# Patient Record
Sex: MALE | Race: WHITE | NOT HISPANIC OR LATINO | Employment: OTHER | URBAN - METROPOLITAN AREA
[De-identification: names, ages, dates, MRNs, and addresses within clinical notes are randomized per-mention and may not be internally consistent; named-entity substitution may affect disease eponyms.]

---

## 2017-05-05 ENCOUNTER — HISTORICAL (OUTPATIENT)
Dept: ADMINISTRATIVE | Facility: HOSPITAL | Age: 62
End: 2017-05-05

## 2017-05-05 LAB
ABS NEUT (OLG): 1.35 X10(3)/MCL (ref 2.1–9.2)
ALBUMIN SERPL-MCNC: 3.7 GM/DL (ref 3.4–5)
ALBUMIN/GLOB SERPL: 1.4 {RATIO}
ALP SERPL-CCNC: 53 UNIT/L (ref 50–136)
ALT SERPL-CCNC: 31 UNIT/L (ref 12–78)
ANISOCYTOSIS BLD QL SMEAR: 1
APPEARANCE, UA: CLEAR
AST SERPL-CCNC: 14 UNIT/L (ref 15–37)
BACTERIA SPEC CULT: NORMAL /HPF
BILIRUB SERPL-MCNC: 0.9 MG/DL (ref 0.2–1)
BILIRUB UR QL STRIP: NEGATIVE
BILIRUBIN DIRECT+TOT PNL SERPL-MCNC: 0.2 MG/DL (ref 0–0.2)
BILIRUBIN DIRECT+TOT PNL SERPL-MCNC: 0.7 MG/DL (ref 0–0.8)
BUN SERPL-MCNC: 22 MG/DL (ref 7–18)
CALCIUM SERPL-MCNC: 8.4 MG/DL (ref 8.5–10.1)
CHLORIDE SERPL-SCNC: 108 MMOL/L (ref 98–107)
CHOLEST SERPL-MCNC: 132 MG/DL (ref 0–200)
CHOLEST/HDLC SERPL: 3.2 {RATIO} (ref 0–5)
CO2 SERPL-SCNC: 30 MMOL/L (ref 21–32)
COLOR UR: YELLOW
CREAT SERPL-MCNC: 0.81 MG/DL (ref 0.7–1.3)
EOSINOPHIL NFR BLD MANUAL: 2 % (ref 0–8)
ERYTHROCYTE [DISTWIDTH] IN BLOOD BY AUTOMATED COUNT: 13.2 % (ref 11.5–17)
GLOBULIN SER-MCNC: 2.6 GM/DL (ref 2.4–3.5)
GLUCOSE (UA): NEGATIVE
GLUCOSE SERPL-MCNC: 93 MG/DL (ref 74–106)
HCT VFR BLD AUTO: 39.4 % (ref 42–52)
HDLC SERPL-MCNC: 41 MG/DL (ref 35–60)
HGB BLD-MCNC: 13.7 GM/DL (ref 14–18)
HGB UR QL STRIP: NEGATIVE
KETONES UR QL STRIP: NEGATIVE
LDLC SERPL CALC-MCNC: 74 MG/DL (ref 0–129)
LEUKOCYTE ESTERASE UR QL STRIP: NEGATIVE
LYMPHOCYTES NFR BLD MANUAL: 12 %
LYMPHOCYTES NFR BLD MANUAL: 36 % (ref 13–40)
MCH RBC QN AUTO: 30.4 PG (ref 27–31)
MCHC RBC AUTO-ENTMCNC: 34.8 GM/DL (ref 33–36)
MCV RBC AUTO: 87.4 FL (ref 80–94)
MICROCYTES BLD QL SMEAR: 1
MONOCYTES NFR BLD MANUAL: 14 % (ref 2–11)
NEUTROPHILS NFR BLD MANUAL: 36 % (ref 47–80)
NITRITE UR QL STRIP: NEGATIVE
PH UR STRIP: 5.5 [PH] (ref 5–9)
PLATELET # BLD AUTO: 74 X10(3)/MCL (ref 130–400)
PLATELET # BLD EST: ABNORMAL 10*3/UL
PMV BLD AUTO: 10.3 FL (ref 7.4–10.4)
POTASSIUM SERPL-SCNC: 4.8 MMOL/L (ref 3.5–5.1)
PROT SERPL-MCNC: 6.3 GM/DL (ref 6.4–8.2)
PROT UR QL STRIP: NEGATIVE
PSA SERPL-MCNC: 0.78 NG/ML (ref 0–4)
RBC # BLD AUTO: 4.51 X10(6)/MCL (ref 4.7–6.1)
RBC #/AREA URNS HPF: NORMAL /[HPF]
SODIUM SERPL-SCNC: 143 MMOL/L (ref 136–145)
SP GR UR STRIP: 1.02 (ref 1–1.03)
SQUAMOUS EPITHELIAL, UA: NORMAL
TRIGL SERPL-MCNC: 86 MG/DL (ref 30–150)
TSH SERPL-ACNC: 2.65 MIU/ML (ref 0.36–3.74)
URATE SERPL-MCNC: 6.7 MG/DL (ref 2.6–7.2)
UROBILINOGEN UR STRIP-ACNC: 0.2
VLDLC SERPL CALC-MCNC: 17 MG/DL
WBC # SPEC AUTO: 3.2 X10(3)/MCL (ref 4.5–11.5)
WBC #/AREA URNS HPF: NORMAL /HPF

## 2017-06-12 ENCOUNTER — HISTORICAL (OUTPATIENT)
Dept: RADIOLOGY | Facility: HOSPITAL | Age: 62
End: 2017-06-12

## 2017-06-14 ENCOUNTER — HISTORICAL (OUTPATIENT)
Dept: HEMATOLOGY/ONCOLOGY | Facility: CLINIC | Age: 62
End: 2017-06-14

## 2017-06-14 LAB
ABS NEUT (OLG): 2.33 X10(3)/MCL (ref 2.1–9.2)
BASOPHILS # BLD AUTO: 0 X10(3)/MCL (ref 0–0.2)
BASOPHILS NFR BLD AUTO: 0.4 %
EOSINOPHIL # BLD AUTO: 0.3 X10(3)/MCL (ref 0–0.9)
EOSINOPHIL NFR BLD AUTO: 5.1 %
ERYTHROCYTE [DISTWIDTH] IN BLOOD BY AUTOMATED COUNT: 13.1 % (ref 11.5–17)
HCT VFR BLD AUTO: 43.3 % (ref 42–52)
HGB BLD-MCNC: 15.7 GM/DL (ref 14–18)
LYMPHOCYTES # BLD AUTO: 2 X10(3)/MCL (ref 0.6–4.6)
LYMPHOCYTES NFR BLD AUTO: 37.4 %
MCH RBC QN AUTO: 31.3 PG (ref 27–31)
MCHC RBC AUTO-ENTMCNC: 36.3 GM/DL (ref 33–36)
MCV RBC AUTO: 86.3 FL (ref 80–94)
MONOCYTES # BLD AUTO: 0.8 X10(3)/MCL (ref 0.1–1.3)
MONOCYTES NFR BLD AUTO: 14.5 %
NEUTROPHILS # BLD AUTO: 2.3 X10(3)/MCL (ref 2.1–9.2)
NEUTROPHILS NFR BLD AUTO: 42.6 %
PLATELET # BLD AUTO: 93 X10(3)/MCL (ref 130–400)
PMV BLD AUTO: 9.7 FL (ref 9.4–12.4)
RBC # BLD AUTO: 5.02 X10(6)/MCL (ref 4.7–6.1)
WBC # SPEC AUTO: 5.5 X10(3)/MCL (ref 4.5–11.5)

## 2017-12-18 ENCOUNTER — HISTORICAL (OUTPATIENT)
Dept: ADMINISTRATIVE | Facility: HOSPITAL | Age: 62
End: 2017-12-18

## 2017-12-18 LAB
ABS NEUT (OLG): 2.09 X10(3)/MCL (ref 2.1–9.2)
ALBUMIN SERPL-MCNC: 3.7 GM/DL (ref 3.4–5)
ALBUMIN/GLOB SERPL: 1.2 RATIO (ref 1.1–2)
ALP SERPL-CCNC: 59 UNIT/L (ref 50–136)
ALT SERPL-CCNC: 33 UNIT/L (ref 12–78)
AST SERPL-CCNC: 15 UNIT/L (ref 15–37)
BASOPHILS # BLD AUTO: 0 X10(3)/MCL (ref 0–0.2)
BASOPHILS NFR BLD AUTO: 0.2 %
BILIRUB SERPL-MCNC: 0.8 MG/DL (ref 0.2–1)
BILIRUBIN DIRECT+TOT PNL SERPL-MCNC: 0.2 MG/DL (ref 0–0.5)
BILIRUBIN DIRECT+TOT PNL SERPL-MCNC: 0.6 MG/DL (ref 0–0.8)
BUN SERPL-MCNC: 24 MG/DL (ref 7–18)
CALCIUM SERPL-MCNC: 8.7 MG/DL (ref 8.5–10.1)
CHLORIDE SERPL-SCNC: 106 MMOL/L (ref 98–107)
CO2 SERPL-SCNC: 29 MMOL/L (ref 21–32)
CREAT SERPL-MCNC: 0.76 MG/DL (ref 0.7–1.3)
EOSINOPHIL # BLD AUTO: 0.4 X10(3)/MCL (ref 0–0.9)
EOSINOPHIL NFR BLD AUTO: 6.7 %
ERYTHROCYTE [DISTWIDTH] IN BLOOD BY AUTOMATED COUNT: 13.7 % (ref 11.5–17)
GLOBULIN SER-MCNC: 3 GM/DL (ref 2.4–3.5)
GLUCOSE SERPL-MCNC: 94 MG/DL (ref 74–106)
HCT VFR BLD AUTO: 41.3 % (ref 42–52)
HGB BLD-MCNC: 14.5 GM/DL (ref 14–18)
LYMPHOCYTES # BLD AUTO: 2.1 X10(3)/MCL (ref 0.6–4.6)
LYMPHOCYTES NFR BLD AUTO: 38.5 %
MCH RBC QN AUTO: 30.5 PG (ref 27–31)
MCHC RBC AUTO-ENTMCNC: 35.1 GM/DL (ref 33–36)
MCV RBC AUTO: 86.9 FL (ref 80–94)
MONOCYTES # BLD AUTO: 0.8 X10(3)/MCL (ref 0.1–1.3)
MONOCYTES NFR BLD AUTO: 15.6 %
NEUTROPHILS # BLD AUTO: 2.1 X10(3)/MCL (ref 2.1–9.2)
NEUTROPHILS NFR BLD AUTO: 39 %
PLATELET # BLD AUTO: 83 X10(3)/MCL (ref 130–400)
PMV BLD AUTO: 9.7 FL (ref 9.4–12.4)
POTASSIUM SERPL-SCNC: 4.2 MMOL/L (ref 3.5–5.1)
PROT SERPL-MCNC: 6.7 GM/DL (ref 6.4–8.2)
RBC # BLD AUTO: 4.75 X10(6)/MCL (ref 4.7–6.1)
SODIUM SERPL-SCNC: 141 MMOL/L (ref 136–145)
WBC # SPEC AUTO: 5.4 X10(3)/MCL (ref 4.5–11.5)

## 2018-05-09 ENCOUNTER — HISTORICAL (OUTPATIENT)
Dept: ADMINISTRATIVE | Facility: HOSPITAL | Age: 63
End: 2018-05-09

## 2018-05-09 ENCOUNTER — HISTORICAL (OUTPATIENT)
Dept: HEMATOLOGY/ONCOLOGY | Facility: CLINIC | Age: 63
End: 2018-05-09

## 2018-05-09 LAB
ABS NEUT (OLG): 2.09 X10(3)/MCL (ref 2.1–9.2)
ABS NEUT (OLG): 3.27 X10(3)/MCL (ref 2.1–9.2)
ALBUMIN SERPL-MCNC: 4.1 GM/DL (ref 3.4–5)
ALBUMIN/GLOB SERPL: 1.5 {RATIO}
ALP SERPL-CCNC: 57 UNIT/L (ref 50–136)
ALT SERPL-CCNC: 34 UNIT/L (ref 12–78)
ANISOCYTOSIS BLD QL SMEAR: 2
AST SERPL-CCNC: 13 UNIT/L (ref 15–37)
BASOPHILS # BLD AUTO: 0 X10(3)/MCL (ref 0–0.2)
BASOPHILS # BLD AUTO: 0 X10(3)/MCL (ref 0–0.2)
BASOPHILS NFR BLD AUTO: 0.4 %
BASOPHILS NFR BLD AUTO: 1 %
BILIRUB SERPL-MCNC: 1.1 MG/DL (ref 0.2–1)
BILIRUBIN DIRECT+TOT PNL SERPL-MCNC: 0.2 MG/DL (ref 0–0.2)
BILIRUBIN DIRECT+TOT PNL SERPL-MCNC: 0.9 MG/DL (ref 0–0.8)
BUN SERPL-MCNC: 23 MG/DL (ref 7–18)
CALCIUM SERPL-MCNC: 8.8 MG/DL (ref 8.5–10.1)
CHLORIDE SERPL-SCNC: 108 MMOL/L (ref 98–107)
CHOLEST SERPL-MCNC: 140 MG/DL (ref 0–200)
CHOLEST/HDLC SERPL: 3.7 {RATIO} (ref 0–5)
CO2 SERPL-SCNC: 28 MMOL/L (ref 21–32)
CREAT SERPL-MCNC: 0.92 MG/DL (ref 0.7–1.3)
EOSINOPHIL # BLD AUTO: 0.2 X10(3)/MCL (ref 0–0.9)
EOSINOPHIL # BLD AUTO: 0.2 X10(3)/MCL (ref 0–0.9)
EOSINOPHIL NFR BLD AUTO: 2.9 %
EOSINOPHIL NFR BLD AUTO: 4 %
ERYTHROCYTE [DISTWIDTH] IN BLOOD BY AUTOMATED COUNT: 13.3 % (ref 11.5–17)
ERYTHROCYTE [DISTWIDTH] IN BLOOD BY AUTOMATED COUNT: 14.2 % (ref 11.5–17)
GLOBULIN SER-MCNC: 2.8 GM/DL (ref 2.4–3.5)
GLUCOSE SERPL-MCNC: 105 MG/DL (ref 74–106)
GROUP & RH: NORMAL
HCT VFR BLD AUTO: 42.3 % (ref 42–52)
HCT VFR BLD AUTO: 44.3 % (ref 42–52)
HDLC SERPL-MCNC: 38 MG/DL (ref 35–60)
HGB BLD-MCNC: 15.3 GM/DL (ref 14–18)
HGB BLD-MCNC: 15.5 GM/DL (ref 14–18)
LDLC SERPL CALC-MCNC: 75 MG/DL (ref 0–129)
LYMPHOCYTES # BLD AUTO: 2.8 X10(3)/MCL (ref 0.6–4.6)
LYMPHOCYTES # BLD AUTO: 3.2 X10(3)/MCL (ref 0.6–4.6)
LYMPHOCYTES NFR BLD AUTO: 42 %
LYMPHOCYTES NFR BLD AUTO: 48 %
MCH RBC QN AUTO: 30 PG (ref 27–31)
MCH RBC QN AUTO: 30.7 PG (ref 27–31)
MCHC RBC AUTO-ENTMCNC: 35 GM/DL (ref 33–36)
MCHC RBC AUTO-ENTMCNC: 36.2 GM/DL (ref 33–36)
MCV RBC AUTO: 84.8 FL (ref 80–94)
MCV RBC AUTO: 85.9 FL (ref 80–94)
MICROCYTES BLD QL SMEAR: 2
MONOCYTES # BLD AUTO: 0.6 X10(3)/MCL (ref 0.1–1.3)
MONOCYTES # BLD AUTO: 0.9 X10(3)/MCL (ref 0.1–1.3)
MONOCYTES NFR BLD AUTO: 11 %
MONOCYTES NFR BLD AUTO: 11.9 %
NEUTROPHILS # BLD AUTO: 2.09 X10(3)/MCL (ref 1.4–7.9)
NEUTROPHILS # BLD AUTO: 3.3 X10(3)/MCL (ref 2.1–9.2)
NEUTROPHILS NFR BLD AUTO: 36 %
NEUTROPHILS NFR BLD AUTO: 42.8 %
PLATELET # BLD AUTO: 6 X10(3)/MCL (ref 130–400)
PLATELET # BLD AUTO: 7 X10(3)/MCL (ref 130–400)
PLATELET # BLD EST: NORMAL 10*3/UL
PMV BLD AUTO: 10.4 FL (ref 9.4–12.4)
PMV BLD AUTO: 10.7 FL (ref 9.4–12.4)
POTASSIUM SERPL-SCNC: 4.4 MMOL/L (ref 3.5–5.1)
PROT SERPL-MCNC: 6.9 GM/DL (ref 6.4–8.2)
RBC # BLD AUTO: 4.99 X10(6)/MCL (ref 4.7–6.1)
RBC # BLD AUTO: 5.16 X10(6)/MCL (ref 4.7–6.1)
SODIUM SERPL-SCNC: 143 MMOL/L (ref 136–145)
TRIGL SERPL-MCNC: 135 MG/DL (ref 30–150)
VLDLC SERPL CALC-MCNC: 27 MG/DL
WBC # SPEC AUTO: 5.8 X10(3)/MCL (ref 4.5–11.5)
WBC # SPEC AUTO: 7.6 X10(3)/MCL (ref 4.5–11.5)

## 2018-05-11 ENCOUNTER — HISTORICAL (OUTPATIENT)
Dept: HEMATOLOGY/ONCOLOGY | Facility: CLINIC | Age: 63
End: 2018-05-11

## 2018-05-11 LAB
ABS NEUT (OLG): 7.15 X10(3)/MCL (ref 2.1–9.2)
BASOPHILS # BLD AUTO: 0 X10(3)/MCL (ref 0–0.2)
BASOPHILS NFR BLD AUTO: 0.2 %
EOSINOPHIL # BLD AUTO: 0 X10(3)/MCL (ref 0–0.9)
EOSINOPHIL NFR BLD AUTO: 0.1 %
ERYTHROCYTE [DISTWIDTH] IN BLOOD BY AUTOMATED COUNT: 14 % (ref 11.5–17)
HCT VFR BLD AUTO: 42.9 % (ref 42–52)
HGB BLD-MCNC: 15.4 GM/DL (ref 14–18)
LYMPHOCYTES # BLD AUTO: 4.3 X10(3)/MCL (ref 0.6–4.6)
LYMPHOCYTES NFR BLD AUTO: 36.7 %
MCH RBC QN AUTO: 30.1 PG (ref 27–31)
MCHC RBC AUTO-ENTMCNC: 35.9 GM/DL (ref 33–36)
MCV RBC AUTO: 83.8 FL (ref 80–94)
MONOCYTES # BLD AUTO: 0.2 X10(3)/MCL (ref 0.1–1.3)
MONOCYTES NFR BLD AUTO: 2 %
NEUTROPHILS # BLD AUTO: 7.2 X10(3)/MCL (ref 2.1–9.2)
NEUTROPHILS NFR BLD AUTO: 61 %
PLATELET # BLD AUTO: 14 X10(3)/MCL (ref 130–400)
PMV BLD AUTO: 10.4 FL (ref 9.4–12.4)
RBC # BLD AUTO: 5.12 X10(6)/MCL (ref 4.7–6.1)
WBC # SPEC AUTO: 11.7 X10(3)/MCL (ref 4.5–11.5)

## 2018-05-14 ENCOUNTER — HISTORICAL (OUTPATIENT)
Dept: HEMATOLOGY/ONCOLOGY | Facility: CLINIC | Age: 63
End: 2018-05-14

## 2018-05-14 LAB
ABS NEUT (OLG): 4.46 X10(3)/MCL (ref 2.1–9.2)
BASOPHILS # BLD AUTO: 0 X10(3)/MCL (ref 0–0.2)
BASOPHILS NFR BLD AUTO: 0.1 %
EOSINOPHIL # BLD AUTO: 0.1 X10(3)/MCL (ref 0–0.9)
EOSINOPHIL NFR BLD AUTO: 0.9 %
ERYTHROCYTE [DISTWIDTH] IN BLOOD BY AUTOMATED COUNT: 14.4 % (ref 11.5–17)
HCT VFR BLD AUTO: 41.5 % (ref 42–52)
HGB BLD-MCNC: 14.6 GM/DL (ref 14–18)
LYMPHOCYTES # BLD AUTO: 2.5 X10(3)/MCL (ref 0.6–4.6)
LYMPHOCYTES NFR BLD AUTO: 32.6 %
MCH RBC QN AUTO: 30.2 PG (ref 27–31)
MCHC RBC AUTO-ENTMCNC: 35.2 GM/DL (ref 33–36)
MCV RBC AUTO: 85.7 FL (ref 80–94)
MONOCYTES # BLD AUTO: 0.6 X10(3)/MCL (ref 0.1–1.3)
MONOCYTES NFR BLD AUTO: 7.6 %
NEUTROPHILS # BLD AUTO: 4.5 X10(3)/MCL (ref 2.1–9.2)
NEUTROPHILS NFR BLD AUTO: 58.8 %
PLATELET # BLD AUTO: 7 X10(3)/MCL (ref 130–400)
PMV BLD AUTO: 9 FL (ref 9.4–12.4)
RBC # BLD AUTO: 4.84 X10(6)/MCL (ref 4.7–6.1)
WBC # SPEC AUTO: 7.6 X10(3)/MCL (ref 4.5–11.5)

## 2018-05-16 ENCOUNTER — HISTORICAL (OUTPATIENT)
Dept: INFUSION THERAPY | Facility: HOSPITAL | Age: 63
End: 2018-05-16

## 2018-05-16 LAB
ABS NEUT (OLG): 4.61 X10(3)/MCL (ref 2.1–9.2)
BASOPHILS # BLD AUTO: 0 X10(3)/MCL (ref 0–0.2)
BASOPHILS NFR BLD AUTO: 0.1 %
EOSINOPHIL # BLD AUTO: 0 X10(3)/MCL (ref 0–0.9)
EOSINOPHIL NFR BLD AUTO: 0.5 %
ERYTHROCYTE [DISTWIDTH] IN BLOOD BY AUTOMATED COUNT: 14.4 % (ref 11.5–17)
HCT VFR BLD AUTO: 40.7 % (ref 42–52)
HGB BLD-MCNC: 14.5 GM/DL (ref 14–18)
LYMPHOCYTES # BLD AUTO: 2.4 X10(3)/MCL (ref 0.6–4.6)
LYMPHOCYTES NFR BLD AUTO: 31.4 %
MCH RBC QN AUTO: 30.1 PG (ref 27–31)
MCHC RBC AUTO-ENTMCNC: 35.6 GM/DL (ref 33–36)
MCV RBC AUTO: 84.6 FL (ref 80–94)
MONOCYTES # BLD AUTO: 0.6 X10(3)/MCL (ref 0.1–1.3)
MONOCYTES NFR BLD AUTO: 7.8 %
NEUTROPHILS # BLD AUTO: 4.6 X10(3)/MCL (ref 2.1–9.2)
NEUTROPHILS NFR BLD AUTO: 60.2 %
PLATELET # BLD AUTO: 4 X10(3)/MCL (ref 130–400)
PMV BLD AUTO: 9.8 FL (ref 9.4–12.4)
RBC # BLD AUTO: 4.81 X10(6)/MCL (ref 4.7–6.1)
WBC # SPEC AUTO: 7.7 X10(3)/MCL (ref 4.5–11.5)

## 2018-05-18 ENCOUNTER — HISTORICAL (OUTPATIENT)
Dept: HEMATOLOGY/ONCOLOGY | Facility: CLINIC | Age: 63
End: 2018-05-18

## 2018-05-18 LAB
ABS NEUT (OLG): 6.33 X10(3)/MCL (ref 2.1–9.2)
BASOPHILS # BLD AUTO: 0 X10(3)/MCL (ref 0–0.2)
BASOPHILS NFR BLD AUTO: 0.2 %
EOSINOPHIL # BLD AUTO: 0.3 X10(3)/MCL (ref 0–0.9)
EOSINOPHIL NFR BLD AUTO: 2.5 %
ERYTHROCYTE [DISTWIDTH] IN BLOOD BY AUTOMATED COUNT: 14.9 % (ref 11.5–17)
HCT VFR BLD AUTO: 39.7 % (ref 42–52)
HGB BLD-MCNC: 14 GM/DL (ref 14–18)
LYMPHOCYTES # BLD AUTO: 4.5 X10(3)/MCL (ref 0.6–4.6)
LYMPHOCYTES NFR BLD AUTO: 35.7 %
MCH RBC QN AUTO: 30.5 PG (ref 27–31)
MCHC RBC AUTO-ENTMCNC: 35.3 GM/DL (ref 33–36)
MCV RBC AUTO: 86.5 FL (ref 80–94)
MONOCYTES # BLD AUTO: 1.5 X10(3)/MCL (ref 0.1–1.3)
MONOCYTES NFR BLD AUTO: 11.6 %
NEUTROPHILS # BLD AUTO: 6.3 X10(3)/MCL (ref 2.1–9.2)
NEUTROPHILS NFR BLD AUTO: 50 %
PLATELET # BLD AUTO: 22 X10(3)/MCL (ref 130–400)
PMV BLD AUTO: 10.9 FL (ref 9.4–12.4)
RBC # BLD AUTO: 4.59 X10(6)/MCL (ref 4.7–6.1)
WBC # SPEC AUTO: 12.7 X10(3)/MCL (ref 4.5–11.5)

## 2018-05-21 ENCOUNTER — HISTORICAL (OUTPATIENT)
Dept: ADMINISTRATIVE | Facility: HOSPITAL | Age: 63
End: 2018-05-21

## 2018-05-21 LAB
ABS NEUT (OLG): 9.77 X10(3)/MCL (ref 2.1–9.2)
ALBUMIN SERPL-MCNC: 4.3 GM/DL (ref 3.4–5)
ALBUMIN/GLOB SERPL: 1.5 {RATIO}
ALP SERPL-CCNC: 54 UNIT/L (ref 50–136)
ALT SERPL-CCNC: 62 UNIT/L (ref 12–78)
AST SERPL-CCNC: 19 UNIT/L (ref 15–37)
BASOPHILS # BLD AUTO: 0 X10(3)/MCL (ref 0–0.2)
BASOPHILS NFR BLD AUTO: 0.1 %
BILIRUB SERPL-MCNC: 2.5 MG/DL (ref 0.2–1)
BILIRUBIN DIRECT+TOT PNL SERPL-MCNC: 0.5 MG/DL (ref 0–0.2)
BILIRUBIN DIRECT+TOT PNL SERPL-MCNC: 2 MG/DL (ref 0–0.8)
BUN SERPL-MCNC: 35 MG/DL (ref 7–18)
CALCIUM SERPL-MCNC: 8.9 MG/DL (ref 8.5–10.1)
CHLORIDE SERPL-SCNC: 100 MMOL/L (ref 98–107)
CO2 SERPL-SCNC: 27 MMOL/L (ref 21–32)
CREAT SERPL-MCNC: 1.15 MG/DL (ref 0.7–1.3)
EOSINOPHIL # BLD AUTO: 0.1 X10(3)/MCL (ref 0–0.9)
EOSINOPHIL NFR BLD AUTO: 0.5 %
ERYTHROCYTE [DISTWIDTH] IN BLOOD BY AUTOMATED COUNT: 15 % (ref 11.5–17)
GLOBULIN SER-MCNC: 2.8 GM/DL (ref 2.4–3.5)
GLUCOSE SERPL-MCNC: 124 MG/DL (ref 74–106)
HCT VFR BLD AUTO: 42.6 % (ref 42–52)
HGB BLD-MCNC: 15.2 GM/DL (ref 14–18)
LYMPHOCYTES # BLD AUTO: 3.9 X10(3)/MCL (ref 0.6–4.6)
LYMPHOCYTES NFR BLD AUTO: 27.6 %
MCH RBC QN AUTO: 30.6 PG (ref 27–31)
MCHC RBC AUTO-ENTMCNC: 35.7 GM/DL (ref 33–36)
MCV RBC AUTO: 85.9 FL (ref 80–94)
MONOCYTES # BLD AUTO: 0.4 X10(3)/MCL (ref 0.1–1.3)
MONOCYTES NFR BLD AUTO: 2.8 %
NEUTROPHILS # BLD AUTO: 9.8 X10(3)/MCL (ref 2.1–9.2)
NEUTROPHILS NFR BLD AUTO: 69 %
PLATELET # BLD AUTO: 25 X10(3)/MCL (ref 130–400)
PMV BLD AUTO: 9.4 FL (ref 9.4–12.4)
POTASSIUM SERPL-SCNC: 4.5 MMOL/L (ref 3.5–5.1)
PROT SERPL-MCNC: 7.1 GM/DL (ref 6.4–8.2)
RBC # BLD AUTO: 4.96 X10(6)/MCL (ref 4.7–6.1)
SODIUM SERPL-SCNC: 137 MMOL/L (ref 136–145)
WBC # SPEC AUTO: 14.1 X10(3)/MCL (ref 4.5–11.5)

## 2018-05-24 ENCOUNTER — HISTORICAL (OUTPATIENT)
Dept: HEMATOLOGY/ONCOLOGY | Facility: CLINIC | Age: 63
End: 2018-05-24

## 2018-05-24 LAB
ABS NEUT (OLG): 8.37 X10(3)/MCL (ref 2.1–9.2)
BASOPHILS # BLD AUTO: 0 X10(3)/MCL (ref 0–0.2)
BASOPHILS NFR BLD AUTO: 0.2 %
EOSINOPHIL # BLD AUTO: 0.1 X10(3)/MCL (ref 0–0.9)
EOSINOPHIL NFR BLD AUTO: 0.9 %
ERYTHROCYTE [DISTWIDTH] IN BLOOD BY AUTOMATED COUNT: 15.1 % (ref 11.5–17)
HAV IGM SERPL QL IA: NEGATIVE
HBV CORE IGM SERPL QL IA: NEGATIVE
HBV SURFACE AG SERPL QL IA: NEGATIVE
HCT VFR BLD AUTO: 40.4 % (ref 42–52)
HCV AB SERPL QL IA: NEGATIVE
HEPATITIS PANEL INTERP: NORMAL
HGB BLD-MCNC: 14.1 GM/DL (ref 14–18)
HIV 1+2 AB+HIV1 P24 AG SERPL QL IA: NEGATIVE
LYMPHOCYTES # BLD AUTO: 2.4 X10(3)/MCL (ref 0.6–4.6)
LYMPHOCYTES NFR BLD AUTO: 20.7 %
MCH RBC QN AUTO: 30.7 PG (ref 27–31)
MCHC RBC AUTO-ENTMCNC: 34.9 GM/DL (ref 33–36)
MCV RBC AUTO: 87.8 FL (ref 80–94)
MONOCYTES # BLD AUTO: 0.9 X10(3)/MCL (ref 0.1–1.3)
MONOCYTES NFR BLD AUTO: 7.4 %
NEUTROPHILS # BLD AUTO: 8.4 X10(3)/MCL (ref 2.1–9.2)
NEUTROPHILS NFR BLD AUTO: 70.8 %
PLATELET # BLD AUTO: 7 X10(3)/MCL (ref 130–400)
PMV BLD AUTO: 8.9 FL (ref 9.4–12.4)
RBC # BLD AUTO: 4.6 X10(6)/MCL (ref 4.7–6.1)
WBC # SPEC AUTO: 11.8 X10(3)/MCL (ref 4.5–11.5)

## 2018-05-29 ENCOUNTER — HISTORICAL (OUTPATIENT)
Dept: HEMATOLOGY/ONCOLOGY | Facility: CLINIC | Age: 63
End: 2018-05-29

## 2018-05-29 LAB
ABS NEUT (OLG): 9.07 X10(3)/MCL (ref 2.1–9.2)
BASOPHILS # BLD AUTO: 0 X10(3)/MCL (ref 0–0.2)
BASOPHILS NFR BLD AUTO: 0.1 %
EOSINOPHIL # BLD AUTO: 0.1 X10(3)/MCL (ref 0–0.9)
EOSINOPHIL NFR BLD AUTO: 0.7 %
ERYTHROCYTE [DISTWIDTH] IN BLOOD BY AUTOMATED COUNT: 15.6 % (ref 11.5–17)
HCT VFR BLD AUTO: 40.9 % (ref 42–52)
HGB BLD-MCNC: 14.3 GM/DL (ref 14–18)
LYMPHOCYTES # BLD AUTO: 3.6 X10(3)/MCL (ref 0.6–4.6)
LYMPHOCYTES NFR BLD AUTO: 26.2 %
MCH RBC QN AUTO: 31 PG (ref 27–31)
MCHC RBC AUTO-ENTMCNC: 35 GM/DL (ref 33–36)
MCV RBC AUTO: 88.7 FL (ref 80–94)
MONOCYTES # BLD AUTO: 0.9 X10(3)/MCL (ref 0.1–1.3)
MONOCYTES NFR BLD AUTO: 6.8 %
NEUTROPHILS # BLD AUTO: 9.1 X10(3)/MCL (ref 2.1–9.2)
NEUTROPHILS NFR BLD AUTO: 66.2 %
PLATELET # BLD AUTO: 4 X10(3)/MCL (ref 130–400)
PMV BLD AUTO: 10.7 FL (ref 9.4–12.4)
RBC # BLD AUTO: 4.61 X10(6)/MCL (ref 4.7–6.1)
WBC # SPEC AUTO: 13.7 X10(3)/MCL (ref 4.5–11.5)

## 2018-05-31 ENCOUNTER — HISTORICAL (OUTPATIENT)
Dept: HEMATOLOGY/ONCOLOGY | Facility: CLINIC | Age: 63
End: 2018-05-31

## 2018-05-31 LAB
ABS NEUT (OLG): 6.28 X10(3)/MCL (ref 2.1–9.2)
ALBUMIN SERPL-MCNC: 3.7 GM/DL (ref 3.4–5)
ALBUMIN/GLOB SERPL: 1.4 {RATIO}
ALP SERPL-CCNC: 47 UNIT/L (ref 50–136)
ALT SERPL-CCNC: 68 UNIT/L (ref 12–78)
AST SERPL-CCNC: 21 UNIT/L (ref 15–37)
BASOPHILS # BLD AUTO: 0 X10(3)/MCL (ref 0–0.2)
BASOPHILS NFR BLD AUTO: 0.1 %
BILIRUB SERPL-MCNC: 1 MG/DL (ref 0.2–1)
BILIRUBIN DIRECT+TOT PNL SERPL-MCNC: 0.3 MG/DL (ref 0–0.2)
BILIRUBIN DIRECT+TOT PNL SERPL-MCNC: 0.7 MG/DL (ref 0–0.8)
BUN SERPL-MCNC: 22 MG/DL (ref 7–18)
CALCIUM SERPL-MCNC: 8.3 MG/DL (ref 8.5–10.1)
CHLORIDE SERPL-SCNC: 104 MMOL/L (ref 98–107)
CO2 SERPL-SCNC: 29 MMOL/L (ref 21–32)
CREAT SERPL-MCNC: 0.84 MG/DL (ref 0.7–1.3)
EOSINOPHIL # BLD AUTO: 0.1 X10(3)/MCL (ref 0–0.9)
EOSINOPHIL NFR BLD AUTO: 1.6 %
ERYTHROCYTE [DISTWIDTH] IN BLOOD BY AUTOMATED COUNT: 15.6 % (ref 11.5–17)
GLOBULIN SER-MCNC: 2.6 GM/DL (ref 2.4–3.5)
GLUCOSE SERPL-MCNC: 97 MG/DL (ref 74–106)
HCT VFR BLD AUTO: 41.4 % (ref 42–52)
HGB BLD-MCNC: 14.5 GM/DL (ref 14–18)
LDH SERPL-CCNC: 154 UNIT/L (ref 87–241)
LYMPHOCYTES # BLD AUTO: 1.4 X10(3)/MCL (ref 0.6–4.6)
LYMPHOCYTES NFR BLD AUTO: 16.6 %
MCH RBC QN AUTO: 31 PG (ref 27–31)
MCHC RBC AUTO-ENTMCNC: 35 GM/DL (ref 33–36)
MCV RBC AUTO: 88.5 FL (ref 80–94)
MONOCYTES # BLD AUTO: 0.8 X10(3)/MCL (ref 0.1–1.3)
MONOCYTES NFR BLD AUTO: 8.9 %
NEUTROPHILS # BLD AUTO: 6.3 X10(3)/MCL (ref 2.1–9.2)
NEUTROPHILS NFR BLD AUTO: 72.8 %
PLATELET # BLD AUTO: 10 X10(3)/MCL (ref 130–400)
PMV BLD AUTO: 11.4 FL (ref 9.4–12.4)
POTASSIUM SERPL-SCNC: 4.1 MMOL/L (ref 3.5–5.1)
PROT SERPL-MCNC: 6.3 GM/DL (ref 6.4–8.2)
RBC # BLD AUTO: 4.68 X10(6)/MCL (ref 4.7–6.1)
SODIUM SERPL-SCNC: 142 MMOL/L (ref 136–145)
WBC # SPEC AUTO: 8.6 X10(3)/MCL (ref 4.5–11.5)

## 2018-06-04 ENCOUNTER — HISTORICAL (OUTPATIENT)
Dept: ADMINISTRATIVE | Facility: HOSPITAL | Age: 63
End: 2018-06-04

## 2018-06-04 LAB
ABS NEUT (OLG): 5.37 X10(3)/MCL (ref 2.1–9.2)
ALBUMIN SERPL-MCNC: 3.9 GM/DL (ref 3.4–5)
ALBUMIN/GLOB SERPL: 1.6 {RATIO}
ALP SERPL-CCNC: 45 UNIT/L (ref 50–136)
ALT SERPL-CCNC: 49 UNIT/L (ref 12–78)
AST SERPL-CCNC: 13 UNIT/L (ref 15–37)
BASOPHILS # BLD AUTO: 0 X10(3)/MCL (ref 0–0.2)
BASOPHILS NFR BLD AUTO: 0.1 %
BILIRUB SERPL-MCNC: 1.5 MG/DL (ref 0.2–1)
BILIRUBIN DIRECT+TOT PNL SERPL-MCNC: 0.3 MG/DL (ref 0–0.2)
BILIRUBIN DIRECT+TOT PNL SERPL-MCNC: 1.2 MG/DL (ref 0–0.8)
BUN SERPL-MCNC: 22 MG/DL (ref 7–18)
CALCIUM SERPL-MCNC: 8.8 MG/DL (ref 8.5–10.1)
CHLORIDE SERPL-SCNC: 105 MMOL/L (ref 98–107)
CO2 SERPL-SCNC: 29 MMOL/L (ref 21–32)
CREAT SERPL-MCNC: 0.85 MG/DL (ref 0.7–1.3)
EOSINOPHIL # BLD AUTO: 0.1 X10(3)/MCL (ref 0–0.9)
EOSINOPHIL NFR BLD AUTO: 1.3 %
ERYTHROCYTE [DISTWIDTH] IN BLOOD BY AUTOMATED COUNT: 15.2 % (ref 11.5–17)
GLOBULIN SER-MCNC: 2.4 GM/DL (ref 2.4–3.5)
GLUCOSE SERPL-MCNC: 114 MG/DL (ref 74–106)
HCT VFR BLD AUTO: 40 % (ref 42–52)
HGB BLD-MCNC: 14 GM/DL (ref 14–18)
LYMPHOCYTES # BLD AUTO: 1 X10(3)/MCL (ref 0.6–4.6)
LYMPHOCYTES NFR BLD AUTO: 14 %
MCH RBC QN AUTO: 30.8 PG (ref 27–31)
MCHC RBC AUTO-ENTMCNC: 35 GM/DL (ref 33–36)
MCV RBC AUTO: 87.9 FL (ref 80–94)
MONOCYTES # BLD AUTO: 0.4 X10(3)/MCL (ref 0.1–1.3)
MONOCYTES NFR BLD AUTO: 6.5 %
NEUTROPHILS # BLD AUTO: 5.4 X10(3)/MCL (ref 2.1–9.2)
NEUTROPHILS NFR BLD AUTO: 78.1 %
PLATELET # BLD AUTO: 9 X10(3)/MCL (ref 130–400)
PMV BLD AUTO: 11.6 FL (ref 9.4–12.4)
POTASSIUM SERPL-SCNC: 4.3 MMOL/L (ref 3.5–5.1)
PROT SERPL-MCNC: 6.3 GM/DL (ref 6.4–8.2)
RBC # BLD AUTO: 4.55 X10(6)/MCL (ref 4.7–6.1)
SODIUM SERPL-SCNC: 141 MMOL/L (ref 136–145)
WBC # SPEC AUTO: 6.9 X10(3)/MCL (ref 4.5–11.5)

## 2018-06-05 ENCOUNTER — HISTORICAL (OUTPATIENT)
Dept: INFUSION THERAPY | Facility: HOSPITAL | Age: 63
End: 2018-06-05

## 2018-06-08 ENCOUNTER — HISTORICAL (OUTPATIENT)
Dept: HEMATOLOGY/ONCOLOGY | Facility: CLINIC | Age: 63
End: 2018-06-08

## 2018-06-08 LAB
ABS NEUT (OLG): 6.44 X10(3)/MCL (ref 2.1–9.2)
BASOPHILS # BLD AUTO: 0 X10(3)/MCL (ref 0–0.2)
BASOPHILS NFR BLD AUTO: 0.1 %
EOSINOPHIL # BLD AUTO: 0.1 X10(3)/MCL (ref 0–0.9)
EOSINOPHIL NFR BLD AUTO: 1.4 %
ERYTHROCYTE [DISTWIDTH] IN BLOOD BY AUTOMATED COUNT: 15.2 % (ref 11.5–17)
HCT VFR BLD AUTO: 42 % (ref 42–52)
HGB BLD-MCNC: 14.7 GM/DL (ref 14–18)
LYMPHOCYTES # BLD AUTO: 1 X10(3)/MCL (ref 0.6–4.6)
LYMPHOCYTES NFR BLD AUTO: 12.8 %
MCH RBC QN AUTO: 31.1 PG (ref 27–31)
MCHC RBC AUTO-ENTMCNC: 35 GM/DL (ref 33–36)
MCV RBC AUTO: 88.8 FL (ref 80–94)
MONOCYTES # BLD AUTO: 0.4 X10(3)/MCL (ref 0.1–1.3)
MONOCYTES NFR BLD AUTO: 5.6 %
NEUTROPHILS # BLD AUTO: 6.4 X10(3)/MCL (ref 2.1–9.2)
NEUTROPHILS NFR BLD AUTO: 80.1 %
PLATELET # BLD AUTO: 6 X10(3)/MCL (ref 130–400)
PMV BLD AUTO: ABNORMAL FL (ref 9.4–12.4)
RBC # BLD AUTO: 4.73 X10(6)/MCL (ref 4.7–6.1)
WBC # SPEC AUTO: 8 X10(3)/MCL (ref 4.5–11.5)

## 2018-06-12 ENCOUNTER — HISTORICAL (OUTPATIENT)
Dept: INFUSION THERAPY | Facility: HOSPITAL | Age: 63
End: 2018-06-12

## 2018-06-12 LAB
ABS NEUT (OLG): 6.29 X10(3)/MCL (ref 2.1–9.2)
ALBUMIN SERPL-MCNC: 3.9 GM/DL (ref 3.4–5)
ALBUMIN/GLOB SERPL: 1.6 RATIO (ref 1.1–2)
ALP SERPL-CCNC: 49 UNIT/L (ref 50–136)
ALT SERPL-CCNC: 72 UNIT/L (ref 12–78)
AST SERPL-CCNC: 21 UNIT/L (ref 15–37)
BASOPHILS # BLD AUTO: 0 X10(3)/MCL (ref 0–0.2)
BASOPHILS NFR BLD AUTO: 0.1 %
BILIRUB SERPL-MCNC: 1.6 MG/DL (ref 0.2–1)
BILIRUBIN DIRECT+TOT PNL SERPL-MCNC: 0.4 MG/DL (ref 0–0.5)
BILIRUBIN DIRECT+TOT PNL SERPL-MCNC: 1.2 MG/DL (ref 0–0.8)
BUN SERPL-MCNC: 30 MG/DL (ref 7–18)
CALCIUM SERPL-MCNC: 8.9 MG/DL (ref 8.5–10.1)
CHLORIDE SERPL-SCNC: 102 MMOL/L (ref 98–107)
CO2 SERPL-SCNC: 30 MMOL/L (ref 21–32)
CREAT SERPL-MCNC: 0.94 MG/DL (ref 0.7–1.3)
EOSINOPHIL # BLD AUTO: 0.1 X10(3)/MCL (ref 0–0.9)
EOSINOPHIL NFR BLD AUTO: 1.1 %
ERYTHROCYTE [DISTWIDTH] IN BLOOD BY AUTOMATED COUNT: 14.9 % (ref 11.5–17)
GLOBULIN SER-MCNC: 2.5 GM/DL (ref 2.4–3.5)
GLUCOSE SERPL-MCNC: 93 MG/DL (ref 74–106)
HCT VFR BLD AUTO: 41.8 % (ref 42–52)
HGB BLD-MCNC: 14.9 GM/DL (ref 14–18)
LYMPHOCYTES # BLD AUTO: 1.1 X10(3)/MCL (ref 0.6–4.6)
LYMPHOCYTES NFR BLD AUTO: 13.6 %
MCH RBC QN AUTO: 31.4 PG (ref 27–31)
MCHC RBC AUTO-ENTMCNC: 35.6 GM/DL (ref 33–36)
MCV RBC AUTO: 88 FL (ref 80–94)
MONOCYTES # BLD AUTO: 0.7 X10(3)/MCL (ref 0.1–1.3)
MONOCYTES NFR BLD AUTO: 8.3 %
NEUTROPHILS # BLD AUTO: 6.3 X10(3)/MCL (ref 2.1–9.2)
NEUTROPHILS NFR BLD AUTO: 76.9 %
PLATELET # BLD AUTO: 11 X10(3)/MCL (ref 130–400)
PMV BLD AUTO: 10.9 FL (ref 9.4–12.4)
POTASSIUM SERPL-SCNC: 4.6 MMOL/L (ref 3.5–5.1)
PROT SERPL-MCNC: 6.4 GM/DL (ref 6.4–8.2)
RBC # BLD AUTO: 4.75 X10(6)/MCL (ref 4.7–6.1)
SODIUM SERPL-SCNC: 138 MMOL/L (ref 136–145)
WBC # SPEC AUTO: 8.2 X10(3)/MCL (ref 4.5–11.5)

## 2018-06-15 ENCOUNTER — HISTORICAL (OUTPATIENT)
Dept: HEMATOLOGY/ONCOLOGY | Facility: CLINIC | Age: 63
End: 2018-06-15

## 2018-06-15 LAB
ABS NEUT (OLG): 6.82 X10(3)/MCL (ref 2.1–9.2)
ALBUMIN SERPL-MCNC: 3.9 GM/DL (ref 3.4–5)
ALBUMIN/GLOB SERPL: 1.6 {RATIO}
ALP SERPL-CCNC: 44 UNIT/L (ref 50–136)
ALT SERPL-CCNC: 46 UNIT/L (ref 12–78)
AST SERPL-CCNC: 12 UNIT/L (ref 15–37)
BASOPHILS # BLD AUTO: 0 X10(3)/MCL (ref 0–0.2)
BASOPHILS NFR BLD AUTO: 0.1 %
BILIRUB SERPL-MCNC: 1.2 MG/DL (ref 0.2–1)
BILIRUBIN DIRECT+TOT PNL SERPL-MCNC: 0.3 MG/DL (ref 0–0.2)
BILIRUBIN DIRECT+TOT PNL SERPL-MCNC: 0.9 MG/DL (ref 0–0.8)
BUN SERPL-MCNC: 29 MG/DL (ref 7–18)
CALCIUM SERPL-MCNC: 8.8 MG/DL (ref 8.5–10.1)
CHLORIDE SERPL-SCNC: 105 MMOL/L (ref 98–107)
CO2 SERPL-SCNC: 26 MMOL/L (ref 21–32)
CREAT SERPL-MCNC: 0.87 MG/DL (ref 0.7–1.3)
EOSINOPHIL # BLD AUTO: 0.1 X10(3)/MCL (ref 0–0.9)
EOSINOPHIL NFR BLD AUTO: 1 %
ERYTHROCYTE [DISTWIDTH] IN BLOOD BY AUTOMATED COUNT: 14.9 % (ref 11.5–17)
GLOBULIN SER-MCNC: 2.5 GM/DL (ref 2.4–3.5)
GLUCOSE SERPL-MCNC: 111 MG/DL (ref 74–106)
HCT VFR BLD AUTO: 42 % (ref 42–52)
HGB BLD-MCNC: 14.6 GM/DL (ref 14–18)
LYMPHOCYTES # BLD AUTO: 1.5 X10(3)/MCL (ref 0.6–4.6)
LYMPHOCYTES NFR BLD AUTO: 16 %
MCH RBC QN AUTO: 31 PG (ref 27–31)
MCHC RBC AUTO-ENTMCNC: 34.8 GM/DL (ref 33–36)
MCV RBC AUTO: 89.2 FL (ref 80–94)
MONOCYTES # BLD AUTO: 0.7 X10(3)/MCL (ref 0.1–1.3)
MONOCYTES NFR BLD AUTO: 7.9 %
NEUTROPHILS # BLD AUTO: 6.8 X10(3)/MCL (ref 2.1–9.2)
NEUTROPHILS NFR BLD AUTO: 75 %
PLATELET # BLD AUTO: 10 X10(3)/MCL (ref 130–400)
PMV BLD AUTO: 12 FL (ref 9.4–12.4)
POTASSIUM SERPL-SCNC: 4.5 MMOL/L (ref 3.5–5.1)
PROT SERPL-MCNC: 6.4 GM/DL (ref 6.4–8.2)
RBC # BLD AUTO: 4.71 X10(6)/MCL (ref 4.7–6.1)
SODIUM SERPL-SCNC: 140 MMOL/L (ref 136–145)
WBC # SPEC AUTO: 9.1 X10(3)/MCL (ref 4.5–11.5)

## 2018-06-19 ENCOUNTER — HISTORICAL (OUTPATIENT)
Dept: INFUSION THERAPY | Facility: HOSPITAL | Age: 63
End: 2018-06-19

## 2018-06-19 LAB
ABS NEUT (OLG): 6.32 X10(3)/MCL (ref 2.1–9.2)
ALBUMIN SERPL-MCNC: 3.9 GM/DL (ref 3.4–5)
ALBUMIN/GLOB SERPL: 1.7 {RATIO}
ALP SERPL-CCNC: 45 UNIT/L (ref 50–136)
ALT SERPL-CCNC: 64 UNIT/L (ref 12–78)
AST SERPL-CCNC: 31 UNIT/L (ref 15–37)
BASOPHILS # BLD AUTO: 0 X10(3)/MCL (ref 0–0.2)
BASOPHILS NFR BLD AUTO: 0.2 %
BILIRUB SERPL-MCNC: 2.1 MG/DL (ref 0.2–1)
BILIRUBIN DIRECT+TOT PNL SERPL-MCNC: 0.4 MG/DL (ref 0–0.2)
BILIRUBIN DIRECT+TOT PNL SERPL-MCNC: 1.7 MG/DL (ref 0–0.8)
BUN SERPL-MCNC: 28 MG/DL (ref 7–18)
CALCIUM SERPL-MCNC: 8.9 MG/DL (ref 8.5–10.1)
CHLORIDE SERPL-SCNC: 103 MMOL/L (ref 98–107)
CO2 SERPL-SCNC: 29 MMOL/L (ref 21–32)
CREAT SERPL-MCNC: 0.98 MG/DL (ref 0.7–1.3)
EOSINOPHIL # BLD AUTO: 0.1 X10(3)/MCL (ref 0–0.9)
EOSINOPHIL NFR BLD AUTO: 1.1 %
ERYTHROCYTE [DISTWIDTH] IN BLOOD BY AUTOMATED COUNT: 14.4 % (ref 11.5–17)
GLOBULIN SER-MCNC: 2.3 GM/DL (ref 2.4–3.5)
GLUCOSE SERPL-MCNC: 128 MG/DL (ref 74–106)
HCT VFR BLD AUTO: 40.6 % (ref 42–52)
HGB BLD-MCNC: 14.4 GM/DL (ref 14–18)
LYMPHOCYTES # BLD AUTO: 1.2 X10(3)/MCL (ref 0.6–4.6)
LYMPHOCYTES NFR BLD AUTO: 14.3 %
MCH RBC QN AUTO: 31.2 PG (ref 27–31)
MCHC RBC AUTO-ENTMCNC: 35.5 GM/DL (ref 33–36)
MCV RBC AUTO: 88.1 FL (ref 80–94)
MONOCYTES # BLD AUTO: 0.8 X10(3)/MCL (ref 0.1–1.3)
MONOCYTES NFR BLD AUTO: 9.9 %
NEUTROPHILS # BLD AUTO: 6.3 X10(3)/MCL (ref 2.1–9.2)
NEUTROPHILS NFR BLD AUTO: 74.5 %
PLATELET # BLD AUTO: 8 X10(3)/MCL (ref 130–400)
PLATELET # BLD EST: NORMAL 10*3/UL
PMV BLD AUTO: 9.8 FL (ref 9.4–12.4)
POTASSIUM SERPL-SCNC: 4.1 MMOL/L (ref 3.5–5.1)
PROT SERPL-MCNC: 6.2 GM/DL (ref 6.4–8.2)
RBC # BLD AUTO: 4.61 X10(6)/MCL (ref 4.7–6.1)
RBC MORPH BLD: NORMAL
SODIUM SERPL-SCNC: 140 MMOL/L (ref 136–145)
WBC # SPEC AUTO: 8.5 X10(3)/MCL (ref 4.5–11.5)

## 2018-06-20 ENCOUNTER — HISTORICAL (OUTPATIENT)
Dept: ADMINISTRATIVE | Facility: HOSPITAL | Age: 63
End: 2018-06-20

## 2018-06-20 LAB
ABS NEUT (OLG): 7.18 X10(3)/MCL (ref 2.1–9.2)
BASOPHILS # BLD AUTO: 0 X10(3)/MCL (ref 0–0.2)
BASOPHILS NFR BLD AUTO: 0.1 %
EOSINOPHIL # BLD AUTO: 0.1 X10(3)/MCL (ref 0–0.9)
EOSINOPHIL NFR BLD AUTO: 0.7 %
ERYTHROCYTE [DISTWIDTH] IN BLOOD BY AUTOMATED COUNT: 14.6 % (ref 11.5–17)
HCT VFR BLD AUTO: 41.7 % (ref 42–52)
HGB BLD-MCNC: 14.6 GM/DL (ref 14–18)
LYMPHOCYTES # BLD AUTO: 1.2 X10(3)/MCL (ref 0.6–4.6)
LYMPHOCYTES NFR BLD AUTO: 13.1 %
MCH RBC QN AUTO: 31.1 PG (ref 27–31)
MCHC RBC AUTO-ENTMCNC: 35 GM/DL (ref 33–36)
MCV RBC AUTO: 88.7 FL (ref 80–94)
MONOCYTES # BLD AUTO: 0.7 X10(3)/MCL (ref 0.1–1.3)
MONOCYTES NFR BLD AUTO: 7.7 %
NEUTROPHILS # BLD AUTO: 7.2 X10(3)/MCL (ref 2.1–9.2)
NEUTROPHILS NFR BLD AUTO: 78.4 %
PLATELET # BLD AUTO: 12 X10(3)/MCL (ref 130–400)
PMV BLD AUTO: 11.5 FL (ref 9.4–12.4)
RBC # BLD AUTO: 4.7 X10(6)/MCL (ref 4.7–6.1)
WBC # SPEC AUTO: 9.2 X10(3)/MCL (ref 4.5–11.5)

## 2018-06-26 ENCOUNTER — HISTORICAL (OUTPATIENT)
Dept: HEMATOLOGY/ONCOLOGY | Facility: CLINIC | Age: 63
End: 2018-06-26

## 2018-06-26 LAB
ABS NEUT (OLG): 5.72 X10(3)/MCL (ref 2.1–9.2)
ALBUMIN SERPL-MCNC: 3.7 GM/DL (ref 3.4–5)
ALBUMIN/GLOB SERPL: 1.4 {RATIO}
ALP SERPL-CCNC: 51 UNIT/L (ref 50–136)
ALT SERPL-CCNC: 67 UNIT/L (ref 12–78)
AST SERPL-CCNC: 40 UNIT/L (ref 15–37)
BASOPHILS # BLD AUTO: 0 X10(3)/MCL (ref 0–0.2)
BASOPHILS NFR BLD AUTO: 0.2 %
BILIRUB SERPL-MCNC: 2 MG/DL (ref 0.2–1)
BILIRUBIN DIRECT+TOT PNL SERPL-MCNC: 0.3 MG/DL (ref 0–0.2)
BILIRUBIN DIRECT+TOT PNL SERPL-MCNC: 1.7 MG/DL (ref 0–0.8)
BUN SERPL-MCNC: 26 MG/DL (ref 7–18)
CALCIUM SERPL-MCNC: 9 MG/DL (ref 8.5–10.1)
CHLORIDE SERPL-SCNC: 104 MMOL/L (ref 98–107)
CO2 SERPL-SCNC: 29 MMOL/L (ref 21–32)
CREAT SERPL-MCNC: 0.91 MG/DL (ref 0.7–1.3)
EOSINOPHIL # BLD AUTO: 0.1 X10(3)/MCL (ref 0–0.9)
EOSINOPHIL NFR BLD AUTO: 1.4 %
ERYTHROCYTE [DISTWIDTH] IN BLOOD BY AUTOMATED COUNT: 14.6 % (ref 11.5–17)
GLOBULIN SER-MCNC: 2.7 GM/DL (ref 2.4–3.5)
GLUCOSE SERPL-MCNC: 135 MG/DL (ref 74–106)
HCT VFR BLD AUTO: 42.1 % (ref 42–52)
HGB BLD-MCNC: 14.8 GM/DL (ref 14–18)
LYMPHOCYTES # BLD AUTO: 1.8 X10(3)/MCL (ref 0.6–4.6)
LYMPHOCYTES NFR BLD AUTO: 20.8 %
MCH RBC QN AUTO: 31 PG (ref 27–31)
MCHC RBC AUTO-ENTMCNC: 35.2 GM/DL (ref 33–36)
MCV RBC AUTO: 88.1 FL (ref 80–94)
MONOCYTES # BLD AUTO: 0.8 X10(3)/MCL (ref 0.1–1.3)
MONOCYTES NFR BLD AUTO: 9.6 %
NEUTROPHILS # BLD AUTO: 5.7 X10(3)/MCL (ref 2.1–9.2)
NEUTROPHILS NFR BLD AUTO: 68 %
PLATELET # BLD AUTO: 12 X10(3)/MCL (ref 130–400)
PMV BLD AUTO: 12.4 FL (ref 9.4–12.4)
POTASSIUM SERPL-SCNC: 4.1 MMOL/L (ref 3.5–5.1)
PROT SERPL-MCNC: 6.4 GM/DL (ref 6.4–8.2)
RBC # BLD AUTO: 4.78 X10(6)/MCL (ref 4.7–6.1)
SODIUM SERPL-SCNC: 142 MMOL/L (ref 136–145)
WBC # SPEC AUTO: 8.4 X10(3)/MCL (ref 4.5–11.5)

## 2018-07-03 ENCOUNTER — HISTORICAL (OUTPATIENT)
Dept: ADMINISTRATIVE | Facility: HOSPITAL | Age: 63
End: 2018-07-03

## 2018-07-03 LAB
ABS NEUT (OLG): 6.4 X10(3)/MCL (ref 2.1–9.2)
BASOPHILS # BLD AUTO: 0 X10(3)/MCL (ref 0–0.2)
BASOPHILS NFR BLD AUTO: 0.1 %
EOSINOPHIL # BLD AUTO: 0.1 X10(3)/MCL (ref 0–0.9)
EOSINOPHIL NFR BLD AUTO: 0.8 %
ERYTHROCYTE [DISTWIDTH] IN BLOOD BY AUTOMATED COUNT: 14.6 % (ref 11.5–17)
HCT VFR BLD AUTO: 42.7 % (ref 42–52)
HGB BLD-MCNC: 14.8 GM/DL (ref 14–18)
LYMPHOCYTES # BLD AUTO: 0.9 X10(3)/MCL (ref 0.6–4.6)
LYMPHOCYTES NFR BLD AUTO: 11.1 %
MCH RBC QN AUTO: 30.6 PG (ref 27–31)
MCHC RBC AUTO-ENTMCNC: 34.7 GM/DL (ref 33–36)
MCV RBC AUTO: 88.2 FL (ref 80–94)
MONOCYTES # BLD AUTO: 0.6 X10(3)/MCL (ref 0.1–1.3)
MONOCYTES NFR BLD AUTO: 8 %
NEUTROPHILS # BLD AUTO: 6.4 X10(3)/MCL (ref 2.1–9.2)
NEUTROPHILS NFR BLD AUTO: 80 %
PLATELET # BLD AUTO: 30 X10(3)/MCL (ref 130–400)
PMV BLD AUTO: 11.1 FL (ref 9.4–12.4)
RBC # BLD AUTO: 4.84 X10(6)/MCL (ref 4.7–6.1)
WBC # SPEC AUTO: 8 X10(3)/MCL (ref 4.5–11.5)

## 2018-07-09 ENCOUNTER — HISTORICAL (OUTPATIENT)
Dept: HEMATOLOGY/ONCOLOGY | Facility: CLINIC | Age: 63
End: 2018-07-09

## 2018-07-09 LAB
ABS NEUT (OLG): 5.85 X10(3)/MCL (ref 2.1–9.2)
BASOPHILS # BLD AUTO: 0 X10(3)/MCL (ref 0–0.2)
BASOPHILS NFR BLD AUTO: 0.3 %
EOSINOPHIL # BLD AUTO: 0.2 X10(3)/MCL (ref 0–0.9)
EOSINOPHIL NFR BLD AUTO: 2.3 %
ERYTHROCYTE [DISTWIDTH] IN BLOOD BY AUTOMATED COUNT: 14.5 % (ref 11.5–17)
HCT VFR BLD AUTO: 43.8 % (ref 42–52)
HGB BLD-MCNC: 15.4 GM/DL (ref 14–18)
LYMPHOCYTES # BLD AUTO: 2.1 X10(3)/MCL (ref 0.6–4.6)
LYMPHOCYTES NFR BLD AUTO: 23 %
MCH RBC QN AUTO: 30.6 PG (ref 27–31)
MCHC RBC AUTO-ENTMCNC: 35.2 GM/DL (ref 33–36)
MCV RBC AUTO: 87.1 FL (ref 80–94)
MONOCYTES # BLD AUTO: 1 X10(3)/MCL (ref 0.1–1.3)
MONOCYTES NFR BLD AUTO: 11.1 %
NEUTROPHILS # BLD AUTO: 5.8 X10(3)/MCL (ref 2.1–9.2)
NEUTROPHILS NFR BLD AUTO: 63.3 %
PLATELET # BLD AUTO: 52 X10(3)/MCL (ref 130–400)
PMV BLD AUTO: 10.8 FL (ref 9.4–12.4)
RBC # BLD AUTO: 5.03 X10(6)/MCL (ref 4.7–6.1)
WBC # SPEC AUTO: 9.2 X10(3)/MCL (ref 4.5–11.5)

## 2018-07-17 ENCOUNTER — HISTORICAL (OUTPATIENT)
Dept: ADMINISTRATIVE | Facility: HOSPITAL | Age: 63
End: 2018-07-17

## 2018-07-17 LAB
ABS NEUT (OLG): 6.67 X10(3)/MCL (ref 2.1–9.2)
ALBUMIN SERPL-MCNC: 3.8 GM/DL (ref 3.4–5)
ALBUMIN/GLOB SERPL: 1.2 {RATIO}
ALP SERPL-CCNC: 70 UNIT/L (ref 50–136)
ALT SERPL-CCNC: 73 UNIT/L (ref 12–78)
AST SERPL-CCNC: 31 UNIT/L (ref 15–37)
BASOPHILS # BLD AUTO: 0 X10(3)/MCL (ref 0–0.2)
BASOPHILS NFR BLD AUTO: 0.2 %
BILIRUB SERPL-MCNC: 1.3 MG/DL (ref 0.2–1)
BILIRUBIN DIRECT+TOT PNL SERPL-MCNC: 0.2 MG/DL (ref 0–0.2)
BILIRUBIN DIRECT+TOT PNL SERPL-MCNC: 1.1 MG/DL (ref 0–0.8)
BUN SERPL-MCNC: 21 MG/DL (ref 7–18)
CALCIUM SERPL-MCNC: 9.4 MG/DL (ref 8.5–10.1)
CHLORIDE SERPL-SCNC: 107 MMOL/L (ref 98–107)
CO2 SERPL-SCNC: 23 MMOL/L (ref 21–32)
CREAT SERPL-MCNC: 0.91 MG/DL (ref 0.7–1.3)
EOSINOPHIL # BLD AUTO: 0 X10(3)/MCL (ref 0–0.9)
EOSINOPHIL NFR BLD AUTO: 0.5 %
ERYTHROCYTE [DISTWIDTH] IN BLOOD BY AUTOMATED COUNT: 14.5 % (ref 11.5–17)
GLOBULIN SER-MCNC: 3.1 GM/DL (ref 2.4–3.5)
GLUCOSE SERPL-MCNC: 138 MG/DL (ref 74–106)
HCT VFR BLD AUTO: 44 % (ref 42–52)
HGB BLD-MCNC: 15.4 GM/DL (ref 14–18)
LYMPHOCYTES # BLD AUTO: 1.4 X10(3)/MCL (ref 0.6–4.6)
LYMPHOCYTES NFR BLD AUTO: 16.3 %
MCH RBC QN AUTO: 30.5 PG (ref 27–31)
MCHC RBC AUTO-ENTMCNC: 35 GM/DL (ref 33–36)
MCV RBC AUTO: 87.1 FL (ref 80–94)
MONOCYTES # BLD AUTO: 0.5 X10(3)/MCL (ref 0.1–1.3)
MONOCYTES NFR BLD AUTO: 5.9 %
NEUTROPHILS # BLD AUTO: 6.7 X10(3)/MCL (ref 2.1–9.2)
NEUTROPHILS NFR BLD AUTO: 77.1 %
PLATELET # BLD AUTO: 92 X10(3)/MCL (ref 130–400)
PMV BLD AUTO: 10.5 FL (ref 9.4–12.4)
POTASSIUM SERPL-SCNC: 4.5 MMOL/L (ref 3.5–5.1)
PROT SERPL-MCNC: 6.9 GM/DL (ref 6.4–8.2)
RBC # BLD AUTO: 5.05 X10(6)/MCL (ref 4.7–6.1)
SODIUM SERPL-SCNC: 141 MMOL/L (ref 136–145)
WBC # SPEC AUTO: 8.6 X10(3)/MCL (ref 4.5–11.5)

## 2018-07-24 ENCOUNTER — HISTORICAL (OUTPATIENT)
Dept: HEMATOLOGY/ONCOLOGY | Facility: CLINIC | Age: 63
End: 2018-07-24

## 2018-07-24 LAB
ABS NEUT (OLG): 3.83 X10(3)/MCL (ref 2.1–9.2)
ALBUMIN SERPL-MCNC: 3.5 GM/DL (ref 3.4–5)
ALBUMIN/GLOB SERPL: 1.4 RATIO (ref 1.1–2)
ALP SERPL-CCNC: 60 UNIT/L (ref 50–136)
ALT SERPL-CCNC: 60 UNIT/L (ref 12–78)
AST SERPL-CCNC: 26 UNIT/L (ref 15–37)
BASOPHILS # BLD AUTO: 0 X10(3)/MCL (ref 0–0.2)
BASOPHILS NFR BLD AUTO: 0.3 %
BILIRUB SERPL-MCNC: 1.2 MG/DL (ref 0.2–1)
BILIRUBIN DIRECT+TOT PNL SERPL-MCNC: 0.2 MG/DL (ref 0–0.5)
BILIRUBIN DIRECT+TOT PNL SERPL-MCNC: 1 MG/DL (ref 0–0.8)
BUN SERPL-MCNC: 19 MG/DL (ref 7–18)
CALCIUM SERPL-MCNC: 8.9 MG/DL (ref 8.5–10.1)
CHLORIDE SERPL-SCNC: 107 MMOL/L (ref 98–107)
CO2 SERPL-SCNC: 28 MMOL/L (ref 21–32)
CREAT SERPL-MCNC: 0.87 MG/DL (ref 0.7–1.3)
EOSINOPHIL # BLD AUTO: 0.1 X10(3)/MCL (ref 0–0.9)
EOSINOPHIL NFR BLD AUTO: 1.8 %
ERYTHROCYTE [DISTWIDTH] IN BLOOD BY AUTOMATED COUNT: 14.1 % (ref 11.5–17)
GLOBULIN SER-MCNC: 2.5 GM/DL (ref 2.4–3.5)
GLUCOSE SERPL-MCNC: 118 MG/DL (ref 74–106)
HCT VFR BLD AUTO: 41.4 % (ref 42–52)
HGB BLD-MCNC: 14.3 GM/DL (ref 14–18)
LYMPHOCYTES # BLD AUTO: 1.4 X10(3)/MCL (ref 0.6–4.6)
LYMPHOCYTES NFR BLD AUTO: 23.8 %
MCH RBC QN AUTO: 30.5 PG (ref 27–31)
MCHC RBC AUTO-ENTMCNC: 34.5 GM/DL (ref 33–36)
MCV RBC AUTO: 88.3 FL (ref 80–94)
MONOCYTES # BLD AUTO: 0.7 X10(3)/MCL (ref 0.1–1.3)
MONOCYTES NFR BLD AUTO: 10.9 %
NEUTROPHILS # BLD AUTO: 3.8 X10(3)/MCL (ref 2.1–9.2)
NEUTROPHILS NFR BLD AUTO: 63.2 %
PLATELET # BLD AUTO: 72 X10(3)/MCL (ref 130–400)
PMV BLD AUTO: 10.5 FL (ref 9.4–12.4)
POTASSIUM SERPL-SCNC: 4 MMOL/L (ref 3.5–5.1)
PROT SERPL-MCNC: 6 GM/DL (ref 6.4–8.2)
RBC # BLD AUTO: 4.69 X10(6)/MCL (ref 4.7–6.1)
SODIUM SERPL-SCNC: 143 MMOL/L (ref 136–145)
WBC # SPEC AUTO: 6.1 X10(3)/MCL (ref 4.5–11.5)

## 2018-07-31 ENCOUNTER — HISTORICAL (OUTPATIENT)
Dept: HEMATOLOGY/ONCOLOGY | Facility: CLINIC | Age: 63
End: 2018-07-31

## 2018-07-31 LAB
ABS NEUT (OLG): 4.42 X10(3)/MCL (ref 2.1–9.2)
BASOPHILS # BLD AUTO: 0 X10(3)/MCL (ref 0–0.2)
BASOPHILS NFR BLD AUTO: 0.6 %
EOSINOPHIL # BLD AUTO: 0.1 X10(3)/MCL (ref 0–0.9)
EOSINOPHIL NFR BLD AUTO: 1.7 %
ERYTHROCYTE [DISTWIDTH] IN BLOOD BY AUTOMATED COUNT: 14.1 % (ref 11.5–17)
HCT VFR BLD AUTO: 43.7 % (ref 42–52)
HGB BLD-MCNC: 15 GM/DL (ref 14–18)
LYMPHOCYTES # BLD AUTO: 1.7 X10(3)/MCL (ref 0.6–4.6)
LYMPHOCYTES NFR BLD AUTO: 24.4 %
MCH RBC QN AUTO: 30.1 PG (ref 27–31)
MCHC RBC AUTO-ENTMCNC: 34.3 GM/DL (ref 33–36)
MCV RBC AUTO: 87.6 FL (ref 80–94)
MONOCYTES # BLD AUTO: 0.8 X10(3)/MCL (ref 0.1–1.3)
MONOCYTES NFR BLD AUTO: 10.6 %
NEUTROPHILS # BLD AUTO: 4.4 X10(3)/MCL (ref 2.1–9.2)
NEUTROPHILS NFR BLD AUTO: 62.7 %
PLATELET # BLD AUTO: 101 X10(3)/MCL (ref 130–400)
PMV BLD AUTO: 10.3 FL (ref 9.4–12.4)
RBC # BLD AUTO: 4.99 X10(6)/MCL (ref 4.7–6.1)
WBC # SPEC AUTO: 7 X10(3)/MCL (ref 4.5–11.5)

## 2018-08-07 ENCOUNTER — HISTORICAL (OUTPATIENT)
Dept: HEMATOLOGY/ONCOLOGY | Facility: CLINIC | Age: 63
End: 2018-08-07

## 2018-08-07 LAB
ABS NEUT (OLG): 3.9 X10(3)/MCL (ref 2.1–9.2)
ALBUMIN SERPL-MCNC: 3.9 GM/DL (ref 3.4–5)
ALBUMIN/GLOB SERPL: 1.3 RATIO (ref 1.1–2)
ALP SERPL-CCNC: 56 UNIT/L (ref 50–136)
ALT SERPL-CCNC: 53 UNIT/L (ref 12–78)
AST SERPL-CCNC: 22 UNIT/L (ref 15–37)
BASOPHILS # BLD AUTO: 0 X10(3)/MCL (ref 0–0.2)
BASOPHILS NFR BLD AUTO: 0.4 %
BILIRUB SERPL-MCNC: 0.9 MG/DL (ref 0.2–1)
BILIRUBIN DIRECT+TOT PNL SERPL-MCNC: 0.2 MG/DL (ref 0–0.5)
BILIRUBIN DIRECT+TOT PNL SERPL-MCNC: 0.7 MG/DL (ref 0–0.8)
BUN SERPL-MCNC: 17 MG/DL (ref 7–18)
CALCIUM SERPL-MCNC: 9.4 MG/DL (ref 8.5–10.1)
CHLORIDE SERPL-SCNC: 107 MMOL/L (ref 98–107)
CO2 SERPL-SCNC: 30 MMOL/L (ref 21–32)
CREAT SERPL-MCNC: 0.83 MG/DL (ref 0.7–1.3)
EOSINOPHIL # BLD AUTO: 0.1 X10(3)/MCL (ref 0–0.9)
EOSINOPHIL NFR BLD AUTO: 1.5 %
ERYTHROCYTE [DISTWIDTH] IN BLOOD BY AUTOMATED COUNT: 13.8 % (ref 11.5–17)
GLOBULIN SER-MCNC: 2.9 GM/DL (ref 2.4–3.5)
GLUCOSE SERPL-MCNC: 70 MG/DL (ref 74–106)
HCT VFR BLD AUTO: 45.1 % (ref 42–52)
HGB BLD-MCNC: 15.5 GM/DL (ref 14–18)
LYMPHOCYTES # BLD AUTO: 2.1 X10(3)/MCL (ref 0.6–4.6)
LYMPHOCYTES NFR BLD AUTO: 29.1 %
MCH RBC QN AUTO: 29.9 PG (ref 27–31)
MCHC RBC AUTO-ENTMCNC: 34.4 GM/DL (ref 33–36)
MCV RBC AUTO: 86.9 FL (ref 80–94)
MONOCYTES # BLD AUTO: 1 X10(3)/MCL (ref 0.1–1.3)
MONOCYTES NFR BLD AUTO: 14.4 %
NEUTROPHILS # BLD AUTO: 3.9 X10(3)/MCL (ref 2.1–9.2)
NEUTROPHILS NFR BLD AUTO: 54.6 %
PLATELET # BLD AUTO: 112 X10(3)/MCL (ref 130–400)
PMV BLD AUTO: 10.3 FL (ref 9.4–12.4)
POTASSIUM SERPL-SCNC: 4.5 MMOL/L (ref 3.5–5.1)
PROT SERPL-MCNC: 6.8 GM/DL (ref 6.4–8.2)
RBC # BLD AUTO: 5.19 X10(6)/MCL (ref 4.7–6.1)
SODIUM SERPL-SCNC: 143 MMOL/L (ref 136–145)
WBC # SPEC AUTO: 7.2 X10(3)/MCL (ref 4.5–11.5)

## 2018-08-14 ENCOUNTER — HISTORICAL (OUTPATIENT)
Dept: ADMINISTRATIVE | Facility: HOSPITAL | Age: 63
End: 2018-08-14

## 2018-08-14 LAB
ABS NEUT (OLG): 3.57 X10(3)/MCL (ref 2.1–9.2)
BASOPHILS # BLD AUTO: 0 X10(3)/MCL (ref 0–0.2)
BASOPHILS NFR BLD AUTO: 0.6 %
EOSINOPHIL # BLD AUTO: 0.1 X10(3)/MCL (ref 0–0.9)
EOSINOPHIL NFR BLD AUTO: 1.2 %
ERYTHROCYTE [DISTWIDTH] IN BLOOD BY AUTOMATED COUNT: 13.6 % (ref 11.5–17)
HCT VFR BLD AUTO: 45.5 % (ref 42–52)
HGB BLD-MCNC: 15.8 GM/DL (ref 14–18)
LYMPHOCYTES # BLD AUTO: 2.1 X10(3)/MCL (ref 0.6–4.6)
LYMPHOCYTES NFR BLD AUTO: 31.9 %
MCH RBC QN AUTO: 30 PG (ref 27–31)
MCHC RBC AUTO-ENTMCNC: 34.7 GM/DL (ref 33–36)
MCV RBC AUTO: 86.3 FL (ref 80–94)
MONOCYTES # BLD AUTO: 0.8 X10(3)/MCL (ref 0.1–1.3)
MONOCYTES NFR BLD AUTO: 12.6 %
NEUTROPHILS # BLD AUTO: 3.6 X10(3)/MCL (ref 2.1–9.2)
NEUTROPHILS NFR BLD AUTO: 53.7 %
PLATELET # BLD AUTO: 102 X10(3)/MCL (ref 130–400)
PMV BLD AUTO: 10.8 FL (ref 9.4–12.4)
RBC # BLD AUTO: 5.27 X10(6)/MCL (ref 4.7–6.1)
WBC # SPEC AUTO: 6.6 X10(3)/MCL (ref 4.5–11.5)

## 2018-08-20 ENCOUNTER — HISTORICAL (OUTPATIENT)
Dept: HEMATOLOGY/ONCOLOGY | Facility: CLINIC | Age: 63
End: 2018-08-20

## 2018-08-20 LAB
ABS NEUT (OLG): 1.77 X10(3)/MCL (ref 2.1–9.2)
BASOPHILS # BLD AUTO: 0 X10(3)/MCL (ref 0–0.2)
BASOPHILS NFR BLD AUTO: 0.6 %
EOSINOPHIL # BLD AUTO: 0.2 X10(3)/MCL (ref 0–0.9)
EOSINOPHIL NFR BLD AUTO: 3.2 %
ERYTHROCYTE [DISTWIDTH] IN BLOOD BY AUTOMATED COUNT: 13.4 % (ref 11.5–17)
HCT VFR BLD AUTO: 42.5 % (ref 42–52)
HGB BLD-MCNC: 14.9 GM/DL (ref 14–18)
LYMPHOCYTES # BLD AUTO: 2 X10(3)/MCL (ref 0.6–4.6)
LYMPHOCYTES NFR BLD AUTO: 43.1 %
MCH RBC QN AUTO: 30 PG (ref 27–31)
MCHC RBC AUTO-ENTMCNC: 35.1 GM/DL (ref 33–36)
MCV RBC AUTO: 85.5 FL (ref 80–94)
MONOCYTES # BLD AUTO: 0.7 X10(3)/MCL (ref 0.1–1.3)
MONOCYTES NFR BLD AUTO: 15 %
NEUTROPHILS # BLD AUTO: 1.8 X10(3)/MCL (ref 2.1–9.2)
NEUTROPHILS NFR BLD AUTO: 38.1 %
PLATELET # BLD AUTO: 114 X10(3)/MCL (ref 130–400)
PMV BLD AUTO: 10.6 FL (ref 9.4–12.4)
RBC # BLD AUTO: 4.97 X10(6)/MCL (ref 4.7–6.1)
WBC # SPEC AUTO: 4.7 X10(3)/MCL (ref 4.5–11.5)

## 2018-08-27 ENCOUNTER — HISTORICAL (OUTPATIENT)
Dept: HEMATOLOGY/ONCOLOGY | Facility: CLINIC | Age: 63
End: 2018-08-27

## 2018-08-27 LAB
ABS NEUT (OLG): 1.94 X10(3)/MCL (ref 2.1–9.2)
ALBUMIN SERPL-MCNC: 3.6 GM/DL (ref 3.4–5)
ALBUMIN/GLOB SERPL: 1.3 RATIO (ref 1.1–2)
ALP SERPL-CCNC: 55 UNIT/L (ref 50–136)
ALT SERPL-CCNC: 30 UNIT/L (ref 12–78)
AST SERPL-CCNC: 16 UNIT/L (ref 15–37)
BASOPHILS # BLD AUTO: 0 X10(3)/MCL (ref 0–0.2)
BASOPHILS NFR BLD AUTO: 0.6 %
BILIRUB SERPL-MCNC: 1 MG/DL (ref 0.2–1)
BILIRUBIN DIRECT+TOT PNL SERPL-MCNC: 0.2 MG/DL (ref 0–0.5)
BILIRUBIN DIRECT+TOT PNL SERPL-MCNC: 0.8 MG/DL (ref 0–0.8)
BUN SERPL-MCNC: 22 MG/DL (ref 7–18)
CALCIUM SERPL-MCNC: 9.4 MG/DL (ref 8.5–10.1)
CHLORIDE SERPL-SCNC: 109 MMOL/L (ref 98–107)
CO2 SERPL-SCNC: 28 MMOL/L (ref 21–32)
CREAT SERPL-MCNC: 0.82 MG/DL (ref 0.7–1.3)
EOSINOPHIL # BLD AUTO: 0.2 X10(3)/MCL (ref 0–0.9)
EOSINOPHIL NFR BLD AUTO: 3.4 %
ERYTHROCYTE [DISTWIDTH] IN BLOOD BY AUTOMATED COUNT: 13.3 % (ref 11.5–17)
GLOBULIN SER-MCNC: 2.7 GM/DL (ref 2.4–3.5)
GLUCOSE SERPL-MCNC: 88 MG/DL (ref 74–106)
HCT VFR BLD AUTO: 42.7 % (ref 42–52)
HGB BLD-MCNC: 15 GM/DL (ref 14–18)
LYMPHOCYTES # BLD AUTO: 1.9 X10(3)/MCL (ref 0.6–4.6)
LYMPHOCYTES NFR BLD AUTO: 39.7 %
MCH RBC QN AUTO: 30 PG (ref 27–31)
MCHC RBC AUTO-ENTMCNC: 35.1 GM/DL (ref 33–36)
MCV RBC AUTO: 85.4 FL (ref 80–94)
MONOCYTES # BLD AUTO: 0.7 X10(3)/MCL (ref 0.1–1.3)
MONOCYTES NFR BLD AUTO: 14.7 %
NEUTROPHILS # BLD AUTO: 1.9 X10(3)/MCL (ref 2.1–9.2)
NEUTROPHILS NFR BLD AUTO: 41.6 %
PLATELET # BLD AUTO: 130 X10(3)/MCL (ref 130–400)
PMV BLD AUTO: 10.6 FL (ref 9.4–12.4)
POTASSIUM SERPL-SCNC: 4.4 MMOL/L (ref 3.5–5.1)
PROT SERPL-MCNC: 6.3 GM/DL (ref 6.4–8.2)
RBC # BLD AUTO: 5 X10(6)/MCL (ref 4.7–6.1)
SODIUM SERPL-SCNC: 142 MMOL/L (ref 136–145)
WBC # SPEC AUTO: 4.7 X10(3)/MCL (ref 4.5–11.5)

## 2018-09-04 ENCOUNTER — HISTORICAL (OUTPATIENT)
Dept: HEMATOLOGY/ONCOLOGY | Facility: CLINIC | Age: 63
End: 2018-09-04

## 2018-09-04 LAB
ABS NEUT (OLG): 2.14 X10(3)/MCL (ref 2.1–9.2)
BASOPHILS # BLD AUTO: 0 X10(3)/MCL (ref 0–0.2)
BASOPHILS NFR BLD AUTO: 0.7 %
EOSINOPHIL # BLD AUTO: 0.2 X10(3)/MCL (ref 0–0.9)
EOSINOPHIL NFR BLD AUTO: 3.3 %
ERYTHROCYTE [DISTWIDTH] IN BLOOD BY AUTOMATED COUNT: 13.4 % (ref 11.5–17)
HCT VFR BLD AUTO: 45.6 % (ref 42–52)
HGB BLD-MCNC: 16.1 GM/DL (ref 14–18)
LYMPHOCYTES # BLD AUTO: 2.6 X10(3)/MCL (ref 0.6–4.6)
LYMPHOCYTES NFR BLD AUTO: 44.9 %
MCH RBC QN AUTO: 29.9 PG (ref 27–31)
MCHC RBC AUTO-ENTMCNC: 35.3 GM/DL (ref 33–36)
MCV RBC AUTO: 84.6 FL (ref 80–94)
MONOCYTES # BLD AUTO: 0.8 X10(3)/MCL (ref 0.1–1.3)
MONOCYTES NFR BLD AUTO: 14 %
NEUTROPHILS # BLD AUTO: 2.1 X10(3)/MCL (ref 2.1–9.2)
NEUTROPHILS NFR BLD AUTO: 37.1 %
PLATELET # BLD AUTO: 148 X10(3)/MCL (ref 130–400)
PMV BLD AUTO: 10.6 FL (ref 9.4–12.4)
RBC # BLD AUTO: 5.39 X10(6)/MCL (ref 4.7–6.1)
WBC # SPEC AUTO: 5.8 X10(3)/MCL (ref 4.5–11.5)

## 2018-09-10 ENCOUNTER — HISTORICAL (OUTPATIENT)
Dept: ADMINISTRATIVE | Facility: HOSPITAL | Age: 63
End: 2018-09-10

## 2018-09-10 LAB
ABS NEUT (OLG): 2.29 X10(3)/MCL (ref 2.1–9.2)
ALBUMIN SERPL-MCNC: 3.8 GM/DL (ref 3.4–5)
ALBUMIN/GLOB SERPL: 1.5 {RATIO}
ALP SERPL-CCNC: 63 UNIT/L (ref 50–136)
ALT SERPL-CCNC: 32 UNIT/L (ref 12–78)
AST SERPL-CCNC: 20 UNIT/L (ref 15–37)
BASOPHILS # BLD AUTO: 0 X10(3)/MCL (ref 0–0.2)
BASOPHILS NFR BLD AUTO: 0.4 %
BILIRUB SERPL-MCNC: 0.8 MG/DL (ref 0.2–1)
BILIRUBIN DIRECT+TOT PNL SERPL-MCNC: 0.2 MG/DL (ref 0–0.2)
BILIRUBIN DIRECT+TOT PNL SERPL-MCNC: 0.6 MG/DL (ref 0–0.8)
BUN SERPL-MCNC: 21 MG/DL (ref 7–18)
CALCIUM SERPL-MCNC: 8.9 MG/DL (ref 8.5–10.1)
CHLORIDE SERPL-SCNC: 108 MMOL/L (ref 98–107)
CO2 SERPL-SCNC: 31 MMOL/L (ref 21–32)
CREAT SERPL-MCNC: 1.12 MG/DL (ref 0.7–1.3)
EOSINOPHIL # BLD AUTO: 0.2 X10(3)/MCL (ref 0–0.9)
EOSINOPHIL NFR BLD AUTO: 4.8 %
ERYTHROCYTE [DISTWIDTH] IN BLOOD BY AUTOMATED COUNT: 13.4 % (ref 11.5–17)
GLOBULIN SER-MCNC: 2.5 GM/DL (ref 2.4–3.5)
GLUCOSE SERPL-MCNC: 95 MG/DL (ref 74–106)
HCT VFR BLD AUTO: 42.5 % (ref 42–52)
HGB BLD-MCNC: 15 GM/DL (ref 14–18)
LYMPHOCYTES # BLD AUTO: 2 X10(3)/MCL (ref 0.6–4.6)
LYMPHOCYTES NFR BLD AUTO: 37.9 %
MCH RBC QN AUTO: 29.6 PG (ref 27–31)
MCHC RBC AUTO-ENTMCNC: 35.3 GM/DL (ref 33–36)
MCV RBC AUTO: 84 FL (ref 80–94)
MONOCYTES # BLD AUTO: 0.7 X10(3)/MCL (ref 0.1–1.3)
MONOCYTES NFR BLD AUTO: 13 %
NEUTROPHILS # BLD AUTO: 2.3 X10(3)/MCL (ref 2.1–9.2)
NEUTROPHILS NFR BLD AUTO: 43.9 %
PLATELET # BLD AUTO: 117 X10(3)/MCL (ref 130–400)
PMV BLD AUTO: 10.6 FL (ref 9.4–12.4)
POTASSIUM SERPL-SCNC: 4.9 MMOL/L (ref 3.5–5.1)
PROT SERPL-MCNC: 6.3 GM/DL (ref 6.4–8.2)
RBC # BLD AUTO: 5.06 X10(6)/MCL (ref 4.7–6.1)
SODIUM SERPL-SCNC: 144 MMOL/L (ref 136–145)
WBC # SPEC AUTO: 5.2 X10(3)/MCL (ref 4.5–11.5)

## 2018-09-24 ENCOUNTER — HISTORICAL (OUTPATIENT)
Dept: HEMATOLOGY/ONCOLOGY | Facility: CLINIC | Age: 63
End: 2018-09-24

## 2018-09-24 LAB
ABS NEUT (OLG): 2.57 X10(3)/MCL (ref 2.1–9.2)
BASOPHILS # BLD AUTO: 0 X10(3)/MCL (ref 0–0.2)
BASOPHILS NFR BLD AUTO: 0.5 %
EOSINOPHIL # BLD AUTO: 0.6 X10(3)/MCL (ref 0–0.9)
EOSINOPHIL NFR BLD AUTO: 9.4 %
ERYTHROCYTE [DISTWIDTH] IN BLOOD BY AUTOMATED COUNT: 13.5 % (ref 11.5–17)
HCT VFR BLD AUTO: 45.3 % (ref 42–52)
HGB BLD-MCNC: 16.2 GM/DL (ref 14–18)
LYMPHOCYTES # BLD AUTO: 2.4 X10(3)/MCL (ref 0.6–4.6)
LYMPHOCYTES NFR BLD AUTO: 37.2 %
MCH RBC QN AUTO: 29.6 PG (ref 27–31)
MCHC RBC AUTO-ENTMCNC: 35.8 GM/DL (ref 33–36)
MCV RBC AUTO: 82.7 FL (ref 80–94)
MONOCYTES # BLD AUTO: 0.8 X10(3)/MCL (ref 0.1–1.3)
MONOCYTES NFR BLD AUTO: 12.8 %
NEUTROPHILS # BLD AUTO: 2.6 X10(3)/MCL (ref 2.1–9.2)
NEUTROPHILS NFR BLD AUTO: 40.1 %
PLATELET # BLD AUTO: 99 X10(3)/MCL (ref 130–400)
PMV BLD AUTO: 10.4 FL (ref 9.4–12.4)
RBC # BLD AUTO: 5.48 X10(6)/MCL (ref 4.7–6.1)
WBC # SPEC AUTO: 6.4 X10(3)/MCL (ref 4.5–11.5)

## 2018-10-08 ENCOUNTER — HISTORICAL (OUTPATIENT)
Dept: HEMATOLOGY/ONCOLOGY | Facility: CLINIC | Age: 63
End: 2018-10-08

## 2018-10-08 LAB
ABS NEUT (OLG): 2.22 X10(3)/MCL (ref 2.1–9.2)
ALBUMIN SERPL-MCNC: 3.6 GM/DL (ref 3.4–5)
ALBUMIN/GLOB SERPL: 1.3 {RATIO}
ALP SERPL-CCNC: 67 UNIT/L (ref 50–136)
ALT SERPL-CCNC: 28 UNIT/L (ref 12–78)
AST SERPL-CCNC: 14 UNIT/L (ref 15–37)
BASOPHILS # BLD AUTO: 0 X10(3)/MCL (ref 0–0.2)
BASOPHILS NFR BLD AUTO: 0.3 %
BILIRUB SERPL-MCNC: 1 MG/DL (ref 0.2–1)
BILIRUBIN DIRECT+TOT PNL SERPL-MCNC: 0.2 MG/DL (ref 0–0.2)
BILIRUBIN DIRECT+TOT PNL SERPL-MCNC: 0.8 MG/DL (ref 0–0.8)
BUN SERPL-MCNC: 20 MG/DL (ref 7–18)
CALCIUM SERPL-MCNC: 8.3 MG/DL (ref 8.5–10.1)
CHLORIDE SERPL-SCNC: 110 MMOL/L (ref 98–107)
CO2 SERPL-SCNC: 27 MMOL/L (ref 21–32)
CREAT SERPL-MCNC: 0.82 MG/DL (ref 0.7–1.3)
EOSINOPHIL # BLD AUTO: 1.5 X10(3)/MCL (ref 0–0.9)
EOSINOPHIL NFR BLD AUTO: 21.6 %
ERYTHROCYTE [DISTWIDTH] IN BLOOD BY AUTOMATED COUNT: 13.8 % (ref 11.5–17)
GLOBULIN SER-MCNC: 2.8 GM/DL (ref 2.4–3.5)
GLUCOSE SERPL-MCNC: 111 MG/DL (ref 74–106)
HCT VFR BLD AUTO: 44.2 % (ref 42–52)
HGB BLD-MCNC: 15.6 GM/DL (ref 14–18)
LYMPHOCYTES # BLD AUTO: 2.4 X10(3)/MCL (ref 0.6–4.6)
LYMPHOCYTES NFR BLD AUTO: 35.5 %
MCH RBC QN AUTO: 29.4 PG (ref 27–31)
MCHC RBC AUTO-ENTMCNC: 35.3 GM/DL (ref 33–36)
MCV RBC AUTO: 83.4 FL (ref 80–94)
MONOCYTES # BLD AUTO: 0.7 X10(3)/MCL (ref 0.1–1.3)
MONOCYTES NFR BLD AUTO: 10.1 %
NEUTROPHILS # BLD AUTO: 2.2 X10(3)/MCL (ref 2.1–9.2)
NEUTROPHILS NFR BLD AUTO: 32.5 %
PLATELET # BLD AUTO: 121 X10(3)/MCL (ref 130–400)
PMV BLD AUTO: 10.3 FL (ref 9.4–12.4)
POTASSIUM SERPL-SCNC: 4.3 MMOL/L (ref 3.5–5.1)
PROT SERPL-MCNC: 6.4 GM/DL (ref 6.4–8.2)
RBC # BLD AUTO: 5.3 X10(6)/MCL (ref 4.7–6.1)
SODIUM SERPL-SCNC: 143 MMOL/L (ref 136–145)
WBC # SPEC AUTO: 6.8 X10(3)/MCL (ref 4.5–11.5)

## 2018-10-22 ENCOUNTER — HISTORICAL (OUTPATIENT)
Dept: HEMATOLOGY/ONCOLOGY | Facility: CLINIC | Age: 63
End: 2018-10-22

## 2018-10-22 LAB
ABS NEUT (OLG): 2.13 X10(3)/MCL (ref 2.1–9.2)
BASOPHILS # BLD AUTO: 0 X10(3)/MCL (ref 0–0.2)
BASOPHILS NFR BLD AUTO: 0.4 %
EOSINOPHIL # BLD AUTO: 1.5 X10(3)/MCL (ref 0–0.9)
EOSINOPHIL NFR BLD AUTO: 21.2 %
ERYTHROCYTE [DISTWIDTH] IN BLOOD BY AUTOMATED COUNT: 14.3 % (ref 11.5–17)
HCT VFR BLD AUTO: 44.4 % (ref 42–52)
HGB BLD-MCNC: 15.5 GM/DL (ref 14–18)
LYMPHOCYTES # BLD AUTO: 2.7 X10(3)/MCL (ref 0.6–4.6)
LYMPHOCYTES NFR BLD AUTO: 37.3 %
MCH RBC QN AUTO: 29 PG (ref 27–31)
MCHC RBC AUTO-ENTMCNC: 34.9 GM/DL (ref 33–36)
MCV RBC AUTO: 83.1 FL (ref 80–94)
MONOCYTES # BLD AUTO: 0.8 X10(3)/MCL (ref 0.1–1.3)
MONOCYTES NFR BLD AUTO: 11.5 %
NEUTROPHILS # BLD AUTO: 2.1 X10(3)/MCL (ref 2.1–9.2)
NEUTROPHILS NFR BLD AUTO: 29.6 %
PLATELET # BLD AUTO: 104 X10(3)/MCL (ref 130–400)
PMV BLD AUTO: 10.5 FL (ref 9.4–12.4)
RBC # BLD AUTO: 5.34 X10(6)/MCL (ref 4.7–6.1)
WBC # SPEC AUTO: 7.2 X10(3)/MCL (ref 4.5–11.5)

## 2018-11-01 ENCOUNTER — HISTORICAL (OUTPATIENT)
Dept: ADMINISTRATIVE | Facility: HOSPITAL | Age: 63
End: 2018-11-01

## 2018-11-01 LAB
ABS NEUT (OLG): 2.6 X10(3)/MCL (ref 2.1–9.2)
BASOPHILS # BLD AUTO: 0 X10(3)/MCL (ref 0–0.2)
BASOPHILS NFR BLD AUTO: 0.5 %
EOSINOPHIL # BLD AUTO: 0.8 X10(3)/MCL (ref 0–0.9)
EOSINOPHIL NFR BLD AUTO: 13 %
ERYTHROCYTE [DISTWIDTH] IN BLOOD BY AUTOMATED COUNT: 14.4 % (ref 11.5–17)
HCT VFR BLD AUTO: 44.1 % (ref 42–52)
HGB BLD-MCNC: 15.7 GM/DL (ref 14–18)
LYMPHOCYTES # BLD AUTO: 2.2 X10(3)/MCL (ref 0.6–4.6)
LYMPHOCYTES NFR BLD AUTO: 34.1 %
MCH RBC QN AUTO: 29.8 PG (ref 27–31)
MCHC RBC AUTO-ENTMCNC: 35.6 GM/DL (ref 33–36)
MCV RBC AUTO: 83.7 FL (ref 80–94)
MONOCYTES # BLD AUTO: 0.8 X10(3)/MCL (ref 0.1–1.3)
MONOCYTES NFR BLD AUTO: 11.9 %
NEUTROPHILS # BLD AUTO: 2.6 X10(3)/MCL (ref 2.1–9.2)
NEUTROPHILS NFR BLD AUTO: 40.5 %
PLATELET # BLD AUTO: 102 X10(3)/MCL (ref 130–400)
PMV BLD AUTO: 10.5 FL (ref 9.4–12.4)
RBC # BLD AUTO: 5.27 X10(6)/MCL (ref 4.7–6.1)
WBC # SPEC AUTO: 6.4 X10(3)/MCL (ref 4.5–11.5)

## 2018-12-03 ENCOUNTER — HISTORICAL (OUTPATIENT)
Dept: HEMATOLOGY/ONCOLOGY | Facility: CLINIC | Age: 63
End: 2018-12-03

## 2018-12-03 LAB
ABS NEUT (OLG): 2.7 X10(3)/MCL (ref 2.1–9.2)
ALBUMIN SERPL-MCNC: 3.7 GM/DL (ref 3.4–5)
ALBUMIN/GLOB SERPL: 1.4 {RATIO}
ALP SERPL-CCNC: 57 UNIT/L (ref 50–136)
ALT SERPL-CCNC: 25 UNIT/L (ref 12–78)
AST SERPL-CCNC: 14 UNIT/L (ref 15–37)
BASOPHILS # BLD AUTO: 0 X10(3)/MCL (ref 0–0.2)
BASOPHILS NFR BLD AUTO: 0.5 %
BILIRUB SERPL-MCNC: 1.5 MG/DL (ref 0.2–1)
BILIRUBIN DIRECT+TOT PNL SERPL-MCNC: 0.2 MG/DL (ref 0–0.2)
BILIRUBIN DIRECT+TOT PNL SERPL-MCNC: 1.3 MG/DL (ref 0–0.8)
BUN SERPL-MCNC: 20 MG/DL (ref 7–18)
CALCIUM SERPL-MCNC: 9 MG/DL (ref 8.5–10.1)
CHLORIDE SERPL-SCNC: 106 MMOL/L (ref 98–107)
CO2 SERPL-SCNC: 26 MMOL/L (ref 21–32)
CREAT SERPL-MCNC: 0.87 MG/DL (ref 0.7–1.3)
EOSINOPHIL # BLD AUTO: 0.3 X10(3)/MCL (ref 0–0.9)
EOSINOPHIL NFR BLD AUTO: 5.7 %
ERYTHROCYTE [DISTWIDTH] IN BLOOD BY AUTOMATED COUNT: 13 % (ref 11.5–17)
GLOBULIN SER-MCNC: 2.7 GM/DL (ref 2.4–3.5)
GLUCOSE SERPL-MCNC: 95 MG/DL (ref 74–106)
HCT VFR BLD AUTO: 43.6 % (ref 42–52)
HGB BLD-MCNC: 15.1 GM/DL (ref 14–18)
LYMPHOCYTES # BLD AUTO: 2.2 X10(3)/MCL (ref 0.6–4.6)
LYMPHOCYTES NFR BLD AUTO: 37.5 %
MCH RBC QN AUTO: 29.5 PG (ref 27–31)
MCHC RBC AUTO-ENTMCNC: 34.6 GM/DL (ref 33–36)
MCV RBC AUTO: 85.3 FL (ref 80–94)
MONOCYTES # BLD AUTO: 0.7 X10(3)/MCL (ref 0.1–1.3)
MONOCYTES NFR BLD AUTO: 11 %
NEUTROPHILS # BLD AUTO: 2.7 X10(3)/MCL (ref 2.1–9.2)
NEUTROPHILS NFR BLD AUTO: 45.1 %
PLATELET # BLD AUTO: 107 X10(3)/MCL (ref 130–400)
PMV BLD AUTO: 10.7 FL (ref 9.4–12.4)
POTASSIUM SERPL-SCNC: 4.4 MMOL/L (ref 3.5–5.1)
PROT SERPL-MCNC: 6.4 GM/DL (ref 6.4–8.2)
RBC # BLD AUTO: 5.11 X10(6)/MCL (ref 4.7–6.1)
SODIUM SERPL-SCNC: 140 MMOL/L (ref 136–145)
WBC # SPEC AUTO: 6 X10(3)/MCL (ref 4.5–11.5)

## 2019-01-07 ENCOUNTER — HISTORICAL (OUTPATIENT)
Dept: ADMINISTRATIVE | Facility: HOSPITAL | Age: 64
End: 2019-01-07

## 2019-01-07 LAB
ABS NEUT (OLG): 4.93 X10(3)/MCL (ref 2.1–9.2)
ALBUMIN SERPL-MCNC: 4.3 GM/DL (ref 3.4–5)
ALBUMIN/GLOB SERPL: 1.5 {RATIO}
ALP SERPL-CCNC: 75 UNIT/L (ref 50–136)
ALT SERPL-CCNC: 29 UNIT/L (ref 12–78)
AST SERPL-CCNC: 15 UNIT/L (ref 15–37)
BASOPHILS # BLD AUTO: 0 X10(3)/MCL (ref 0–0.2)
BASOPHILS NFR BLD AUTO: 0.4 %
BILIRUB SERPL-MCNC: 1.4 MG/DL (ref 0.2–1)
BILIRUBIN DIRECT+TOT PNL SERPL-MCNC: 0.2 MG/DL (ref 0–0.2)
BILIRUBIN DIRECT+TOT PNL SERPL-MCNC: 1.2 MG/DL (ref 0–0.8)
BUN SERPL-MCNC: 27 MG/DL (ref 7–18)
CALCIUM SERPL-MCNC: 9 MG/DL (ref 8.5–10.1)
CHLORIDE SERPL-SCNC: 103 MMOL/L (ref 98–107)
CO2 SERPL-SCNC: 29 MMOL/L (ref 21–32)
CREAT SERPL-MCNC: 0.86 MG/DL (ref 0.7–1.3)
EOSINOPHIL # BLD AUTO: 0.3 X10(3)/MCL (ref 0–0.9)
EOSINOPHIL NFR BLD AUTO: 2.6 %
ERYTHROCYTE [DISTWIDTH] IN BLOOD BY AUTOMATED COUNT: 12.6 % (ref 11.5–17)
GLOBULIN SER-MCNC: 2.8 GM/DL (ref 2.4–3.5)
GLUCOSE SERPL-MCNC: 100 MG/DL (ref 74–106)
HCT VFR BLD AUTO: 48.5 % (ref 42–52)
HGB BLD-MCNC: 16.7 GM/DL (ref 14–18)
LYMPHOCYTES # BLD AUTO: 3.5 X10(3)/MCL (ref 0.6–4.6)
LYMPHOCYTES NFR BLD AUTO: 34.7 %
MCH RBC QN AUTO: 29.3 PG (ref 27–31)
MCHC RBC AUTO-ENTMCNC: 34.4 GM/DL (ref 33–36)
MCV RBC AUTO: 85.1 FL (ref 80–94)
MONOCYTES # BLD AUTO: 1.3 X10(3)/MCL (ref 0.1–1.3)
MONOCYTES NFR BLD AUTO: 13 %
NEUTROPHILS # BLD AUTO: 4.9 X10(3)/MCL (ref 2.1–9.2)
NEUTROPHILS NFR BLD AUTO: 49.1 %
PLATELET # BLD AUTO: 216 X10(3)/MCL (ref 130–400)
PMV BLD AUTO: 10 FL (ref 9.4–12.4)
POTASSIUM SERPL-SCNC: 4.8 MMOL/L (ref 3.5–5.1)
PROT SERPL-MCNC: 7.1 GM/DL (ref 6.4–8.2)
RBC # BLD AUTO: 5.7 X10(6)/MCL (ref 4.7–6.1)
SODIUM SERPL-SCNC: 135 MMOL/L (ref 136–145)
WBC # SPEC AUTO: 10 X10(3)/MCL (ref 4.5–11.5)

## 2019-01-21 ENCOUNTER — HISTORICAL (OUTPATIENT)
Dept: HEMATOLOGY/ONCOLOGY | Facility: CLINIC | Age: 64
End: 2019-01-21

## 2019-01-21 LAB
ABS NEUT (OLG): 2.95 X10(3)/MCL (ref 2.1–9.2)
BASOPHILS # BLD AUTO: 0 X10(3)/MCL (ref 0–0.2)
BASOPHILS NFR BLD AUTO: 0.7 %
EOSINOPHIL # BLD AUTO: 0.3 X10(3)/MCL (ref 0–0.9)
EOSINOPHIL NFR BLD AUTO: 3.9 %
ERYTHROCYTE [DISTWIDTH] IN BLOOD BY AUTOMATED COUNT: 12.7 % (ref 11.5–17)
HCT VFR BLD AUTO: 47.1 % (ref 42–52)
HGB BLD-MCNC: 16.2 GM/DL (ref 14–18)
LYMPHOCYTES # BLD AUTO: 3.2 X10(3)/MCL (ref 0.6–4.6)
LYMPHOCYTES NFR BLD AUTO: 44.1 %
MCH RBC QN AUTO: 29.3 PG (ref 27–31)
MCHC RBC AUTO-ENTMCNC: 34.4 GM/DL (ref 33–36)
MCV RBC AUTO: 85.3 FL (ref 80–94)
MONOCYTES # BLD AUTO: 0.8 X10(3)/MCL (ref 0.1–1.3)
MONOCYTES NFR BLD AUTO: 11 %
NEUTROPHILS # BLD AUTO: 3 X10(3)/MCL (ref 2.1–9.2)
NEUTROPHILS NFR BLD AUTO: 40.2 %
PLATELET # BLD AUTO: 106 X10(3)/MCL (ref 130–400)
PMV BLD AUTO: 10 FL (ref 9.4–12.4)
RBC # BLD AUTO: 5.52 X10(6)/MCL (ref 4.7–6.1)
WBC # SPEC AUTO: 7.4 X10(3)/MCL (ref 4.5–11.5)

## 2019-01-28 ENCOUNTER — HISTORICAL (OUTPATIENT)
Dept: HEMATOLOGY/ONCOLOGY | Facility: CLINIC | Age: 64
End: 2019-01-28

## 2019-01-28 LAB
ABS NEUT (OLG): 1.72 X10(3)/MCL (ref 2.1–9.2)
BASOPHILS # BLD AUTO: 0 X10(3)/MCL (ref 0–0.2)
BASOPHILS NFR BLD AUTO: 0.4 %
EOSINOPHIL # BLD AUTO: 0.2 X10(3)/MCL (ref 0–0.9)
EOSINOPHIL NFR BLD AUTO: 4.8 %
ERYTHROCYTE [DISTWIDTH] IN BLOOD BY AUTOMATED COUNT: 12.7 % (ref 11.5–17)
HCT VFR BLD AUTO: 43.9 % (ref 42–52)
HGB BLD-MCNC: 15.2 GM/DL (ref 14–18)
LYMPHOCYTES # BLD AUTO: 2.1 X10(3)/MCL (ref 0.6–4.6)
LYMPHOCYTES NFR BLD AUTO: 46.5 %
MCH RBC QN AUTO: 29.7 PG (ref 27–31)
MCHC RBC AUTO-ENTMCNC: 34.6 GM/DL (ref 33–36)
MCV RBC AUTO: 85.9 FL (ref 80–94)
MONOCYTES # BLD AUTO: 0.5 X10(3)/MCL (ref 0.1–1.3)
MONOCYTES NFR BLD AUTO: 10.9 %
NEUTROPHILS # BLD AUTO: 1.7 X10(3)/MCL (ref 2.1–9.2)
NEUTROPHILS NFR BLD AUTO: 37.4 %
PLATELET # BLD AUTO: 73 X10(3)/MCL (ref 130–400)
PMV BLD AUTO: 10.4 FL (ref 9.4–12.4)
RBC # BLD AUTO: 5.11 X10(6)/MCL (ref 4.7–6.1)
WBC # SPEC AUTO: 4.6 X10(3)/MCL (ref 4.5–11.5)

## 2019-02-04 ENCOUNTER — HISTORICAL (OUTPATIENT)
Dept: HEMATOLOGY/ONCOLOGY | Facility: CLINIC | Age: 64
End: 2019-02-04

## 2019-02-04 LAB
ABS NEUT (OLG): 1.94 X10(3)/MCL (ref 2.1–9.2)
BASOPHILS # BLD AUTO: 0 X10(3)/MCL (ref 0–0.2)
BASOPHILS NFR BLD AUTO: 0.4 %
EOSINOPHIL # BLD AUTO: 0.3 X10(3)/MCL (ref 0–0.9)
EOSINOPHIL NFR BLD AUTO: 5.9 %
ERYTHROCYTE [DISTWIDTH] IN BLOOD BY AUTOMATED COUNT: 12.9 % (ref 11.5–17)
HCT VFR BLD AUTO: 43.8 % (ref 42–52)
HGB BLD-MCNC: 15.1 GM/DL (ref 14–18)
LYMPHOCYTES # BLD AUTO: 2 X10(3)/MCL (ref 0.6–4.6)
LYMPHOCYTES NFR BLD AUTO: 43.4 %
MCH RBC QN AUTO: 29.6 PG (ref 27–31)
MCHC RBC AUTO-ENTMCNC: 34.5 GM/DL (ref 33–36)
MCV RBC AUTO: 85.9 FL (ref 80–94)
MONOCYTES # BLD AUTO: 0.4 X10(3)/MCL (ref 0.1–1.3)
MONOCYTES NFR BLD AUTO: 8.2 %
NEUTROPHILS # BLD AUTO: 1.9 X10(3)/MCL (ref 2.1–9.2)
NEUTROPHILS NFR BLD AUTO: 42.1 %
PLATELET # BLD AUTO: 87 X10(3)/MCL (ref 130–400)
PMV BLD AUTO: 9.7 FL (ref 9.4–12.4)
RBC # BLD AUTO: 5.1 X10(6)/MCL (ref 4.7–6.1)
WBC # SPEC AUTO: 4.6 X10(3)/MCL (ref 4.5–11.5)

## 2019-02-18 ENCOUNTER — HISTORICAL (OUTPATIENT)
Dept: ADMINISTRATIVE | Facility: HOSPITAL | Age: 64
End: 2019-02-18

## 2019-02-18 LAB
ABS NEUT (OLG): 3.14 X10(3)/MCL (ref 2.1–9.2)
BASOPHILS # BLD AUTO: 0 X10(3)/MCL (ref 0–0.2)
BASOPHILS NFR BLD AUTO: 0.4 %
EOSINOPHIL # BLD AUTO: 0.3 X10(3)/MCL (ref 0–0.9)
EOSINOPHIL NFR BLD AUTO: 4.1 %
ERYTHROCYTE [DISTWIDTH] IN BLOOD BY AUTOMATED COUNT: 12.5 % (ref 11.5–17)
HCT VFR BLD AUTO: 46.6 % (ref 42–52)
HGB BLD-MCNC: 16.4 GM/DL (ref 14–18)
LYMPHOCYTES # BLD AUTO: 3.4 X10(3)/MCL (ref 0.6–4.6)
LYMPHOCYTES NFR BLD AUTO: 43.7 %
MCH RBC QN AUTO: 29.7 PG (ref 27–31)
MCHC RBC AUTO-ENTMCNC: 35.2 GM/DL (ref 33–36)
MCV RBC AUTO: 84.3 FL (ref 80–94)
MONOCYTES # BLD AUTO: 0.9 X10(3)/MCL (ref 0.1–1.3)
MONOCYTES NFR BLD AUTO: 11.3 %
NEUTROPHILS # BLD AUTO: 3.1 X10(3)/MCL (ref 2.1–9.2)
NEUTROPHILS NFR BLD AUTO: 40.4 %
PLATELET # BLD AUTO: 112 X10(3)/MCL (ref 130–400)
PMV BLD AUTO: 10.3 FL (ref 9.4–12.4)
RBC # BLD AUTO: 5.53 X10(6)/MCL (ref 4.7–6.1)
WBC # SPEC AUTO: 7.8 X10(3)/MCL (ref 4.5–11.5)

## 2019-03-12 ENCOUNTER — HISTORICAL (OUTPATIENT)
Dept: HEMATOLOGY/ONCOLOGY | Facility: CLINIC | Age: 64
End: 2019-03-12

## 2019-03-12 LAB
ABS NEUT (OLG): 2.28 X10(3)/MCL (ref 2.1–9.2)
BASOPHILS # BLD AUTO: 0 X10(3)/MCL (ref 0–0.2)
BASOPHILS NFR BLD AUTO: 0.7 %
EOSINOPHIL # BLD AUTO: 0.2 X10(3)/MCL (ref 0–0.9)
EOSINOPHIL NFR BLD AUTO: 4 %
ERYTHROCYTE [DISTWIDTH] IN BLOOD BY AUTOMATED COUNT: 12.7 % (ref 11.5–17)
HCT VFR BLD AUTO: 43.8 % (ref 42–52)
HGB BLD-MCNC: 15.2 GM/DL (ref 14–18)
LYMPHOCYTES # BLD AUTO: 2.7 X10(3)/MCL (ref 0.6–4.6)
LYMPHOCYTES NFR BLD AUTO: 46.9 %
MCH RBC QN AUTO: 29.6 PG (ref 27–31)
MCHC RBC AUTO-ENTMCNC: 34.7 GM/DL (ref 33–36)
MCV RBC AUTO: 85.4 FL (ref 80–94)
MONOCYTES # BLD AUTO: 0.5 X10(3)/MCL (ref 0.1–1.3)
MONOCYTES NFR BLD AUTO: 8.9 %
NEUTROPHILS # BLD AUTO: 2.3 X10(3)/MCL (ref 2.1–9.2)
NEUTROPHILS NFR BLD AUTO: 39.2 %
PLATELET # BLD AUTO: 130 X10(3)/MCL (ref 130–400)
PMV BLD AUTO: 10.1 FL (ref 9.4–12.4)
RBC # BLD AUTO: 5.13 X10(6)/MCL (ref 4.7–6.1)
WBC # SPEC AUTO: 5.8 X10(3)/MCL (ref 4.5–11.5)

## 2019-04-02 ENCOUNTER — HISTORICAL (OUTPATIENT)
Dept: ADMINISTRATIVE | Facility: HOSPITAL | Age: 64
End: 2019-04-02

## 2019-04-02 LAB
ABS NEUT (OLG): 2.85 X10(3)/MCL (ref 2.1–9.2)
ALBUMIN SERPL-MCNC: 3.9 GM/DL (ref 3.4–5)
ALBUMIN/GLOB SERPL: 1.6 {RATIO}
ALP SERPL-CCNC: 68 UNIT/L (ref 50–136)
ALT SERPL-CCNC: 35 UNIT/L (ref 12–78)
AST SERPL-CCNC: 17 UNIT/L (ref 15–37)
BASOPHILS # BLD AUTO: 0 X10(3)/MCL (ref 0–0.2)
BASOPHILS NFR BLD AUTO: 0.6 %
BILIRUB SERPL-MCNC: 1.2 MG/DL (ref 0.2–1)
BILIRUBIN DIRECT+TOT PNL SERPL-MCNC: 0.2 MG/DL (ref 0–0.2)
BILIRUBIN DIRECT+TOT PNL SERPL-MCNC: 1 MG/DL (ref 0–0.8)
BUN SERPL-MCNC: 14 MG/DL (ref 7–18)
CALCIUM SERPL-MCNC: 8.6 MG/DL (ref 8.5–10.1)
CHLORIDE SERPL-SCNC: 105 MMOL/L (ref 98–107)
CO2 SERPL-SCNC: 28 MMOL/L (ref 21–32)
CREAT SERPL-MCNC: 0.89 MG/DL (ref 0.7–1.3)
EOSINOPHIL # BLD AUTO: 0.3 X10(3)/MCL (ref 0–0.9)
EOSINOPHIL NFR BLD AUTO: 4.4 %
ERYTHROCYTE [DISTWIDTH] IN BLOOD BY AUTOMATED COUNT: 13.3 % (ref 11.5–17)
GLOBULIN SER-MCNC: 2.5 GM/DL (ref 2.4–3.5)
GLUCOSE SERPL-MCNC: 97 MG/DL (ref 74–106)
HCT VFR BLD AUTO: 45.4 % (ref 42–52)
HGB BLD-MCNC: 15.8 GM/DL (ref 14–18)
LYMPHOCYTES # BLD AUTO: 3.8 X10(3)/MCL (ref 0.6–4.6)
LYMPHOCYTES NFR BLD AUTO: 49.2 %
MCH RBC QN AUTO: 29.9 PG (ref 27–31)
MCHC RBC AUTO-ENTMCNC: 34.8 GM/DL (ref 33–36)
MCV RBC AUTO: 85.8 FL (ref 80–94)
MONOCYTES # BLD AUTO: 0.7 X10(3)/MCL (ref 0.1–1.3)
MONOCYTES NFR BLD AUTO: 9 %
NEUTROPHILS # BLD AUTO: 2.8 X10(3)/MCL (ref 2.1–9.2)
NEUTROPHILS NFR BLD AUTO: 36.7 %
PLATELET # BLD AUTO: 143 X10(3)/MCL (ref 130–400)
PMV BLD AUTO: 10.3 FL (ref 9.4–12.4)
POTASSIUM SERPL-SCNC: 4.4 MMOL/L (ref 3.5–5.1)
PROT SERPL-MCNC: 6.4 GM/DL (ref 6.4–8.2)
RBC # BLD AUTO: 5.29 X10(6)/MCL (ref 4.7–6.1)
SODIUM SERPL-SCNC: 138 MMOL/L (ref 136–145)
WBC # SPEC AUTO: 7.8 X10(3)/MCL (ref 4.5–11.5)

## 2019-04-30 ENCOUNTER — HISTORICAL (OUTPATIENT)
Dept: HEMATOLOGY/ONCOLOGY | Facility: CLINIC | Age: 64
End: 2019-04-30

## 2019-04-30 LAB
ABS NEUT (OLG): 3.04 X10(3)/MCL (ref 2.1–9.2)
ALBUMIN SERPL-MCNC: 4.1 GM/DL (ref 3.4–5)
ALBUMIN/GLOB SERPL: 1.5 RATIO (ref 1.1–2)
ALP SERPL-CCNC: 62 UNIT/L (ref 50–136)
ALT SERPL-CCNC: 36 UNIT/L (ref 12–78)
AST SERPL-CCNC: 19 UNIT/L (ref 15–37)
BASOPHILS # BLD AUTO: 0 X10(3)/MCL (ref 0–0.2)
BASOPHILS NFR BLD AUTO: 0.5 %
BILIRUB SERPL-MCNC: 1.7 MG/DL (ref 0.2–1)
BILIRUBIN DIRECT+TOT PNL SERPL-MCNC: 0.3 MG/DL (ref 0–0.5)
BILIRUBIN DIRECT+TOT PNL SERPL-MCNC: 1.4 MG/DL (ref 0–0.8)
BUN SERPL-MCNC: 27 MG/DL (ref 7–18)
CALCIUM SERPL-MCNC: 9 MG/DL (ref 8.5–10.1)
CHLORIDE SERPL-SCNC: 107 MMOL/L (ref 98–107)
CO2 SERPL-SCNC: 29 MMOL/L (ref 21–32)
CREAT SERPL-MCNC: 0.89 MG/DL (ref 0.7–1.3)
EOSINOPHIL # BLD AUTO: 0.3 X10(3)/MCL (ref 0–0.9)
EOSINOPHIL NFR BLD AUTO: 3.6 %
ERYTHROCYTE [DISTWIDTH] IN BLOOD BY AUTOMATED COUNT: 12.7 % (ref 11.5–17)
GLOBULIN SER-MCNC: 2.7 GM/DL (ref 2.4–3.5)
GLUCOSE SERPL-MCNC: 85 MG/DL (ref 74–106)
HCT VFR BLD AUTO: 46.4 % (ref 42–52)
HGB BLD-MCNC: 16.4 GM/DL (ref 14–18)
LYMPHOCYTES # BLD AUTO: 3.6 X10(3)/MCL (ref 0.6–4.6)
LYMPHOCYTES NFR BLD AUTO: 46.4 %
MCH RBC QN AUTO: 30 PG (ref 27–31)
MCHC RBC AUTO-ENTMCNC: 35.3 GM/DL (ref 33–36)
MCV RBC AUTO: 84.8 FL (ref 80–94)
MONOCYTES # BLD AUTO: 0.8 X10(3)/MCL (ref 0.1–1.3)
MONOCYTES NFR BLD AUTO: 10.7 %
NEUTROPHILS # BLD AUTO: 3 X10(3)/MCL (ref 2.1–9.2)
NEUTROPHILS NFR BLD AUTO: 38.7 %
PLATELET # BLD AUTO: 140 X10(3)/MCL (ref 130–400)
PMV BLD AUTO: 10.3 FL (ref 9.4–12.4)
POTASSIUM SERPL-SCNC: 4.3 MMOL/L (ref 3.5–5.1)
PROT SERPL-MCNC: 6.8 GM/DL (ref 6.4–8.2)
RBC # BLD AUTO: 5.47 X10(6)/MCL (ref 4.7–6.1)
SODIUM SERPL-SCNC: 139 MMOL/L (ref 136–145)
WBC # SPEC AUTO: 7.8 X10(3)/MCL (ref 4.5–11.5)

## 2019-05-16 ENCOUNTER — HISTORICAL (OUTPATIENT)
Dept: ADMINISTRATIVE | Facility: HOSPITAL | Age: 64
End: 2019-05-16

## 2019-05-16 LAB
ABS NEUT (OLG): 2.5 X10(3)/MCL (ref 2.1–9.2)
ALBUMIN SERPL-MCNC: 3.9 GM/DL (ref 3.4–5)
ALBUMIN/GLOB SERPL: 1.5 RATIO (ref 1.1–2)
ALP SERPL-CCNC: 58 UNIT/L (ref 50–136)
ALT SERPL-CCNC: 31 UNIT/L (ref 12–78)
AST SERPL-CCNC: 14 UNIT/L (ref 15–37)
BILIRUB SERPL-MCNC: 1.4 MG/DL (ref 0.2–1)
BILIRUBIN DIRECT+TOT PNL SERPL-MCNC: 0.3 MG/DL (ref 0–0.5)
BILIRUBIN DIRECT+TOT PNL SERPL-MCNC: 1.1 MG/DL (ref 0–0.8)
BUN SERPL-MCNC: 24 MG/DL (ref 7–18)
CALCIUM SERPL-MCNC: 8.9 MG/DL (ref 8.5–10.1)
CHLORIDE SERPL-SCNC: 110 MMOL/L (ref 98–107)
CHOLEST SERPL-MCNC: 138 MG/DL (ref 0–200)
CHOLEST/HDLC SERPL: 4.1 {RATIO} (ref 0–5)
CO2 SERPL-SCNC: 30 MMOL/L (ref 21–32)
CREAT SERPL-MCNC: 0.84 MG/DL (ref 0.7–1.3)
EOSINOPHIL NFR BLD MANUAL: 3 % (ref 0–8)
ERYTHROCYTE [DISTWIDTH] IN BLOOD BY AUTOMATED COUNT: 13 % (ref 11.5–17)
GLOBULIN SER-MCNC: 2.6 GM/DL (ref 2.4–3.5)
GLUCOSE SERPL-MCNC: 89 MG/DL (ref 74–106)
HCT VFR BLD AUTO: 44.5 % (ref 42–52)
HDLC SERPL-MCNC: 34 MG/DL (ref 35–60)
HGB BLD-MCNC: 15.5 GM/DL (ref 14–18)
LDLC SERPL CALC-MCNC: 83 MG/DL (ref 0–129)
LYMPHOCYTES NFR BLD MANUAL: 46 % (ref 13–40)
MCH RBC QN AUTO: 29.9 PG (ref 27–31)
MCHC RBC AUTO-ENTMCNC: 34.8 GM/DL (ref 33–36)
MCV RBC AUTO: 85.9 FL (ref 80–94)
MICROCYTES BLD QL SMEAR: 1
MONOCYTES NFR BLD MANUAL: 7 % (ref 2–11)
NEUTROPHILS NFR BLD MANUAL: 44 % (ref 47–80)
PLATELET # BLD AUTO: 127 X10(3)/MCL (ref 130–400)
PLATELET # BLD EST: ABNORMAL 10*3/UL
PMV BLD AUTO: 10.6 FL (ref 7.4–10.4)
POTASSIUM SERPL-SCNC: 4.2 MMOL/L (ref 3.5–5.1)
PROT SERPL-MCNC: 6.5 GM/DL (ref 6.4–8.2)
PSA SERPL-MCNC: 0.66 NG/ML (ref 0–4)
RBC # BLD AUTO: 5.18 X10(6)/MCL (ref 4.7–6.1)
SODIUM SERPL-SCNC: 143 MMOL/L (ref 136–145)
TRIGL SERPL-MCNC: 104 MG/DL (ref 30–150)
VLDLC SERPL CALC-MCNC: 21 MG/DL
WBC # SPEC AUTO: 7.4 X10(3)/MCL (ref 4.5–11.5)

## 2019-05-28 ENCOUNTER — HISTORICAL (OUTPATIENT)
Dept: ADMINISTRATIVE | Facility: HOSPITAL | Age: 64
End: 2019-05-28

## 2019-05-28 LAB
ABS NEUT (OLG): 3.29 X10(3)/MCL (ref 2.1–9.2)
ALBUMIN SERPL-MCNC: 4.3 GM/DL (ref 3.4–5)
ALBUMIN/GLOB SERPL: 1.5 RATIO (ref 1.1–2)
ALP SERPL-CCNC: 64 UNIT/L (ref 50–136)
ALT SERPL-CCNC: 29 UNIT/L (ref 12–78)
AST SERPL-CCNC: 14 UNIT/L (ref 15–37)
BASOPHILS # BLD AUTO: 0 X10(3)/MCL (ref 0–0.2)
BASOPHILS NFR BLD AUTO: 0.5 %
BILIRUB SERPL-MCNC: 1.3 MG/DL (ref 0.2–1)
BILIRUBIN DIRECT+TOT PNL SERPL-MCNC: 0.2 MG/DL (ref 0–0.5)
BILIRUBIN DIRECT+TOT PNL SERPL-MCNC: 1.1 MG/DL (ref 0–0.8)
BUN SERPL-MCNC: 24 MG/DL (ref 7–18)
CALCIUM SERPL-MCNC: 9.2 MG/DL (ref 8.5–10.1)
CHLORIDE SERPL-SCNC: 108 MMOL/L (ref 98–107)
CO2 SERPL-SCNC: 25 MMOL/L (ref 21–32)
CREAT SERPL-MCNC: 0.8 MG/DL (ref 0.7–1.3)
EOSINOPHIL # BLD AUTO: 0.5 X10(3)/MCL (ref 0–0.9)
EOSINOPHIL NFR BLD AUTO: 4.8 %
ERYTHROCYTE [DISTWIDTH] IN BLOOD BY AUTOMATED COUNT: 12.8 % (ref 11.5–17)
GLOBULIN SER-MCNC: 2.9 GM/DL (ref 2.4–3.5)
GLUCOSE SERPL-MCNC: 105 MG/DL (ref 74–106)
HCT VFR BLD AUTO: 46.3 % (ref 42–52)
HGB BLD-MCNC: 16.2 GM/DL (ref 14–18)
LYMPHOCYTES # BLD AUTO: 5.1 X10(3)/MCL (ref 0.6–4.6)
LYMPHOCYTES NFR BLD AUTO: 52.4 %
MCH RBC QN AUTO: 29.9 PG (ref 27–31)
MCHC RBC AUTO-ENTMCNC: 35 GM/DL (ref 33–36)
MCV RBC AUTO: 85.6 FL (ref 80–94)
MONOCYTES # BLD AUTO: 0.8 X10(3)/MCL (ref 0.1–1.3)
MONOCYTES NFR BLD AUTO: 8.2 %
NEUTROPHILS # BLD AUTO: 3.3 X10(3)/MCL (ref 2.1–9.2)
NEUTROPHILS NFR BLD AUTO: 33.9 %
PLATELET # BLD AUTO: 156 X10(3)/MCL (ref 130–400)
PMV BLD AUTO: 10.2 FL (ref 9.4–12.4)
POTASSIUM SERPL-SCNC: 4.4 MMOL/L (ref 3.5–5.1)
PROT SERPL-MCNC: 7.2 GM/DL (ref 6.4–8.2)
RBC # BLD AUTO: 5.41 X10(6)/MCL (ref 4.7–6.1)
SODIUM SERPL-SCNC: 139 MMOL/L (ref 136–145)
WBC # SPEC AUTO: 9.7 X10(3)/MCL (ref 4.5–11.5)

## 2019-07-09 ENCOUNTER — HISTORICAL (OUTPATIENT)
Dept: ADMINISTRATIVE | Facility: HOSPITAL | Age: 64
End: 2019-07-09

## 2019-07-09 LAB
ABS NEUT (OLG): 3.06 X10(3)/MCL (ref 2.1–9.2)
BASOPHILS # BLD AUTO: 0 X10(3)/MCL (ref 0–0.2)
BASOPHILS NFR BLD AUTO: 0.3 %
EOSINOPHIL # BLD AUTO: 0.4 X10(3)/MCL (ref 0–0.9)
EOSINOPHIL NFR BLD AUTO: 4.4 %
EOSINOPHIL NFR BLD MANUAL: 3 % (ref 0–8)
ERYTHROCYTE [DISTWIDTH] IN BLOOD BY AUTOMATED COUNT: 12.8 % (ref 11.5–17)
HCT VFR BLD AUTO: 46.5 % (ref 42–52)
HGB BLD-MCNC: 16.2 GM/DL (ref 14–18)
LYMPHOCYTES # BLD AUTO: 4.8 X10(3)/MCL (ref 0.6–4.6)
LYMPHOCYTES NFR BLD AUTO: 52.5 %
LYMPHOCYTES NFR BLD MANUAL: 5 /MCL
LYMPHOCYTES NFR BLD MANUAL: 56 % (ref 13–40)
MCH RBC QN AUTO: 30.1 PG (ref 27–31)
MCHC RBC AUTO-ENTMCNC: 34.8 GM/DL (ref 33–36)
MCV RBC AUTO: 86.4 FL (ref 80–94)
MONOCYTES # BLD AUTO: 0.8 X10(3)/MCL (ref 0.1–1.3)
MONOCYTES NFR BLD AUTO: 9.2 %
MONOCYTES NFR BLD MANUAL: 9 % (ref 2–11)
NEUTROPHILS # BLD AUTO: 3.1 X10(3)/MCL (ref 2.1–9.2)
NEUTROPHILS NFR BLD AUTO: 33.5 %
NEUTROPHILS NFR BLD MANUAL: 32 % (ref 47–80)
PLATELET # BLD AUTO: 151 X10(3)/MCL (ref 130–400)
PLATELET # BLD EST: NORMAL 10*3/UL
PMV BLD AUTO: 10.3 FL (ref 9.4–12.4)
RBC # BLD AUTO: 5.38 X10(6)/MCL (ref 4.7–6.1)
RBC MORPH BLD: NORMAL
WBC # SPEC AUTO: 9.1 X10(3)/MCL (ref 4.5–11.5)

## 2019-08-20 ENCOUNTER — HISTORICAL (OUTPATIENT)
Dept: ADMINISTRATIVE | Facility: HOSPITAL | Age: 64
End: 2019-08-20

## 2019-08-20 LAB
ABS NEUT (OLG): 3.6 X10(3)/MCL (ref 2.1–9.2)
ALBUMIN SERPL-MCNC: 4.5 GM/DL (ref 3.4–5)
ALBUMIN/GLOB SERPL: 1.8 RATIO (ref 1.1–2)
ALP SERPL-CCNC: 68 UNIT/L (ref 50–136)
ALT SERPL-CCNC: 33 UNIT/L (ref 12–78)
ANISOCYTOSIS BLD QL SMEAR: 0
AST SERPL-CCNC: 17 UNIT/L (ref 15–37)
BASOPHILS # BLD AUTO: 0 X10(3)/MCL (ref 0–0.2)
BASOPHILS NFR BLD AUTO: 0.3 %
BASOPHILS NFR BLD MANUAL: 1 % (ref 0–2)
BILIRUB SERPL-MCNC: 1.4 MG/DL (ref 0.2–1)
BILIRUBIN DIRECT+TOT PNL SERPL-MCNC: 0.3 MG/DL (ref 0–0.5)
BILIRUBIN DIRECT+TOT PNL SERPL-MCNC: 1.1 MG/DL (ref 0–0.8)
BUN SERPL-MCNC: 20 MG/DL (ref 7–18)
CALCIUM SERPL-MCNC: 9.4 MG/DL (ref 8.5–10.1)
CHLORIDE SERPL-SCNC: 107 MMOL/L (ref 98–107)
CO2 SERPL-SCNC: 28 MMOL/L (ref 21–32)
CREAT SERPL-MCNC: 0.89 MG/DL (ref 0.7–1.3)
EOSINOPHIL # BLD AUTO: 0.4 X10(3)/MCL (ref 0–0.9)
EOSINOPHIL NFR BLD AUTO: 3.5 %
EOSINOPHIL NFR BLD MANUAL: 3 % (ref 0–8)
ERYTHROCYTE [DISTWIDTH] IN BLOOD BY AUTOMATED COUNT: 12.6 % (ref 11.5–17)
GLOBULIN SER-MCNC: 2.5 GM/DL (ref 2.4–3.5)
GLUCOSE SERPL-MCNC: 95 MG/DL (ref 74–106)
HCT VFR BLD AUTO: 47.4 % (ref 42–52)
HGB BLD-MCNC: 16.4 GM/DL (ref 14–18)
HYPOCHROMIA BLD QL SMEAR: 0
LYMPHOCYTES # BLD AUTO: 6.1 X10(3)/MCL (ref 0.6–4.6)
LYMPHOCYTES NFR BLD AUTO: 53.2 %
LYMPHOCYTES NFR BLD MANUAL: 48 % (ref 13–40)
MACROCYTES BLD QL SMEAR: 0
MCH RBC QN AUTO: 30.1 PG (ref 27–31)
MCHC RBC AUTO-ENTMCNC: 34.6 GM/DL (ref 33–36)
MCV RBC AUTO: 87 FL (ref 80–94)
MICROCYTES BLD QL SMEAR: 0
MONOCYTES # BLD AUTO: 1.3 X10(3)/MCL (ref 0.1–1.3)
MONOCYTES NFR BLD AUTO: 11.4 %
MONOCYTES NFR BLD MANUAL: 6 % (ref 2–11)
NEUTROPHILS # BLD AUTO: 3.6 X10(3)/MCL (ref 2.1–9.2)
NEUTROPHILS NFR BLD AUTO: 31.5 %
NEUTROPHILS NFR BLD MANUAL: 43 % (ref 47–80)
PLATELET # BLD AUTO: 155 X10(3)/MCL (ref 130–400)
PLATELET # BLD EST: NORMAL 10*3/UL
PMV BLD AUTO: 10.4 FL (ref 9.4–12.4)
POIKILOCYTOSIS BLD QL SMEAR: 0
POLYCHROMASIA BLD QL SMEAR: 0
POTASSIUM SERPL-SCNC: 4.3 MMOL/L (ref 3.5–5.1)
PROT SERPL-MCNC: 7 GM/DL (ref 6.4–8.2)
RBC # BLD AUTO: 5.45 X10(6)/MCL (ref 4.7–6.1)
RBC MORPH BLD: NORMAL
SCHISTOCYTES BLD QL AUTO: 0
SODIUM SERPL-SCNC: 141 MMOL/L (ref 136–145)
SPHEROCYTES BLD QL SMEAR: 0
TARGETS BLD QL SMEAR: 0
WBC # SPEC AUTO: 11.4 X10(3)/MCL (ref 4.5–11.5)

## 2019-12-03 ENCOUNTER — HISTORICAL (OUTPATIENT)
Dept: ADMINISTRATIVE | Facility: HOSPITAL | Age: 64
End: 2019-12-03

## 2019-12-03 LAB
ABS NEUT (OLG): 4.26 X10(3)/MCL (ref 2.1–9.2)
ALBUMIN SERPL-MCNC: 4.2 GM/DL (ref 3.4–5)
ALBUMIN/GLOB SERPL: 1.5 RATIO (ref 1.1–2)
ALP SERPL-CCNC: 59 UNIT/L (ref 50–136)
ALT SERPL-CCNC: 40 UNIT/L (ref 12–78)
AST SERPL-CCNC: 17 UNIT/L (ref 15–37)
BASOPHILS # BLD AUTO: 0 X10(3)/MCL (ref 0–0.2)
BASOPHILS NFR BLD AUTO: 0.3 %
BILIRUB SERPL-MCNC: 1.6 MG/DL (ref 0.2–1)
BILIRUBIN DIRECT+TOT PNL SERPL-MCNC: 0.3 MG/DL (ref 0–0.5)
BILIRUBIN DIRECT+TOT PNL SERPL-MCNC: 1.3 MG/DL (ref 0–0.8)
BUN SERPL-MCNC: 19 MG/DL (ref 7–18)
CALCIUM SERPL-MCNC: 9.1 MG/DL (ref 8.5–10.1)
CHLORIDE SERPL-SCNC: 107 MMOL/L (ref 98–107)
CO2 SERPL-SCNC: 28 MMOL/L (ref 21–32)
CREAT SERPL-MCNC: 0.88 MG/DL (ref 0.7–1.3)
EOSINOPHIL # BLD AUTO: 0.3 X10(3)/MCL (ref 0–0.9)
EOSINOPHIL NFR BLD AUTO: 1.8 %
ERYTHROCYTE [DISTWIDTH] IN BLOOD BY AUTOMATED COUNT: 12.9 % (ref 11.5–17)
GLOBULIN SER-MCNC: 2.8 GM/DL (ref 2.4–3.5)
GLUCOSE SERPL-MCNC: 86 MG/DL (ref 74–106)
HCT VFR BLD AUTO: 47.2 % (ref 42–52)
HGB BLD-MCNC: 16.4 GM/DL (ref 14–18)
LYMPHOCYTES # BLD AUTO: 9.2 X10(3)/MCL (ref 0.6–4.6)
LYMPHOCYTES NFR BLD AUTO: 59.2 %
MCH RBC QN AUTO: 30.3 PG (ref 27–31)
MCHC RBC AUTO-ENTMCNC: 34.7 GM/DL (ref 33–36)
MCV RBC AUTO: 87.2 FL (ref 80–94)
MONOCYTES # BLD AUTO: 1.7 X10(3)/MCL (ref 0.1–1.3)
MONOCYTES NFR BLD AUTO: 11 %
NEUTROPHILS # BLD AUTO: 4.3 X10(3)/MCL (ref 2.1–9.2)
NEUTROPHILS NFR BLD AUTO: 27.6 %
PLATELET # BLD AUTO: 134 X10(3)/MCL (ref 130–400)
PMV BLD AUTO: 10.1 FL (ref 9.4–12.4)
POTASSIUM SERPL-SCNC: 4.5 MMOL/L (ref 3.5–5.1)
PROT SERPL-MCNC: 7 GM/DL (ref 6.4–8.2)
RBC # BLD AUTO: 5.41 X10(6)/MCL (ref 4.7–6.1)
SODIUM SERPL-SCNC: 142 MMOL/L (ref 136–145)
WBC # SPEC AUTO: 15.5 X10(3)/MCL (ref 4.5–11.5)

## 2020-03-03 ENCOUNTER — HISTORICAL (OUTPATIENT)
Dept: ADMINISTRATIVE | Facility: HOSPITAL | Age: 65
End: 2020-03-03

## 2020-03-03 LAB
ABS NEUT (OLG): 3.77 X10(3)/MCL (ref 2.1–9.2)
ALBUMIN SERPL-MCNC: 4.4 GM/DL (ref 3.4–5)
ALBUMIN/GLOB SERPL: 1.8 RATIO (ref 1.1–2)
ALP SERPL-CCNC: 59 UNIT/L (ref 50–136)
ALT SERPL-CCNC: 36 UNIT/L (ref 12–78)
AST SERPL-CCNC: 16 UNIT/L (ref 15–37)
BASOPHILS # BLD AUTO: 0 X10(3)/MCL (ref 0–0.2)
BASOPHILS NFR BLD AUTO: 0.2 %
BILIRUB SERPL-MCNC: 1.5 MG/DL (ref 0.2–1)
BILIRUBIN DIRECT+TOT PNL SERPL-MCNC: 0.3 MG/DL (ref 0–0.5)
BILIRUBIN DIRECT+TOT PNL SERPL-MCNC: 1.2 MG/DL (ref 0–0.8)
BUN SERPL-MCNC: 26 MG/DL (ref 7–18)
CALCIUM SERPL-MCNC: 9.1 MG/DL (ref 8.5–10.1)
CHLORIDE SERPL-SCNC: 105 MMOL/L (ref 98–107)
CO2 SERPL-SCNC: 30 MMOL/L (ref 21–32)
CREAT SERPL-MCNC: 0.94 MG/DL (ref 0.7–1.3)
EOSINOPHIL # BLD AUTO: 0.3 X10(3)/MCL (ref 0–0.9)
EOSINOPHIL NFR BLD AUTO: 1.8 %
EOSINOPHIL NFR BLD MANUAL: 2 % (ref 0–8)
ERYTHROCYTE [DISTWIDTH] IN BLOOD BY AUTOMATED COUNT: 12.8 % (ref 11.5–17)
GLOBULIN SER-MCNC: 2.5 GM/DL (ref 2.4–3.5)
GLUCOSE SERPL-MCNC: 97 MG/DL (ref 74–106)
HCT VFR BLD AUTO: 47.9 % (ref 42–52)
HGB BLD-MCNC: 16.7 GM/DL (ref 14–18)
LDH SERPL-CCNC: 117 UNIT/L (ref 87–241)
LYMPHOCYTES # BLD AUTO: 12.5 X10(3)/MCL (ref 0.6–4.6)
LYMPHOCYTES NFR BLD AUTO: 71.3 %
LYMPHOCYTES NFR BLD MANUAL: 73 % (ref 13–40)
MCH RBC QN AUTO: 30.5 PG (ref 27–31)
MCHC RBC AUTO-ENTMCNC: 34.9 GM/DL (ref 33–36)
MCV RBC AUTO: 87.4 FL (ref 80–94)
MONOCYTES # BLD AUTO: 0.9 X10(3)/MCL (ref 0.1–1.3)
MONOCYTES NFR BLD AUTO: 5.1 %
MONOCYTES NFR BLD MANUAL: 5 % (ref 2–11)
NEUTROPHILS # BLD AUTO: 3.8 X10(3)/MCL (ref 2.1–9.2)
NEUTROPHILS NFR BLD AUTO: 21.5 %
NEUTROPHILS NFR BLD MANUAL: 20 % (ref 47–80)
PLATELET # BLD AUTO: 135 X10(3)/MCL (ref 130–400)
PLATELET # BLD EST: NORMAL 10*3/UL
PMV BLD AUTO: 10.3 FL (ref 9.4–12.4)
POTASSIUM SERPL-SCNC: 4.5 MMOL/L (ref 3.5–5.1)
PROT SERPL-MCNC: 6.9 GM/DL (ref 6.4–8.2)
RBC # BLD AUTO: 5.48 X10(6)/MCL (ref 4.7–6.1)
RBC MORPH BLD: NORMAL
SODIUM SERPL-SCNC: 140 MMOL/L (ref 136–145)
WBC # SPEC AUTO: 17.5 X10(3)/MCL (ref 4.5–11.5)

## 2020-05-20 ENCOUNTER — HISTORICAL (OUTPATIENT)
Dept: HEMATOLOGY/ONCOLOGY | Facility: CLINIC | Age: 65
End: 2020-05-20

## 2020-05-20 LAB
ABS NEUT (OLG): 3.89 X10(3)/MCL (ref 2.1–9.2)
ALBUMIN SERPL-MCNC: 4.8 GM/DL (ref 3.4–4.8)
ALBUMIN/GLOB SERPL: 2.2 RATIO (ref 1.1–2)
ALP SERPL-CCNC: 70 UNIT/L (ref 40–150)
ALT SERPL-CCNC: 43 UNIT/L (ref 0–55)
AST SERPL-CCNC: 23 UNIT/L (ref 5–34)
BASOPHILS # BLD AUTO: 0 X10(3)/MCL (ref 0–0.2)
BASOPHILS NFR BLD AUTO: 0.3 %
BILIRUB SERPL-MCNC: 1.2 MG/DL
BILIRUBIN DIRECT+TOT PNL SERPL-MCNC: 0.4 MG/DL (ref 0–0.5)
BILIRUBIN DIRECT+TOT PNL SERPL-MCNC: 0.8 MG/DL (ref 0–0.8)
BUN SERPL-MCNC: 29.8 MG/DL (ref 8.4–25.7)
CALCIUM SERPL-MCNC: 9.7 MG/DL (ref 8.8–10)
CHLORIDE SERPL-SCNC: 106 MMOL/L (ref 98–107)
CO2 SERPL-SCNC: 28 MMOL/L (ref 23–31)
CREAT SERPL-MCNC: 1 MG/DL (ref 0.73–1.18)
EOSINOPHIL # BLD AUTO: 0.4 X10(3)/MCL (ref 0–0.9)
EOSINOPHIL NFR BLD AUTO: 2.3 %
ERYTHROCYTE [DISTWIDTH] IN BLOOD BY AUTOMATED COUNT: 12.7 % (ref 11.5–17)
GLOBULIN SER-MCNC: 2.2 GM/DL (ref 2.4–3.5)
GLUCOSE SERPL-MCNC: 104 MG/DL (ref 82–115)
HCT VFR BLD AUTO: 49.7 % (ref 42–52)
HGB BLD-MCNC: 17.1 GM/DL (ref 14–18)
LDH SERPL-CCNC: 150 UNIT/L (ref 140–271)
LYMPHOCYTES # BLD AUTO: 14 X10(3)/MCL (ref 0.6–4.6)
LYMPHOCYTES NFR BLD AUTO: 72 %
MCH RBC QN AUTO: 29.9 PG (ref 27–31)
MCHC RBC AUTO-ENTMCNC: 34.4 GM/DL (ref 33–36)
MCV RBC AUTO: 87 FL (ref 80–94)
MONOCYTES # BLD AUTO: 1 X10(3)/MCL (ref 0.1–1.3)
MONOCYTES NFR BLD AUTO: 5.4 %
NEUTROPHILS # BLD AUTO: 3.9 X10(3)/MCL (ref 2.1–9.2)
NEUTROPHILS NFR BLD AUTO: 19.9 %
PLATELET # BLD AUTO: 152 X10(3)/MCL (ref 130–400)
PMV BLD AUTO: 10.2 FL (ref 9.4–12.4)
POTASSIUM SERPL-SCNC: 4.7 MMOL/L (ref 3.5–5.1)
PROT SERPL-MCNC: 7 GM/DL (ref 5.8–7.6)
RBC # BLD AUTO: 5.71 X10(6)/MCL (ref 4.7–6.1)
SODIUM SERPL-SCNC: 141 MMOL/L (ref 136–145)
WBC # SPEC AUTO: 19.5 X10(3)/MCL (ref 4.5–11.5)

## 2020-08-21 ENCOUNTER — HISTORICAL (OUTPATIENT)
Dept: HEMATOLOGY/ONCOLOGY | Facility: CLINIC | Age: 65
End: 2020-08-21

## 2020-08-21 LAB
ABS NEUT (OLG): 3.31 X10(3)/MCL (ref 2.1–9.2)
ALBUMIN SERPL-MCNC: 4.1 GM/DL (ref 3.4–4.8)
ALBUMIN/GLOB SERPL: 2.3 RATIO (ref 1.1–2)
ALP SERPL-CCNC: 68 UNIT/L (ref 40–150)
ALT SERPL-CCNC: 27 UNIT/L (ref 0–55)
AST SERPL-CCNC: 19 UNIT/L (ref 5–34)
BASOPHILS # BLD AUTO: 0 X10(3)/MCL (ref 0–0.2)
BASOPHILS NFR BLD AUTO: 0.2 %
BILIRUB SERPL-MCNC: 1.1 MG/DL
BILIRUBIN DIRECT+TOT PNL SERPL-MCNC: 0.4 MG/DL (ref 0–0.5)
BILIRUBIN DIRECT+TOT PNL SERPL-MCNC: 0.7 MG/DL (ref 0–0.8)
BUN SERPL-MCNC: 23.5 MG/DL (ref 8.4–25.7)
CALCIUM SERPL-MCNC: 8 MG/DL (ref 8.8–10)
CHLORIDE SERPL-SCNC: 106 MMOL/L (ref 98–107)
CO2 SERPL-SCNC: 29 MMOL/L (ref 23–31)
CREAT SERPL-MCNC: 0.88 MG/DL (ref 0.73–1.18)
EOSINOPHIL # BLD AUTO: 0.3 X10(3)/MCL (ref 0–0.9)
EOSINOPHIL NFR BLD AUTO: 1.5 %
ERYTHROCYTE [DISTWIDTH] IN BLOOD BY AUTOMATED COUNT: 13.4 % (ref 11.5–17)
GLOBULIN SER-MCNC: 1.8 GM/DL (ref 2.4–3.5)
GLUCOSE SERPL-MCNC: 106 MG/DL (ref 82–115)
HCT VFR BLD AUTO: 43.9 % (ref 42–52)
HGB BLD-MCNC: 15.5 GM/DL (ref 14–18)
LDH SERPL-CCNC: 124 UNIT/L (ref 140–271)
LYMPHOCYTES # BLD AUTO: 14 X10(3)/MCL (ref 0.6–4.6)
LYMPHOCYTES NFR BLD AUTO: 73.4 %
MCH RBC QN AUTO: 31.1 PG (ref 27–31)
MCHC RBC AUTO-ENTMCNC: 35.3 GM/DL (ref 33–36)
MCV RBC AUTO: 88.2 FL (ref 80–94)
MONOCYTES # BLD AUTO: 1.4 X10(3)/MCL (ref 0.1–1.3)
MONOCYTES NFR BLD AUTO: 7.5 %
NEUTROPHILS # BLD AUTO: 3.3 X10(3)/MCL (ref 2.1–9.2)
NEUTROPHILS NFR BLD AUTO: 17.3 %
PLATELET # BLD AUTO: 136 X10(3)/MCL (ref 130–400)
PMV BLD AUTO: 10.5 FL (ref 9.4–12.4)
POTASSIUM SERPL-SCNC: 4.4 MMOL/L (ref 3.5–5.1)
PROT SERPL-MCNC: 5.9 GM/DL (ref 5.8–7.6)
RBC # BLD AUTO: 4.98 X10(6)/MCL (ref 4.7–6.1)
SODIUM SERPL-SCNC: 141 MMOL/L (ref 136–145)
WBC # SPEC AUTO: 19.1 X10(3)/MCL (ref 4.5–11.5)

## 2021-01-04 ENCOUNTER — HISTORICAL (OUTPATIENT)
Dept: HEMATOLOGY/ONCOLOGY | Facility: CLINIC | Age: 66
End: 2021-01-04

## 2021-01-04 LAB
ABS NEUT (OLG): 3.13 X10(3)/MCL (ref 2.1–9.2)
ALBUMIN SERPL-MCNC: 4.5 GM/DL (ref 3.4–4.8)
ALBUMIN/GLOB SERPL: 2.1 RATIO (ref 1.1–2)
ALP SERPL-CCNC: 58 UNIT/L (ref 40–150)
ALT SERPL-CCNC: 21 UNIT/L (ref 0–55)
AST SERPL-CCNC: 17 UNIT/L (ref 5–34)
BASOPHILS # BLD AUTO: 0 X10(3)/MCL (ref 0–0.2)
BASOPHILS NFR BLD AUTO: 0.2 %
BILIRUB SERPL-MCNC: 1.3 MG/DL
BILIRUBIN DIRECT+TOT PNL SERPL-MCNC: 0.5 MG/DL (ref 0–0.5)
BILIRUBIN DIRECT+TOT PNL SERPL-MCNC: 0.8 MG/DL (ref 0–0.8)
BUN SERPL-MCNC: 16.5 MG/DL (ref 8.4–25.7)
CALCIUM SERPL-MCNC: 8.8 MG/DL (ref 8.8–10)
CHLORIDE SERPL-SCNC: 105 MMOL/L (ref 98–107)
CO2 SERPL-SCNC: 30 MMOL/L (ref 23–31)
CREAT SERPL-MCNC: 0.96 MG/DL (ref 0.73–1.18)
EOSINOPHIL # BLD AUTO: 0.3 X10(3)/MCL (ref 0–0.9)
EOSINOPHIL NFR BLD AUTO: 1.2 %
EOSINOPHIL NFR BLD MANUAL: 1 % (ref 0–8)
ERYTHROCYTE [DISTWIDTH] IN BLOOD BY AUTOMATED COUNT: 13.1 % (ref 11.5–17)
GLOBULIN SER-MCNC: 2.1 GM/DL (ref 2.4–3.5)
GLUCOSE SERPL-MCNC: 102 MG/DL (ref 82–115)
HCT VFR BLD AUTO: 46.1 % (ref 42–52)
HGB BLD-MCNC: 16 GM/DL (ref 14–18)
LDH SERPL-CCNC: 128 UNIT/L (ref 140–271)
LYMPHOCYTES # BLD AUTO: 18.6 X10(3)/MCL (ref 0.6–4.6)
LYMPHOCYTES NFR BLD AUTO: 79.6 %
LYMPHOCYTES NFR BLD MANUAL: 3 /MCL
LYMPHOCYTES NFR BLD MANUAL: 87 % (ref 13–40)
MCH RBC QN AUTO: 30.7 PG (ref 27–31)
MCHC RBC AUTO-ENTMCNC: 34.7 GM/DL (ref 33–36)
MCV RBC AUTO: 88.5 FL (ref 80–94)
MONOCYTES # BLD AUTO: 1.3 X10(3)/MCL (ref 0.1–1.3)
MONOCYTES NFR BLD AUTO: 5.6 %
MONOCYTES NFR BLD MANUAL: 4 % (ref 2–11)
NEUTROPHILS # BLD AUTO: 3.1 X10(3)/MCL (ref 2.1–9.2)
NEUTROPHILS NFR BLD AUTO: 13.3 %
NEUTROPHILS NFR BLD MANUAL: 8 % (ref 47–80)
PLATELET # BLD AUTO: 114 X10(3)/MCL (ref 130–400)
PLATELET # BLD EST: ADEQUATE 10*3/UL
PMV BLD AUTO: 10 FL (ref 9.4–12.4)
POTASSIUM SERPL-SCNC: 4.6 MMOL/L (ref 3.5–5.1)
PROT SERPL-MCNC: 6.6 GM/DL (ref 5.8–7.6)
RBC # BLD AUTO: 5.21 X10(6)/MCL (ref 4.7–6.1)
RBC MORPH BLD: NORMAL
SODIUM SERPL-SCNC: 143 MMOL/L (ref 136–145)
WBC # SPEC AUTO: 23.4 X10(3)/MCL (ref 4.5–11.5)

## 2021-05-03 ENCOUNTER — HISTORICAL (OUTPATIENT)
Dept: HEMATOLOGY/ONCOLOGY | Facility: CLINIC | Age: 66
End: 2021-05-03

## 2021-05-03 LAB
ABS NEUT (OLG): 2.95 X10(3)/MCL (ref 2.1–9.2)
ALBUMIN SERPL-MCNC: 4.2 GM/DL (ref 3.4–4.8)
ALBUMIN/GLOB SERPL: 2.1 RATIO (ref 1.1–2)
ALP SERPL-CCNC: 57 UNIT/L (ref 40–150)
ALT SERPL-CCNC: 21 UNIT/L (ref 0–55)
AST SERPL-CCNC: 17 UNIT/L (ref 5–34)
BASOPHILS # BLD AUTO: 0.1 X10(3)/MCL (ref 0–0.2)
BASOPHILS NFR BLD AUTO: 0.3 %
BILIRUB SERPL-MCNC: 0.9 MG/DL
BILIRUBIN DIRECT+TOT PNL SERPL-MCNC: 0.3 MG/DL (ref 0–0.5)
BILIRUBIN DIRECT+TOT PNL SERPL-MCNC: 0.6 MG/DL (ref 0–0.8)
BUN SERPL-MCNC: 17.6 MG/DL (ref 8.4–25.7)
CALCIUM SERPL-MCNC: 9.4 MG/DL (ref 8.8–10)
CHLORIDE SERPL-SCNC: 107 MMOL/L (ref 98–107)
CO2 SERPL-SCNC: 27 MMOL/L (ref 23–31)
CREAT SERPL-MCNC: 0.76 MG/DL (ref 0.73–1.18)
EOSINOPHIL # BLD AUTO: 0.5 X10(3)/MCL (ref 0–0.9)
EOSINOPHIL NFR BLD AUTO: 1.9 %
ERYTHROCYTE [DISTWIDTH] IN BLOOD BY AUTOMATED COUNT: 13.2 % (ref 11.5–17)
GLOBULIN SER-MCNC: 2 GM/DL (ref 2.4–3.5)
GLUCOSE SERPL-MCNC: 112 MG/DL (ref 82–115)
HCT VFR BLD AUTO: 43.5 % (ref 42–52)
HGB BLD-MCNC: 15.2 GM/DL (ref 14–18)
LDH SERPL-CCNC: 134 UNIT/L (ref 140–271)
LYMPHOCYTES # BLD AUTO: 22.4 X10(3)/MCL (ref 0.6–4.6)
LYMPHOCYTES NFR BLD AUTO: 80.2 %
MCH RBC QN AUTO: 30.7 PG (ref 27–31)
MCHC RBC AUTO-ENTMCNC: 34.9 GM/DL (ref 33–36)
MCV RBC AUTO: 87.9 FL (ref 80–94)
MONOCYTES # BLD AUTO: 2 X10(3)/MCL (ref 0.1–1.3)
MONOCYTES NFR BLD AUTO: 7 %
NEUTROPHILS # BLD AUTO: 3 X10(3)/MCL (ref 2.1–9.2)
NEUTROPHILS NFR BLD AUTO: 10.5 %
PLATELET # BLD AUTO: 137 X10(3)/MCL (ref 130–400)
PMV BLD AUTO: 10.5 FL (ref 9.4–12.4)
POTASSIUM SERPL-SCNC: 4 MMOL/L (ref 3.5–5.1)
PROT SERPL-MCNC: 6.2 GM/DL (ref 5.8–7.6)
RBC # BLD AUTO: 4.95 X10(6)/MCL (ref 4.7–6.1)
SODIUM SERPL-SCNC: 140 MMOL/L (ref 136–145)
WBC # SPEC AUTO: 27.9 X10(3)/MCL (ref 4.5–11.5)

## 2021-05-27 ENCOUNTER — HISTORICAL (OUTPATIENT)
Dept: ADMINISTRATIVE | Facility: HOSPITAL | Age: 66
End: 2021-05-27

## 2021-05-27 LAB
ABS NEUT (OLG): 3.11 X10(3)/MCL (ref 2.1–9.2)
ALBUMIN SERPL-MCNC: 4.3 GM/DL (ref 3.4–4.8)
ALBUMIN/GLOB SERPL: 2.2 RATIO (ref 1.1–2)
ALP SERPL-CCNC: 59 UNIT/L (ref 40–150)
ALT SERPL-CCNC: 18 UNIT/L (ref 0–55)
AST SERPL-CCNC: 17 UNIT/L (ref 5–34)
BILIRUB SERPL-MCNC: 1.1 MG/DL
BILIRUBIN DIRECT+TOT PNL SERPL-MCNC: 0.4 MG/DL (ref 0–0.5)
BILIRUBIN DIRECT+TOT PNL SERPL-MCNC: 0.7 MG/DL (ref 0–0.8)
BUN SERPL-MCNC: 18.7 MG/DL (ref 8.4–25.7)
CALCIUM SERPL-MCNC: 9.2 MG/DL (ref 8.8–10)
CHLORIDE SERPL-SCNC: 106 MMOL/L (ref 98–107)
CHOLEST SERPL-MCNC: 151 MG/DL
CHOLEST/HDLC SERPL: 5 {RATIO} (ref 0–5)
CO2 SERPL-SCNC: 28 MMOL/L (ref 23–31)
CREAT SERPL-MCNC: 0.86 MG/DL (ref 0.73–1.18)
EOSINOPHIL NFR BLD MANUAL: 6 % (ref 0–8)
ERYTHROCYTE [DISTWIDTH] IN BLOOD BY AUTOMATED COUNT: 13.5 % (ref 11.5–17)
GLOBULIN SER-MCNC: 2 GM/DL (ref 2.4–3.5)
GLUCOSE SERPL-MCNC: 101 MG/DL (ref 82–115)
HCT VFR BLD AUTO: 46.1 % (ref 42–52)
HDLC SERPL-MCNC: 31 MG/DL (ref 35–60)
HGB BLD-MCNC: 15.8 GM/DL (ref 14–18)
LDLC SERPL CALC-MCNC: 92 MG/DL (ref 50–140)
LYMPHOCYTES NFR BLD MANUAL: 60 % (ref 13–40)
MCH RBC QN AUTO: 30.6 PG (ref 27–31)
MCHC RBC AUTO-ENTMCNC: 34.3 GM/DL (ref 33–36)
MCV RBC AUTO: 89.3 FL (ref 80–94)
MONOCYTES NFR BLD MANUAL: 11 % (ref 2–11)
NEUTROPHILS NFR BLD MANUAL: 23 % (ref 47–80)
PLATELET # BLD AUTO: 159 X10(3)/MCL (ref 130–400)
PLATELET # BLD EST: NORMAL 10*3/UL
PMV BLD AUTO: 10.9 FL (ref 9.4–12.4)
POTASSIUM SERPL-SCNC: 4.8 MMOL/L (ref 3.5–5.1)
PROT SERPL-MCNC: 6.3 GM/DL (ref 5.8–7.6)
PSA SERPL-MCNC: 0.56 NG/ML
RBC # BLD AUTO: 5.16 X10(6)/MCL (ref 4.7–6.1)
RBC MORPH BLD: NORMAL
SODIUM SERPL-SCNC: 142 MMOL/L (ref 136–145)
TRIGL SERPL-MCNC: 138 MG/DL (ref 34–140)
TSH SERPL-ACNC: 2.67 UIU/ML (ref 0.35–4.94)
VLDLC SERPL CALC-MCNC: 28 MG/DL
WBC # SPEC AUTO: 31 X10(3)/MCL (ref 4.5–11.5)

## 2021-09-03 ENCOUNTER — HISTORICAL (OUTPATIENT)
Dept: HEMATOLOGY/ONCOLOGY | Facility: CLINIC | Age: 66
End: 2021-09-03

## 2021-09-03 LAB
ABS NEUT (OLG): 3.78 X10(3)/MCL (ref 2.1–9.2)
ALBUMIN SERPL-MCNC: 4.5 GM/DL (ref 3.4–4.8)
ALBUMIN/GLOB SERPL: 2.1 RATIO (ref 1.1–2)
ALP SERPL-CCNC: 63 UNIT/L (ref 40–150)
ALT SERPL-CCNC: 20 UNIT/L (ref 0–55)
AST SERPL-CCNC: 19 UNIT/L (ref 5–34)
BASOPHILS # BLD AUTO: 0.1 X10(3)/MCL (ref 0–0.2)
BASOPHILS NFR BLD AUTO: 0.2 %
BILIRUB SERPL-MCNC: 1.4 MG/DL
BILIRUBIN DIRECT+TOT PNL SERPL-MCNC: 0.5 MG/DL (ref 0–0.5)
BILIRUBIN DIRECT+TOT PNL SERPL-MCNC: 0.9 MG/DL (ref 0–0.8)
BUN SERPL-MCNC: 19.7 MG/DL (ref 8.4–25.7)
CALCIUM SERPL-MCNC: 9.7 MG/DL (ref 8.8–10)
CHLORIDE SERPL-SCNC: 104 MMOL/L (ref 98–107)
CO2 SERPL-SCNC: 29 MMOL/L (ref 23–31)
CREAT SERPL-MCNC: 1.02 MG/DL (ref 0.73–1.18)
EOSINOPHIL # BLD AUTO: 0.6 X10(3)/MCL (ref 0–0.9)
EOSINOPHIL NFR BLD AUTO: 1.6 %
ERYTHROCYTE [DISTWIDTH] IN BLOOD BY AUTOMATED COUNT: 13.2 % (ref 11.5–17)
GLOBULIN SER-MCNC: 2.1 GM/DL (ref 2.4–3.5)
GLUCOSE SERPL-MCNC: 103 MG/DL (ref 82–115)
HCT VFR BLD AUTO: 48.8 % (ref 42–52)
HGB BLD-MCNC: 16.6 GM/DL (ref 14–18)
LDH SERPL-CCNC: 143 UNIT/L (ref 140–271)
LYMPHOCYTES # BLD AUTO: 29.2 X10(3)/MCL (ref 0.6–4.6)
LYMPHOCYTES NFR BLD AUTO: 77.8 %
MCH RBC QN AUTO: 30.6 PG (ref 27–31)
MCHC RBC AUTO-ENTMCNC: 34 GM/DL (ref 33–36)
MCV RBC AUTO: 89.9 FL (ref 80–94)
MONOCYTES # BLD AUTO: 3.8 X10(3)/MCL (ref 0.1–1.3)
MONOCYTES NFR BLD AUTO: 10.3 %
NEUTROPHILS # BLD AUTO: 3.8 X10(3)/MCL (ref 2.1–9.2)
NEUTROPHILS NFR BLD AUTO: 10.1 %
PLATELET # BLD AUTO: 153 X10(3)/MCL (ref 130–400)
PMV BLD AUTO: 10.2 FL (ref 9.4–12.4)
POTASSIUM SERPL-SCNC: 4.4 MMOL/L (ref 3.5–5.1)
PROT SERPL-MCNC: 6.6 GM/DL (ref 5.8–7.6)
RBC # BLD AUTO: 5.43 X10(6)/MCL (ref 4.7–6.1)
SODIUM SERPL-SCNC: 142 MMOL/L (ref 136–145)
WBC # SPEC AUTO: 37.6 X10(3)/MCL (ref 4.5–11.5)

## 2022-01-01 ENCOUNTER — HOSPITAL ENCOUNTER (INPATIENT)
Facility: HOSPITAL | Age: 67
LOS: 17 days | DRG: 802 | End: 2022-09-05
Attending: EMERGENCY MEDICINE | Admitting: INTERNAL MEDICINE
Payer: MEDICARE

## 2022-01-01 ENCOUNTER — LAB REQUISITION (OUTPATIENT)
Dept: LAB | Facility: HOSPITAL | Age: 67
End: 2022-01-01
Payer: MEDICARE

## 2022-01-01 ENCOUNTER — LAB VISIT (OUTPATIENT)
Dept: LAB | Facility: HOSPITAL | Age: 67
End: 2022-01-01
Attending: INTERNAL MEDICINE
Payer: MEDICARE

## 2022-01-01 ENCOUNTER — INFUSION (OUTPATIENT)
Dept: INFUSION THERAPY | Facility: HOSPITAL | Age: 67
DRG: 802 | End: 2022-01-01
Attending: INTERNAL MEDICINE
Payer: MEDICARE

## 2022-01-01 ENCOUNTER — TELEPHONE (OUTPATIENT)
Dept: INTERNAL MEDICINE | Facility: CLINIC | Age: 67
End: 2022-01-01
Payer: MEDICARE

## 2022-01-01 ENCOUNTER — TELEPHONE (OUTPATIENT)
Dept: HEMATOLOGY/ONCOLOGY | Facility: CLINIC | Age: 67
End: 2022-01-01
Payer: MEDICARE

## 2022-01-01 ENCOUNTER — HISTORICAL (OUTPATIENT)
Dept: HEMATOLOGY/ONCOLOGY | Facility: CLINIC | Age: 67
End: 2022-01-01

## 2022-01-01 ENCOUNTER — HISTORICAL (OUTPATIENT)
Dept: ADMINISTRATIVE | Facility: HOSPITAL | Age: 67
End: 2022-01-01
Payer: MEDICARE

## 2022-01-01 ENCOUNTER — INFUSION (OUTPATIENT)
Dept: INFUSION THERAPY | Facility: HOSPITAL | Age: 67
End: 2022-01-01
Attending: INTERNAL MEDICINE
Payer: MEDICARE

## 2022-01-01 ENCOUNTER — DOCUMENTATION ONLY (OUTPATIENT)
Dept: HEMATOLOGY/ONCOLOGY | Facility: CLINIC | Age: 67
End: 2022-01-01
Payer: MEDICARE

## 2022-01-01 ENCOUNTER — ANESTHESIA EVENT (OUTPATIENT)
Dept: SURGERY | Facility: HOSPITAL | Age: 67
DRG: 802 | End: 2022-01-01
Payer: MEDICARE

## 2022-01-01 ENCOUNTER — PATIENT MESSAGE (OUTPATIENT)
Dept: HEMATOLOGY/ONCOLOGY | Facility: CLINIC | Age: 67
End: 2022-01-01
Payer: MEDICARE

## 2022-01-01 ENCOUNTER — OFFICE VISIT (OUTPATIENT)
Dept: HEMATOLOGY/ONCOLOGY | Facility: CLINIC | Age: 67
End: 2022-01-01
Payer: MEDICARE

## 2022-01-01 ENCOUNTER — ANESTHESIA (OUTPATIENT)
Dept: SURGERY | Facility: HOSPITAL | Age: 67
DRG: 802 | End: 2022-01-01
Payer: MEDICARE

## 2022-01-01 ENCOUNTER — TELEPHONE (OUTPATIENT)
Dept: NEUROSURGERY | Facility: CLINIC | Age: 67
End: 2022-01-01
Payer: MEDICARE

## 2022-01-01 ENCOUNTER — HISTORICAL (OUTPATIENT)
Dept: CARDIOLOGY | Facility: HOSPITAL | Age: 67
End: 2022-01-01
Payer: MEDICARE

## 2022-01-01 VITALS
TEMPERATURE: 98 F | DIASTOLIC BLOOD PRESSURE: 64 MMHG | SYSTOLIC BLOOD PRESSURE: 110 MMHG | WEIGHT: 264.56 LBS | RESPIRATION RATE: 18 BRPM | HEART RATE: 63 BPM | BODY MASS INDEX: 35.83 KG/M2 | HEIGHT: 72 IN

## 2022-01-01 VITALS
SYSTOLIC BLOOD PRESSURE: 105 MMHG | HEIGHT: 73 IN | DIASTOLIC BLOOD PRESSURE: 70 MMHG | TEMPERATURE: 97 F | WEIGHT: 266.19 LBS | OXYGEN SATURATION: 99 % | HEART RATE: 68 BPM | RESPIRATION RATE: 18 BRPM | BODY MASS INDEX: 35.28 KG/M2

## 2022-01-01 VITALS
TEMPERATURE: 98 F | RESPIRATION RATE: 18 BRPM | OXYGEN SATURATION: 97 % | HEART RATE: 51 BPM | DIASTOLIC BLOOD PRESSURE: 63 MMHG | WEIGHT: 264.56 LBS | BODY MASS INDEX: 34.9 KG/M2 | SYSTOLIC BLOOD PRESSURE: 115 MMHG

## 2022-01-01 VITALS
SYSTOLIC BLOOD PRESSURE: 119 MMHG | RESPIRATION RATE: 20 BRPM | TEMPERATURE: 98 F | HEIGHT: 73 IN | DIASTOLIC BLOOD PRESSURE: 77 MMHG | HEART RATE: 66 BPM | WEIGHT: 264.56 LBS | BODY MASS INDEX: 35.06 KG/M2

## 2022-01-01 VITALS
OXYGEN SATURATION: 84 % | DIASTOLIC BLOOD PRESSURE: 104 MMHG | RESPIRATION RATE: 21 BRPM | HEART RATE: 109 BPM | BODY MASS INDEX: 36.91 KG/M2 | SYSTOLIC BLOOD PRESSURE: 188 MMHG | HEIGHT: 72 IN | TEMPERATURE: 103 F | WEIGHT: 272.5 LBS

## 2022-01-01 VITALS
SYSTOLIC BLOOD PRESSURE: 122 MMHG | OXYGEN SATURATION: 98 % | DIASTOLIC BLOOD PRESSURE: 76 MMHG | WEIGHT: 272 LBS | HEIGHT: 72 IN | BODY MASS INDEX: 36.84 KG/M2

## 2022-01-01 VITALS
HEART RATE: 76 BPM | DIASTOLIC BLOOD PRESSURE: 73 MMHG | RESPIRATION RATE: 19 BRPM | SYSTOLIC BLOOD PRESSURE: 116 MMHG | TEMPERATURE: 99 F

## 2022-01-01 VITALS
RESPIRATION RATE: 18 BRPM | HEIGHT: 73 IN | WEIGHT: 269.63 LBS | TEMPERATURE: 97 F | OXYGEN SATURATION: 99 % | BODY MASS INDEX: 35.73 KG/M2 | HEART RATE: 77 BPM | SYSTOLIC BLOOD PRESSURE: 128 MMHG | DIASTOLIC BLOOD PRESSURE: 85 MMHG

## 2022-01-01 DIAGNOSIS — C91.10 CLL (CHRONIC LYMPHOCYTIC LEUKEMIA): Primary | ICD-10-CM

## 2022-01-01 DIAGNOSIS — D69.3 CHRONIC ITP (IDIOPATHIC THROMBOCYTOPENIA): ICD-10-CM

## 2022-01-01 DIAGNOSIS — D69.3 CHRONIC ITP (IDIOPATHIC THROMBOCYTOPENIA): Primary | ICD-10-CM

## 2022-01-01 DIAGNOSIS — R07.9 CHEST PAIN: ICD-10-CM

## 2022-01-01 DIAGNOSIS — C91.12 CLL (CHRONIC LYMPHOID LEUKEMIA) IN RELAPSE: Primary | ICD-10-CM

## 2022-01-01 DIAGNOSIS — C91.12 CLL (CHRONIC LYMPHOID LEUKEMIA) IN RELAPSE: ICD-10-CM

## 2022-01-01 DIAGNOSIS — Z00.00 WELLNESS EXAMINATION: Primary | ICD-10-CM

## 2022-01-01 DIAGNOSIS — C91.12 CHRONIC LYMPHOID LEUKEMIA IN RELAPSE: ICD-10-CM

## 2022-01-01 DIAGNOSIS — C91.10 CHRONIC LYMPHOCYTIC LEUKEMIA OF B-CELL TYPE NOT HAVING ACHIEVED REMISSION: Primary | ICD-10-CM

## 2022-01-01 DIAGNOSIS — C91.10 CLL (CHRONIC LYMPHOCYTIC LEUKEMIA): ICD-10-CM

## 2022-01-01 DIAGNOSIS — Z12.5 SCREENING PSA (PROSTATE SPECIFIC ANTIGEN): ICD-10-CM

## 2022-01-01 DIAGNOSIS — S06.5XAA SUBDURAL HEMATOMA: Primary | ICD-10-CM

## 2022-01-01 DIAGNOSIS — I62.00 NONTRAUMATIC SUBDURAL HEMORRHAGE, UNSPECIFIED: ICD-10-CM

## 2022-01-01 DIAGNOSIS — S50.11XA CONTUSION OF RIGHT FOREARM, INITIAL ENCOUNTER: ICD-10-CM

## 2022-01-01 DIAGNOSIS — R73.01 IFG (IMPAIRED FASTING GLUCOSE): ICD-10-CM

## 2022-01-01 DIAGNOSIS — R10.30 LOWER ABDOMINAL PAIN, UNSPECIFIED: ICD-10-CM

## 2022-01-01 DIAGNOSIS — C91.10 CHRONIC LYMPHOCYTIC LEUKEMIA OF B-CELL TYPE NOT HAVING ACHIEVED REMISSION: ICD-10-CM

## 2022-01-01 DIAGNOSIS — D84.9 IMMUNOCOMPROMISED: ICD-10-CM

## 2022-01-01 DIAGNOSIS — I10 HYPERTENSION, UNSPECIFIED TYPE: ICD-10-CM

## 2022-01-01 DIAGNOSIS — D32.9 MENINGIOMA: Primary | ICD-10-CM

## 2022-01-01 DIAGNOSIS — R55 SYNCOPE: ICD-10-CM

## 2022-01-01 DIAGNOSIS — C91.10 CHRONIC LYMPHOID LEUKEMIA: ICD-10-CM

## 2022-01-01 DIAGNOSIS — D69.6 THROMBOCYTOPENIA: ICD-10-CM

## 2022-01-01 DIAGNOSIS — I10 HYPERTENSION, UNSPECIFIED TYPE: Primary | ICD-10-CM

## 2022-01-01 DIAGNOSIS — J18.9 PNEUMONIA OF LEFT LOWER LOBE DUE TO INFECTIOUS ORGANISM: ICD-10-CM

## 2022-01-01 DIAGNOSIS — C91.12 CHRONIC LYMPHOID LEUKEMIA IN RELAPSE: Primary | ICD-10-CM

## 2022-01-01 DIAGNOSIS — R10.30 LOWER ABDOMINAL PAIN: ICD-10-CM

## 2022-01-01 DIAGNOSIS — R53.83 FATIGUE, UNSPECIFIED TYPE: ICD-10-CM

## 2022-01-01 DIAGNOSIS — R50.9 FEVER, UNSPECIFIED FEVER CAUSE: ICD-10-CM

## 2022-01-01 LAB
% BLASTS: 1
% NEUTROPHILS (OHS): 1
1,3 BETA GLUCAN SER QL: NEGATIVE
1,3 BETA GLUCAN SER-MCNC: <31 PG/ML
ABO + RH BLD: NORMAL
ABS NEUT (OLG): 0.22 X10(3)/MCL (ref 2.1–9.2)
ABS NEUT (OLG): 0.23 X10(3)/MCL (ref 2.1–9.2)
ABS NEUT (OLG): 0.29 X10(3)/MCL (ref 2.1–9.2)
ABS NEUT (OLG): 2.53 X10(3)/MCL (ref 2.1–9.2)
ABS NEUT (OLG): 3.4 X10(3)/MCL (ref 2.1–9.2)
ABS NEUT (OLG): 3.4 X10(3)/MCL (ref 2.1–9.2)
ABS NEUT (OLG): 3.78 X10(3)/MCL (ref 2.1–9.2)
ABS NEUT (OLG): 4.02 (ref 2.1–9.2)
ABS NEUT (OLG): ABNORMAL
ABS NEUT CALC (OHS): 3.37 X10(3)/MCL (ref 2.1–9.2)
ALBUMIN SERPL-MCNC: 1.9 GM/DL (ref 3.4–4.8)
ALBUMIN SERPL-MCNC: 2.2 GM/DL (ref 3.4–4.8)
ALBUMIN SERPL-MCNC: 2.2 GM/DL (ref 3.4–4.8)
ALBUMIN SERPL-MCNC: 2.3 GM/DL (ref 3.4–4.8)
ALBUMIN SERPL-MCNC: 2.7 GM/DL (ref 3.4–4.8)
ALBUMIN SERPL-MCNC: 2.7 GM/DL (ref 3.4–4.8)
ALBUMIN SERPL-MCNC: 2.9 GM/DL (ref 3.4–4.8)
ALBUMIN SERPL-MCNC: 2.9 GM/DL (ref 3.4–4.8)
ALBUMIN SERPL-MCNC: 3 GM/DL (ref 3.4–4.8)
ALBUMIN SERPL-MCNC: 3.4 GM/DL (ref 3.4–4.8)
ALBUMIN SERPL-MCNC: 3.8 GM/DL (ref 3.4–4.8)
ALBUMIN SERPL-MCNC: 4.1 GM/DL (ref 3.4–4.8)
ALBUMIN SERPL-MCNC: 4.2 GM/DL (ref 3.4–4.8)
ALBUMIN SERPL-MCNC: 4.2 GM/DL (ref 3.4–4.8)
ALBUMIN SERPL-MCNC: 4.3 GM/DL (ref 3.4–4.8)
ALBUMIN SERPL-MCNC: 4.4 GM/DL (ref 3.4–4.8)
ALBUMIN/GLOB SERPL: 0.6 RATIO (ref 1.1–2)
ALBUMIN/GLOB SERPL: 0.8 RATIO (ref 1.1–2)
ALBUMIN/GLOB SERPL: 0.9 RATIO (ref 1.1–2)
ALBUMIN/GLOB SERPL: 0.9 RATIO (ref 1.1–2)
ALBUMIN/GLOB SERPL: 1 RATIO (ref 1.1–2)
ALBUMIN/GLOB SERPL: 1.1 RATIO (ref 1.1–2)
ALBUMIN/GLOB SERPL: 1.2 RATIO (ref 1.1–2)
ALBUMIN/GLOB SERPL: 1.9 RATIO (ref 1.1–2)
ALBUMIN/GLOB SERPL: 2 RATIO (ref 1.1–2)
ALBUMIN/GLOB SERPL: 2.2 RATIO (ref 1.1–2)
ALP SERPL-CCNC: 101 UNIT/L (ref 40–150)
ALP SERPL-CCNC: 44 UNIT/L (ref 40–150)
ALP SERPL-CCNC: 47 UNIT/L (ref 40–150)
ALP SERPL-CCNC: 49 UNIT/L (ref 40–150)
ALP SERPL-CCNC: 59 UNIT/L (ref 40–150)
ALP SERPL-CCNC: 59 UNIT/L (ref 40–150)
ALP SERPL-CCNC: 64 UNIT/L (ref 40–150)
ALP SERPL-CCNC: 65 UNIT/L (ref 40–150)
ALP SERPL-CCNC: 67 UNIT/L (ref 40–150)
ALP SERPL-CCNC: 69 UNIT/L (ref 40–150)
ALP SERPL-CCNC: 70 UNIT/L (ref 40–150)
ALP SERPL-CCNC: 70 UNIT/L (ref 40–150)
ALP SERPL-CCNC: 71 UNIT/L (ref 40–150)
ALP SERPL-CCNC: 77 UNIT/L (ref 40–150)
ALP SERPL-CCNC: 83 UNIT/L (ref 40–150)
ALP SERPL-CCNC: 97 UNIT/L (ref 40–150)
ALT SERPL-CCNC: 15 UNIT/L (ref 0–55)
ALT SERPL-CCNC: 19 UNIT/L (ref 0–55)
ALT SERPL-CCNC: 20 UNIT/L (ref 0–55)
ALT SERPL-CCNC: 20 UNIT/L (ref 0–55)
ALT SERPL-CCNC: 21 UNIT/L (ref 0–55)
ALT SERPL-CCNC: 22 UNIT/L (ref 0–55)
ALT SERPL-CCNC: 22 UNIT/L (ref 0–55)
ALT SERPL-CCNC: 27 UNIT/L (ref 0–55)
ALT SERPL-CCNC: 37 UNIT/L (ref 0–55)
ALT SERPL-CCNC: 42 UNIT/L (ref 0–55)
ALT SERPL-CCNC: 43 UNIT/L (ref 0–55)
ALT SERPL-CCNC: 49 UNIT/L (ref 0–55)
ALT SERPL-CCNC: 59 UNIT/L (ref 0–55)
ALT SERPL-CCNC: 72 UNIT/L (ref 0–55)
ALT SERPL-CCNC: 77 UNIT/L (ref 0–55)
ALT SERPL-CCNC: 80 UNIT/L (ref 0–55)
ANION GAP SERPL CALC-SCNC: 10 MEQ/L
ANISOCYTOSIS BLD QL SMEAR: ABNORMAL
APPEARANCE UR: CLEAR
AST SERPL-CCNC: 12 UNIT/L (ref 5–34)
AST SERPL-CCNC: 13 UNIT/L (ref 5–34)
AST SERPL-CCNC: 17 UNIT/L (ref 5–34)
AST SERPL-CCNC: 17 UNIT/L (ref 5–34)
AST SERPL-CCNC: 18 UNIT/L (ref 5–34)
AST SERPL-CCNC: 18 UNIT/L (ref 5–34)
AST SERPL-CCNC: 19 UNIT/L (ref 5–34)
AST SERPL-CCNC: 19 UNIT/L (ref 5–34)
AST SERPL-CCNC: 20 UNIT/L (ref 5–34)
AST SERPL-CCNC: 23 UNIT/L (ref 5–34)
AST SERPL-CCNC: 25 UNIT/L (ref 5–34)
AST SERPL-CCNC: 28 UNIT/L (ref 5–34)
AST SERPL-CCNC: 28 UNIT/L (ref 5–34)
AST SERPL-CCNC: 33 UNIT/L (ref 5–34)
AST SERPL-CCNC: 43 UNIT/L (ref 5–34)
AST SERPL-CCNC: 9 UNIT/L (ref 5–34)
B PARAP IS1001 DNA CT SPEC QN NAA+PROBE: NOT DETECTED
B PERT+PARAP PTXS1 CT SPEC QN NAA+PROBE: NOT DETECTED
B-HCG SERPL QL: 5 %
BACTERIA #/AREA URNS AUTO: NORMAL /HPF
BACTERIA BLD CULT: NORMAL
BACTERIA FLD CULT: NORMAL
BACTERIA SPEC ANAEROBE CULT: NORMAL
BACTERIA SPEC CULT: ABNORMAL
BACTERIA SPEC CULT: ABNORMAL
BASOPHILS # BLD AUTO: 0.02 X10(3)/MCL (ref 0–0.2)
BASOPHILS # BLD AUTO: 0.03 X10(3)/MCL (ref 0–0.2)
BASOPHILS # BLD AUTO: 0.04 X10(3)/MCL (ref 0–0.2)
BASOPHILS # BLD AUTO: 0.04 X10(3)/MCL (ref 0–0.2)
BASOPHILS # BLD AUTO: 0.06 X10(3)/MCL (ref 0–0.2)
BASOPHILS # BLD AUTO: 0.08 X10(3)/MCL (ref 0–0.2)
BASOPHILS # BLD AUTO: 0.09 X10(3)/MCL (ref 0–0.2)
BASOPHILS # BLD AUTO: 0.1 10*3/UL (ref 0–0.2)
BASOPHILS # BLD AUTO: 0.1 X10(3)/MCL (ref 0–0.2)
BASOPHILS # BLD AUTO: 0.16 X10(3)/MCL (ref 0–0.2)
BASOPHILS # BLD AUTO: 0.18 X10(3)/MCL (ref 0–0.2)
BASOPHILS # BLD AUTO: 0.19 X10(3)/MCL (ref 0–0.2)
BASOPHILS # BLD AUTO: 0.3 X10(3)/MCL (ref 0–0.2)
BASOPHILS NFR BLD AUTO: 0.1 %
BASOPHILS NFR BLD AUTO: 0.2 %
BASOPHILS NFR BLD AUTO: 0.3 %
BASOPHILS NFR BLD AUTO: 0.3 %
BASOPHILS NFR BLD AUTO: 0.5 %
BASOPHILS NFR BLD AUTO: 0.6 %
BILIRUB SERPL-MCNC: 1.3 MG/DL
BILIRUB UR QL STRIP.AUTO: NEGATIVE MG/DL
BILIRUBIN DIRECT+TOT PNL SERPL-MCNC: 0.5 MG/DL (ref 0–0.5)
BILIRUBIN DIRECT+TOT PNL SERPL-MCNC: 0.8 MG/DL (ref 0–0.8)
BILIRUBIN DIRECT+TOT PNL SERPL-MCNC: 0.9 MG/DL
BILIRUBIN DIRECT+TOT PNL SERPL-MCNC: 1.1 MG/DL
BILIRUBIN DIRECT+TOT PNL SERPL-MCNC: 1.3 MG/DL
BILIRUBIN DIRECT+TOT PNL SERPL-MCNC: 1.3 MG/DL
BILIRUBIN DIRECT+TOT PNL SERPL-MCNC: 1.5 MG/DL
BILIRUBIN DIRECT+TOT PNL SERPL-MCNC: 1.6 MG/DL
BILIRUBIN DIRECT+TOT PNL SERPL-MCNC: 1.7 MG/DL
BILIRUBIN DIRECT+TOT PNL SERPL-MCNC: 1.7 MG/DL
BILIRUBIN DIRECT+TOT PNL SERPL-MCNC: 1.8 MG/DL
BILIRUBIN DIRECT+TOT PNL SERPL-MCNC: 1.9 MG/DL
BILIRUBIN DIRECT+TOT PNL SERPL-MCNC: 2 MG/DL
BILIRUBIN DIRECT+TOT PNL SERPL-MCNC: 2.1 MG/DL
BILIRUBIN DIRECT+TOT PNL SERPL-MCNC: 2.4 MG/DL
BLD PROD TYP BPU: NORMAL
BLOOD UNIT EXPIRATION DATE: NORMAL
BLOOD UNIT TYPE CODE: 1700
BLOOD UNIT TYPE CODE: 5100
BLOOD UNIT TYPE CODE: 5100
BLOOD UNIT TYPE CODE: 6200
BLOOD UNIT TYPE CODE: 7300
BLOOD UNIT TYPE CODE: 8400
BUN SERPL-MCNC: 15.6 MG/DL (ref 8.4–25.7)
BUN SERPL-MCNC: 15.9 MG/DL (ref 8.4–25.7)
BUN SERPL-MCNC: 16.6 MG/DL (ref 8.4–25.7)
BUN SERPL-MCNC: 17.1 MG/DL (ref 8.4–25.7)
BUN SERPL-MCNC: 18.6 MG/DL (ref 8.4–25.7)
BUN SERPL-MCNC: 21.3 MG/DL (ref 8.4–25.7)
BUN SERPL-MCNC: 22.1 MG/DL (ref 8.4–25.7)
BUN SERPL-MCNC: 23.2 MG/DL (ref 8.4–25.7)
BUN SERPL-MCNC: 23.2 MG/DL (ref 8.4–25.7)
BUN SERPL-MCNC: 24.5 MG/DL (ref 8.4–25.7)
BUN SERPL-MCNC: 24.7 MG/DL (ref 8.4–25.7)
BUN SERPL-MCNC: 26.5 MG/DL (ref 8.4–25.7)
BUN SERPL-MCNC: 26.5 MG/DL (ref 8.4–25.7)
BUN SERPL-MCNC: 27.1 MG/DL (ref 8.4–25.7)
BUN SERPL-MCNC: 27.3 MG/DL (ref 8.4–25.7)
BUN SERPL-MCNC: 28.3 MG/DL (ref 8.4–25.7)
BUN SERPL-MCNC: 28.7 MG/DL (ref 8.4–25.7)
CALCIUM SERPL-MCNC: 7.5 MG/DL (ref 8.8–10)
CALCIUM SERPL-MCNC: 7.6 MG/DL (ref 8.8–10)
CALCIUM SERPL-MCNC: 7.7 MG/DL (ref 8.8–10)
CALCIUM SERPL-MCNC: 7.9 MG/DL (ref 8.8–10)
CALCIUM SERPL-MCNC: 8.1 MG/DL (ref 8.8–10)
CALCIUM SERPL-MCNC: 8.3 MG/DL (ref 8.8–10)
CALCIUM SERPL-MCNC: 8.4 MG/DL (ref 8.8–10)
CALCIUM SERPL-MCNC: 8.4 MG/DL (ref 8.8–10)
CALCIUM SERPL-MCNC: 8.5 MG/DL (ref 8.8–10)
CALCIUM SERPL-MCNC: 9 MG/DL (ref 8.8–10)
CALCIUM SERPL-MCNC: 9.1 MG/DL (ref 8.7–10.5)
CALCIUM SERPL-MCNC: 9.2 MG/DL (ref 8.8–10)
CALCIUM SERPL-MCNC: 9.3 MG/DL (ref 8.8–10)
CHLAMYDIA SP IGG+IGM PNL TITR SER IF: NOT DETECTED
CHLORIDE SERPL-SCNC: 102 MMOL/L (ref 98–107)
CHLORIDE SERPL-SCNC: 104 MMOL/L (ref 98–107)
CHLORIDE SERPL-SCNC: 104 MMOL/L (ref 98–107)
CHLORIDE SERPL-SCNC: 105 MMOL/L (ref 98–107)
CHLORIDE SERPL-SCNC: 106 MMOL/L (ref 98–107)
CHLORIDE SERPL-SCNC: 106 MMOL/L (ref 98–107)
CHLORIDE SERPL-SCNC: 107 MMOL/L (ref 98–107)
CHLORIDE SERPL-SCNC: 107 MMOL/L (ref 98–107)
CHLORIDE SERPL-SCNC: 108 MMOL/L (ref 98–107)
CHLORIDE SERPL-SCNC: 110 MMOL/L (ref 98–107)
CHLORIDE SERPL-SCNC: 112 MMOL/L (ref 98–107)
CHLORIDE SERPL-SCNC: 88 MMOL/L (ref 98–107)
CHLORIDE SERPL-SCNC: 88 MMOL/L (ref 98–107)
CHLORIDE SERPL-SCNC: 91 MMOL/L (ref 98–107)
CHLORIDE SERPL-SCNC: 92 MMOL/L (ref 98–107)
CHLORIDE SERPL-SCNC: 93 MMOL/L (ref 98–107)
CHLORIDE SERPL-SCNC: 98 MMOL/L (ref 98–107)
CHOLEST SERPL-MCNC: 132 MG/DL
CHOLEST/HDLC SERPL: 4 {RATIO} (ref 0–5)
CO2 SERPL-SCNC: 20 MMOL/L (ref 23–31)
CO2 SERPL-SCNC: 22 MMOL/L (ref 23–31)
CO2 SERPL-SCNC: 23 MMOL/L (ref 23–31)
CO2 SERPL-SCNC: 24 MMOL/L (ref 23–31)
CO2 SERPL-SCNC: 25 MMOL/L (ref 23–31)
CO2 SERPL-SCNC: 25 MMOL/L (ref 23–31)
CO2 SERPL-SCNC: 26 MMOL/L (ref 23–31)
CO2 SERPL-SCNC: 27 MMOL/L (ref 23–31)
COLOR UR AUTO: YELLOW
CREAT SERPL-MCNC: 0.68 MG/DL (ref 0.73–1.18)
CREAT SERPL-MCNC: 0.72 MG/DL (ref 0.73–1.18)
CREAT SERPL-MCNC: 0.72 MG/DL (ref 0.73–1.18)
CREAT SERPL-MCNC: 0.74 MG/DL (ref 0.73–1.18)
CREAT SERPL-MCNC: 0.75 MG/DL (ref 0.73–1.18)
CREAT SERPL-MCNC: 0.76 MG/DL (ref 0.73–1.18)
CREAT SERPL-MCNC: 0.78 MG/DL (ref 0.73–1.18)
CREAT SERPL-MCNC: 0.79 MG/DL (ref 0.73–1.18)
CREAT SERPL-MCNC: 0.8 MG/DL (ref 0.73–1.18)
CREAT SERPL-MCNC: 0.82 MG/DL (ref 0.73–1.18)
CREAT SERPL-MCNC: 0.83 MG/DL (ref 0.73–1.18)
CREAT SERPL-MCNC: 0.86 MG/DL (ref 0.73–1.18)
CREAT SERPL-MCNC: 0.92 MG/DL (ref 0.73–1.18)
CREAT SERPL-MCNC: 0.95 MG/DL (ref 0.73–1.18)
CREAT SERPL-MCNC: 0.99 MG/DL (ref 0.73–1.18)
CREAT/UREA NIT SERPL: 28
CROSSMATCH INTERPRETATION: NORMAL
DISPENSE STATUS: NORMAL
EOSINOPHIL # BLD AUTO: 0.03 X10(3)/MCL (ref 0–0.9)
EOSINOPHIL # BLD AUTO: 0.05 X10(3)/MCL (ref 0–0.9)
EOSINOPHIL # BLD AUTO: 0.06 X10(3)/MCL (ref 0–0.9)
EOSINOPHIL # BLD AUTO: 0.06 X10(3)/MCL (ref 0–0.9)
EOSINOPHIL # BLD AUTO: 0.11 X10(3)/MCL (ref 0–0.9)
EOSINOPHIL # BLD AUTO: 0.16 X10(3)/MCL (ref 0–0.9)
EOSINOPHIL # BLD AUTO: 0.17 X10(3)/MCL (ref 0–0.9)
EOSINOPHIL # BLD AUTO: 0.17 X10(3)/MCL (ref 0–0.9)
EOSINOPHIL # BLD AUTO: 0.27 X10(3)/MCL (ref 0–0.9)
EOSINOPHIL # BLD AUTO: 0.28 X10(3)/MCL (ref 0–0.9)
EOSINOPHIL # BLD AUTO: 0.3 10*3/UL (ref 0–0.9)
EOSINOPHIL # BLD AUTO: 0.3 X10(3)/MCL (ref 0–0.9)
EOSINOPHIL # BLD AUTO: 0.3 X10(3)/MCL (ref 0–0.9)
EOSINOPHIL NFR BLD AUTO: 0.1 %
EOSINOPHIL NFR BLD AUTO: 0.1 %
EOSINOPHIL NFR BLD AUTO: 0.2 %
EOSINOPHIL NFR BLD AUTO: 0.2 %
EOSINOPHIL NFR BLD AUTO: 0.3 %
EOSINOPHIL NFR BLD AUTO: 0.4 %
EOSINOPHIL NFR BLD AUTO: 0.5 %
EOSINOPHIL NFR BLD AUTO: 0.5 %
EOSINOPHIL NFR BLD AUTO: 0.6 %
EOSINOPHIL NFR BLD MANUAL: 0.12 X10(3)/MCL (ref 0–0.9)
EOSINOPHIL NFR BLD MANUAL: 1 %
EOSINOPHIL NFR BLD MANUAL: 2 %
ERYTHROCYTE [DISTWIDTH] IN BLOOD BY AUTOMATED COUNT: 13.5 % (ref 11.5–17)
ERYTHROCYTE [DISTWIDTH] IN BLOOD BY AUTOMATED COUNT: 13.5 % (ref 11.5–17)
ERYTHROCYTE [DISTWIDTH] IN BLOOD BY AUTOMATED COUNT: 13.6 % (ref 11.5–17)
ERYTHROCYTE [DISTWIDTH] IN BLOOD BY AUTOMATED COUNT: 13.7 % (ref 11.5–17)
ERYTHROCYTE [DISTWIDTH] IN BLOOD BY AUTOMATED COUNT: 13.9 % (ref 11.5–17)
ERYTHROCYTE [DISTWIDTH] IN BLOOD BY AUTOMATED COUNT: 14 % (ref 11.5–17)
ERYTHROCYTE [DISTWIDTH] IN BLOOD BY AUTOMATED COUNT: 14.1 % (ref 11.5–17)
ERYTHROCYTE [DISTWIDTH] IN BLOOD BY AUTOMATED COUNT: 14.1 % (ref 11.5–17)
ERYTHROCYTE [DISTWIDTH] IN BLOOD BY AUTOMATED COUNT: 14.2 % (ref 11.5–17)
ERYTHROCYTE [DISTWIDTH] IN BLOOD BY AUTOMATED COUNT: 14.3 % (ref 11.5–17)
ERYTHROCYTE [DISTWIDTH] IN BLOOD BY AUTOMATED COUNT: 14.4 % (ref 11.5–17)
ERYTHROCYTE [DISTWIDTH] IN BLOOD BY AUTOMATED COUNT: 14.5 % (ref 11.5–17)
ERYTHROCYTE [DISTWIDTH] IN BLOOD BY AUTOMATED COUNT: 14.6 % (ref 11.5–17)
ERYTHROCYTE [DISTWIDTH] IN BLOOD BY AUTOMATED COUNT: 14.7 % (ref 11.5–17)
EST. AVERAGE GLUCOSE BLD GHB EST-MCNC: 88.2 MG/DL
FLUAV AG UPPER RESP QL IA.RAPID: NOT DETECTED
FLUAV H1 2009 RNA SPEC NAA+PROBE-IMP: NORMAL
FLUAV H3 HA GENE NPH QL NAA+PROBE: NORMAL
FLUBV AG UPPER RESP QL IA.RAPID: NOT DETECTED
GALACTOMANNAN AG SERPL IA-ACNC: <0.5 INDEX
GFR SERPLBLD CREATININE-BSD FMLA CKD-EPI: >60 MLS/MIN/1.73/M2
GLOBULIN SER-MCNC: 1.9 GM/DL (ref 2.4–3.5)
GLOBULIN SER-MCNC: 2 GM/DL (ref 2.4–3.5)
GLOBULIN SER-MCNC: 2 GM/DL (ref 2.4–3.5)
GLOBULIN SER-MCNC: 2.1 GM/DL (ref 2.4–3.5)
GLOBULIN SER-MCNC: 2.2 GM/DL (ref 2.4–3.5)
GLOBULIN SER-MCNC: 2.6 GM/DL (ref 2.4–3.5)
GLOBULIN SER-MCNC: 2.6 GM/DL (ref 2.4–3.5)
GLOBULIN SER-MCNC: 2.8 GM/DL (ref 2.4–3.5)
GLOBULIN SER-MCNC: 2.9 GM/DL (ref 2.4–3.5)
GLOBULIN SER-MCNC: 3.1 GM/DL (ref 2.4–3.5)
GLOBULIN SER-MCNC: 3.1 GM/DL (ref 2.4–3.5)
GLOBULIN SER-MCNC: 3.4 GM/DL (ref 2.4–3.5)
GLOBULIN SER-MCNC: 3.6 GM/DL (ref 2.4–3.5)
GLUCOSE SERPL-MCNC: 100 MG/DL (ref 82–115)
GLUCOSE SERPL-MCNC: 117 MG/DL (ref 82–115)
GLUCOSE SERPL-MCNC: 125 MG/DL (ref 82–115)
GLUCOSE SERPL-MCNC: 128 MG/DL (ref 82–115)
GLUCOSE SERPL-MCNC: 133 MG/DL (ref 82–115)
GLUCOSE SERPL-MCNC: 145 MG/DL (ref 82–115)
GLUCOSE SERPL-MCNC: 153 MG/DL (ref 82–115)
GLUCOSE SERPL-MCNC: 171 MG/DL (ref 82–115)
GLUCOSE SERPL-MCNC: 185 MG/DL (ref 82–115)
GLUCOSE SERPL-MCNC: 218 MG/DL (ref 82–115)
GLUCOSE SERPL-MCNC: 219 MG/DL (ref 82–115)
GLUCOSE SERPL-MCNC: 223 MG/DL (ref 82–115)
GLUCOSE SERPL-MCNC: 227 MG/DL (ref 82–115)
GLUCOSE SERPL-MCNC: 79 MG/DL (ref 82–115)
GLUCOSE SERPL-MCNC: 96 MG/DL (ref 82–115)
GLUCOSE SERPL-MCNC: 97 MG/DL (ref 82–115)
GLUCOSE SERPL-MCNC: 99 MG/DL (ref 82–115)
GLUCOSE UR QL STRIP.AUTO: NEGATIVE MG/DL
GRAM STN SPEC: ABNORMAL
GROUP & RH: NORMAL
HADV DNA NPH QL NAA+NON-PROBE: NOT DETECTED
HBA1C MFR BLD: 4.7 %
HCOV 229E+OC43 RNA NPH QL NAA+PROBE: NOT DETECTED
HCOV HKU1 RNA SPEC QL NAA+PROBE: NOT DETECTED
HCOV NL63 RNA SPEC QL NAA+PROBE: NOT DETECTED
HCOV OC43 RNA SPEC QL NAA+PROBE: NOT DETECTED
HCT VFR BLD AUTO: 17.4 % (ref 42–52)
HCT VFR BLD AUTO: 18.1 % (ref 42–52)
HCT VFR BLD AUTO: 19.5 % (ref 42–52)
HCT VFR BLD AUTO: 20.2 % (ref 42–52)
HCT VFR BLD AUTO: 20.7 % (ref 42–52)
HCT VFR BLD AUTO: 21.4 % (ref 42–52)
HCT VFR BLD AUTO: 21.6 % (ref 42–52)
HCT VFR BLD AUTO: 21.9 % (ref 42–52)
HCT VFR BLD AUTO: 21.9 % (ref 42–52)
HCT VFR BLD AUTO: 22.1 % (ref 42–52)
HCT VFR BLD AUTO: 22.1 % (ref 42–52)
HCT VFR BLD AUTO: 22.2 % (ref 42–52)
HCT VFR BLD AUTO: 22.3 % (ref 42–52)
HCT VFR BLD AUTO: 22.6 % (ref 42–52)
HCT VFR BLD AUTO: 23.2 % (ref 42–52)
HCT VFR BLD AUTO: 23.2 % (ref 42–52)
HCT VFR BLD AUTO: 27.3 % (ref 42–52)
HCT VFR BLD AUTO: 28.9 % (ref 42–52)
HCT VFR BLD AUTO: 32.2 % (ref 42–52)
HCT VFR BLD AUTO: 34 % (ref 42–52)
HCT VFR BLD AUTO: 38.4 % (ref 42–52)
HCT VFR BLD AUTO: 41.7 % (ref 42–52)
HCT VFR BLD AUTO: 42.8 % (ref 42–52)
HCT VFR BLD AUTO: 43.9 % (ref 42–52)
HCT VFR BLD AUTO: 44 % (ref 42–52)
HCT VFR BLD AUTO: 45.3 % (ref 42–52)
HCT VFR BLD AUTO: 45.7 % (ref 42–52)
HCT VFR BLD AUTO: 46.4 % (ref 42–52)
HCT VFR BLD AUTO: 46.5 % (ref 42–52)
HCT VFR BLD AUTO: 48.7 % (ref 42–52)
HDLC SERPL-MCNC: 34 MG/DL (ref 35–60)
HEMATOLOGIST REVIEW: NORMAL
HGB BLD-MCNC: 10 GM/DL (ref 14–18)
HGB BLD-MCNC: 11.4 GM/DL (ref 14–18)
HGB BLD-MCNC: 12 GM/DL (ref 14–18)
HGB BLD-MCNC: 13.7 GM/DL (ref 14–18)
HGB BLD-MCNC: 14.3 GM/DL (ref 14–18)
HGB BLD-MCNC: 14.9 GM/DL (ref 14–18)
HGB BLD-MCNC: 15.1 GM/DL (ref 14–18)
HGB BLD-MCNC: 15.2 G/DL (ref 14–18)
HGB BLD-MCNC: 15.3 GM/DL (ref 14–18)
HGB BLD-MCNC: 15.5 GM/DL (ref 14–18)
HGB BLD-MCNC: 15.5 GM/DL (ref 14–18)
HGB BLD-MCNC: 15.6 GM/DL (ref 14–18)
HGB BLD-MCNC: 16.4 GM/DL (ref 14–18)
HGB BLD-MCNC: 6.2 GM/DL (ref 14–18)
HGB BLD-MCNC: 6.5 GM/DL (ref 14–18)
HGB BLD-MCNC: 6.7 GM/DL (ref 14–18)
HGB BLD-MCNC: 6.9 GM/DL (ref 14–18)
HGB BLD-MCNC: 7.3 GM/DL (ref 14–18)
HGB BLD-MCNC: 7.3 GM/DL (ref 14–18)
HGB BLD-MCNC: 7.4 GM/DL (ref 14–18)
HGB BLD-MCNC: 7.5 GM/DL (ref 14–18)
HGB BLD-MCNC: 7.5 GM/DL (ref 14–18)
HGB BLD-MCNC: 7.6 GM/DL (ref 14–18)
HGB BLD-MCNC: 7.7 GM/DL (ref 14–18)
HGB BLD-MCNC: 7.8 GM/DL (ref 14–18)
HGB BLD-MCNC: 8.1 GM/DL (ref 14–18)
HGB BLD-MCNC: 8.2 GM/DL (ref 14–18)
HGB BLD-MCNC: 9.5 GM/DL (ref 14–18)
HMPV RNA SPEC QL NAA+PROBE: NOT DETECTED
HPIV1 F GENE NPH QL NAA+PROBE: NOT DETECTED
HPIV2 L GENE NPH QL NAA+PROBE: NOT DETECTED
HPIV3 NP GENE NPH QL NAA+PROBE: NOT DETECTED
HPIV4 P GENE NPH QL NAA+PROBE: NOT DETECTED
IGA SERPL-MCNC: 130 MG/DL (ref 101–645)
IGG SERPL-MCNC: 518 MG/DL (ref 240–1822)
IGM SERPL-MCNC: 54 MG/DL (ref 22–240)
IMM GRANULOCYTES # BLD AUTO: 0 X10(3)/MCL (ref 0–0.04)
IMM GRANULOCYTES # BLD AUTO: 0.01 X10(3)/MCL (ref 0–0.04)
IMM GRANULOCYTES # BLD AUTO: 0.02 X10(3)/MCL (ref 0–0.02)
IMM GRANULOCYTES # BLD AUTO: 0.02 X10(3)/MCL (ref 0–0.04)
IMM GRANULOCYTES # BLD AUTO: 0.02 X10(3)/MCL (ref 0–0.04)
IMM GRANULOCYTES # BLD AUTO: 0.03 X10(3)/MCL (ref 0–0.04)
IMM GRANULOCYTES # BLD AUTO: 0.04 X10(3)/MCL (ref 0–0.04)
IMM GRANULOCYTES # BLD AUTO: 0.05 X10(3)/MCL (ref 0–0.02)
IMM GRANULOCYTES # BLD AUTO: 0.05 X10(3)/MCL (ref 0–0.02)
IMM GRANULOCYTES # BLD AUTO: 0.06 X10(3)/MCL (ref 0–0.02)
IMM GRANULOCYTES # BLD AUTO: 0.1 X10(3)/MCL (ref 0–0.02)
IMM GRANULOCYTES NFR BLD AUTO: 0 %
IMM GRANULOCYTES NFR BLD AUTO: 0.1 %
IMM GRANULOCYTES NFR BLD AUTO: 0.1 % (ref 0–0.43)
INDIRECT COOMBS GEL: NORMAL
INR BLD: 1 (ref 0–1.3)
INSTRUMENT WBC (OLG): 10.6 X10(3)/MCL
INSTRUMENT WBC (OLG): 11.2 X10(3)/MCL
INSTRUMENT WBC (OLG): 11.8 X10(3)/MCL
INSTRUMENT WBC (OLG): 11.9 X10(3)/MCL
INSTRUMENT WBC (OLG): 13.3 X10(3)/MCL
INSTRUMENT WBC (OLG): 14.5 X10(3)/MCL
INSTRUMENT WBC (OLG): 20 X10(3)/MCL
INSTRUMENT WBC (OLG): 23.1 X10(3)/MCL
INSTRUMENT WBC (OLG): 38.9 X10(3)/MCL
INSTRUMENT WBC (OLG): 42.2 X10(3)/MCL
INSTRUMENT WBC (OLG): 42.5 X10(3)/MCL
INSTRUMENT WBC (OLG): 51.6 X10(3)/MCL
INSTRUMENT WBC (OLG): 9.9 X10(3)/MCL
KETONES UR QL STRIP.AUTO: NEGATIVE MG/DL
LDH SERPL-CCNC: 120 U/L (ref 125–220)
LDH SERPL-CCNC: 148 UNIT/L (ref 140–271)
LDLC SERPL CALC-MCNC: 78 MG/DL (ref 50–140)
LEGIONELLA AG UR QL: NEGATIVE
LEUKOCYTE ESTERASE UR QL STRIP.AUTO: NEGATIVE UNIT/L
LYMPHOCYTES # BLD AUTO: 16.01 X10(3)/MCL (ref 0.6–4.6)
LYMPHOCYTES # BLD AUTO: 22.85 X10(3)/MCL (ref 0.6–4.6)
LYMPHOCYTES # BLD AUTO: 25.12 X10(3)/MCL (ref 0.6–4.6)
LYMPHOCYTES # BLD AUTO: 28.05 X10(3)/MCL (ref 0.6–4.6)
LYMPHOCYTES # BLD AUTO: 30.92 X10(3)/MCL (ref 0.6–4.6)
LYMPHOCYTES # BLD AUTO: 33.61 X10(3)/MCL (ref 0.6–4.6)
LYMPHOCYTES # BLD AUTO: 34.45 X10(3)/MCL (ref 0.6–4.6)
LYMPHOCYTES # BLD AUTO: 35 X10(3)/MCL (ref 0.6–4.6)
LYMPHOCYTES # BLD AUTO: 41.2 10*3/UL (ref 0.6–4.6)
LYMPHOCYTES # BLD AUTO: 41.97 X10(3)/MCL (ref 0.6–4.6)
LYMPHOCYTES # BLD AUTO: 71.64 X10(3)/MCL (ref 0.6–4.6)
LYMPHOCYTES # BLD AUTO: 72.57 X10(3)/MCL (ref 0.6–4.6)
LYMPHOCYTES # BLD AUTO: 81.52 X10(3)/MCL (ref 0.6–4.6)
LYMPHOCYTES NFR BLD AUTO: 81.2 %
LYMPHOCYTES NFR BLD AUTO: 82.1 %
LYMPHOCYTES NFR BLD AUTO: 83.6 %
LYMPHOCYTES NFR BLD AUTO: 83.7 %
LYMPHOCYTES NFR BLD AUTO: 84.2 %
LYMPHOCYTES NFR BLD AUTO: 84.6 %
LYMPHOCYTES NFR BLD AUTO: 88.6 %
LYMPHOCYTES NFR BLD AUTO: 89.1 %
LYMPHOCYTES NFR BLD AUTO: 89.5 %
LYMPHOCYTES NFR BLD AUTO: 91.4 %
LYMPHOCYTES NFR BLD AUTO: 91.7 %
LYMPHOCYTES NFR BLD AUTO: 92.1 %
LYMPHOCYTES NFR BLD AUTO: 92.8 %
LYMPHOCYTES NFR BLD MANUAL: 10.07 X10(3)/MCL
LYMPHOCYTES NFR BLD MANUAL: 10.42 X10(3)/MCL
LYMPHOCYTES NFR BLD MANUAL: 100 %
LYMPHOCYTES NFR BLD MANUAL: 100 %
LYMPHOCYTES NFR BLD MANUAL: 11.66 X10(3)/MCL
LYMPHOCYTES NFR BLD MANUAL: 12.5 X10(3)/MCL
LYMPHOCYTES NFR BLD MANUAL: 14.21 X10(3)/MCL
LYMPHOCYTES NFR BLD MANUAL: 20 X10(3)/MCL
LYMPHOCYTES NFR BLD MANUAL: 22.41 X10(3)/MCL
LYMPHOCYTES NFR BLD MANUAL: 26.22 X10(3)/MCL
LYMPHOCYTES NFR BLD MANUAL: 26.93 X10(3)/MCL
LYMPHOCYTES NFR BLD MANUAL: 38.9 X10(3)/MCL
LYMPHOCYTES NFR BLD MANUAL: 39.1 X10(3)/MCL
LYMPHOCYTES NFR BLD MANUAL: 40.09 X10(3)/MCL
LYMPHOCYTES NFR BLD MANUAL: 50.05 X10(3)/MCL
LYMPHOCYTES NFR BLD MANUAL: 58 %
LYMPHOCYTES NFR BLD MANUAL: 6.84 X10(3)/MCL
LYMPHOCYTES NFR BLD MANUAL: 88 % (ref 13–40)
LYMPHOCYTES NFR BLD MANUAL: 9.6 X10(3)/MCL
LYMPHOCYTES NFR BLD MANUAL: 92 %
LYMPHOCYTES NFR BLD MANUAL: 93 %
LYMPHOCYTES NFR BLD MANUAL: 93 %
LYMPHOCYTES NFR BLD MANUAL: 94 %
LYMPHOCYTES NFR BLD MANUAL: 95 %
LYMPHOCYTES NFR BLD MANUAL: 95 %
LYMPHOCYTES NFR BLD MANUAL: 95 % (ref 13–40)
LYMPHOCYTES NFR BLD MANUAL: 97 %
LYMPHOCYTES NFR BLD MANUAL: 98 %
LYMPHOCYTES NFR BLD MANUAL: 98 %
LYMPHOCYTES NFR SPEC AUTO: 96 %
M PNEUMO IGA SER-ACNC: NOT DETECTED
MACROCYTES BLD QL SMEAR: ABNORMAL
MANUAL DIFF? (OHS): NO
MCH RBC QN AUTO: 28.9 PG (ref 27–31)
MCH RBC QN AUTO: 29.2 PG (ref 27–31)
MCH RBC QN AUTO: 29.4 PG (ref 27–31)
MCH RBC QN AUTO: 29.7 PG (ref 27–31)
MCH RBC QN AUTO: 29.7 PG (ref 27–31)
MCH RBC QN AUTO: 29.8 PG (ref 27–31)
MCH RBC QN AUTO: 29.9 PG (ref 27–31)
MCH RBC QN AUTO: 30 PG (ref 27–31)
MCH RBC QN AUTO: 30 PG (ref 27–31)
MCH RBC QN AUTO: 30.1 PG (ref 27–31)
MCH RBC QN AUTO: 30.2 PG (ref 27–31)
MCH RBC QN AUTO: 30.4 PG (ref 27–31)
MCH RBC QN AUTO: 30.4 PG (ref 27–31)
MCH RBC QN AUTO: 30.5 PG (ref 27–31)
MCH RBC QN AUTO: 30.5 PG (ref 27–31)
MCH RBC QN AUTO: 30.6 PG (ref 27–31)
MCH RBC QN AUTO: 30.6 PG (ref 27–31)
MCH RBC QN AUTO: 30.7 PG (ref 27–31)
MCH RBC QN AUTO: 31.1 PG (ref 27–31)
MCH RBC QN AUTO: 31.2 PG (ref 27–31)
MCH RBC QN AUTO: 31.2 PG (ref 27–31)
MCH RBC QN AUTO: 31.3 PG (ref 27–31)
MCHC RBC AUTO-ENTMCNC: 33.2 MG/DL (ref 33–36)
MCHC RBC AUTO-ENTMCNC: 33.3 MG/DL (ref 33–36)
MCHC RBC AUTO-ENTMCNC: 33.3 MG/DL (ref 33–36)
MCHC RBC AUTO-ENTMCNC: 33.6 G/DL (ref 33–36)
MCHC RBC AUTO-ENTMCNC: 33.6 GM/DL (ref 33–36)
MCHC RBC AUTO-ENTMCNC: 33.6 MG/DL (ref 33–36)
MCHC RBC AUTO-ENTMCNC: 33.7 MG/DL (ref 33–36)
MCHC RBC AUTO-ENTMCNC: 33.8 MG/DL (ref 33–36)
MCHC RBC AUTO-ENTMCNC: 33.9 MG/DL (ref 33–36)
MCHC RBC AUTO-ENTMCNC: 34.2 MG/DL (ref 33–36)
MCHC RBC AUTO-ENTMCNC: 34.3 MG/DL (ref 33–36)
MCHC RBC AUTO-ENTMCNC: 34.3 MG/DL (ref 33–36)
MCHC RBC AUTO-ENTMCNC: 34.4 MG/DL (ref 33–36)
MCHC RBC AUTO-ENTMCNC: 34.4 MG/DL (ref 33–36)
MCHC RBC AUTO-ENTMCNC: 34.6 MG/DL (ref 33–36)
MCHC RBC AUTO-ENTMCNC: 34.6 MG/DL (ref 33–36)
MCHC RBC AUTO-ENTMCNC: 34.8 MG/DL (ref 33–36)
MCHC RBC AUTO-ENTMCNC: 34.9 MG/DL (ref 33–36)
MCHC RBC AUTO-ENTMCNC: 35 MG/DL (ref 33–36)
MCHC RBC AUTO-ENTMCNC: 35.3 MG/DL (ref 33–36)
MCHC RBC AUTO-ENTMCNC: 35.3 MG/DL (ref 33–36)
MCHC RBC AUTO-ENTMCNC: 35.4 MG/DL (ref 33–36)
MCHC RBC AUTO-ENTMCNC: 35.6 MG/DL (ref 33–36)
MCHC RBC AUTO-ENTMCNC: 35.7 MG/DL (ref 33–36)
MCHC RBC AUTO-ENTMCNC: 35.9 MG/DL (ref 33–36)
MCHC RBC AUTO-ENTMCNC: 36.9 MG/DL (ref 33–36)
MCV RBC AUTO: 84.6 FL (ref 80–94)
MCV RBC AUTO: 84.7 FL (ref 80–94)
MCV RBC AUTO: 84.8 FL (ref 80–94)
MCV RBC AUTO: 84.9 FL (ref 80–94)
MCV RBC AUTO: 85.6 FL (ref 80–94)
MCV RBC AUTO: 85.9 FL (ref 80–94)
MCV RBC AUTO: 86.3 FL (ref 80–94)
MCV RBC AUTO: 86.7 FL (ref 80–94)
MCV RBC AUTO: 87.2 FL (ref 80–94)
MCV RBC AUTO: 87.3 FL (ref 80–94)
MCV RBC AUTO: 87.5 FL (ref 80–94)
MCV RBC AUTO: 87.6 FL (ref 80–94)
MCV RBC AUTO: 88 FL (ref 80–94)
MCV RBC AUTO: 88 FL (ref 80–94)
MCV RBC AUTO: 88.1 FL (ref 80–94)
MCV RBC AUTO: 88.2 FL (ref 80–94)
MCV RBC AUTO: 88.3 FL (ref 80–94)
MCV RBC AUTO: 88.7 FL (ref 80–94)
MCV RBC AUTO: 88.8 FL (ref 80–94)
MCV RBC AUTO: 88.9 FL (ref 80–94)
MCV RBC AUTO: 89 FL (ref 80–94)
MCV RBC AUTO: 89.1 FL (ref 80–94)
MCV RBC AUTO: 89.4 FL (ref 80–94)
MCV RBC AUTO: 89.5 FL (ref 80–94)
MCV RBC AUTO: 89.6 FL (ref 80–94)
MICROCYTES BLD QL SMEAR: ABNORMAL
MONOCYTES # BLD AUTO: 0.93 X10(3)/MCL (ref 0.1–1.3)
MONOCYTES # BLD AUTO: 1.02 X10(3)/MCL (ref 0.1–1.3)
MONOCYTES # BLD AUTO: 1.09 X10(3)/MCL (ref 0.1–1.3)
MONOCYTES # BLD AUTO: 1.11 X10(3)/MCL (ref 0.1–1.3)
MONOCYTES # BLD AUTO: 1.22 X10(3)/MCL (ref 0.1–1.3)
MONOCYTES # BLD AUTO: 1.36 X10(3)/MCL (ref 0.1–1.3)
MONOCYTES # BLD AUTO: 1.5 X10(3)/MCL (ref 0.1–1.3)
MONOCYTES # BLD AUTO: 1.72 X10(3)/MCL (ref 0.1–1.3)
MONOCYTES # BLD AUTO: 1.8 X10(3)/MCL (ref 0.1–1.3)
MONOCYTES # BLD AUTO: 1.84 X10(3)/MCL (ref 0.1–1.3)
MONOCYTES # BLD AUTO: 2.04 X10(3)/MCL (ref 0.1–1.3)
MONOCYTES # BLD AUTO: 3.6 10*3/UL (ref 0.1–1.3)
MONOCYTES # BLD AUTO: 3.8 X10(3)/MCL (ref 0.1–1.3)
MONOCYTES %: 1
MONOCYTES NFR BLD AUTO: 1.6 %
MONOCYTES NFR BLD AUTO: 2 %
MONOCYTES NFR BLD AUTO: 2.4 %
MONOCYTES NFR BLD AUTO: 2.6 %
MONOCYTES NFR BLD AUTO: 2.8 %
MONOCYTES NFR BLD AUTO: 3.5 %
MONOCYTES NFR BLD AUTO: 3.5 %
MONOCYTES NFR BLD AUTO: 3.6 %
MONOCYTES NFR BLD AUTO: 4.7 %
MONOCYTES NFR BLD AUTO: 5.5 %
MONOCYTES NFR BLD AUTO: 7.4 %
MONOCYTES NFR BLD AUTO: 7.8 %
MONOCYTES NFR BLD AUTO: 9 %
MONOCYTES NFR BLD MANUAL: 0.24 X10(3)/MCL (ref 0.1–1.3)
MONOCYTES NFR BLD MANUAL: 0.3 X10(3)/MCL (ref 0.1–1.3)
MONOCYTES NFR BLD MANUAL: 0.31 X10(3)/MCL (ref 0.1–1.3)
MONOCYTES NFR BLD MANUAL: 0.46 X10(3)/MCL (ref 0.1–1.3)
MONOCYTES NFR BLD MANUAL: 0.53 X10(3)/MCL (ref 0.1–1.3)
MONOCYTES NFR BLD MANUAL: 0.56 X10(3)/MCL (ref 0.1–1.3)
MONOCYTES NFR BLD MANUAL: 0.71 X10(3)/MCL (ref 0.1–1.3)
MONOCYTES NFR BLD MANUAL: 0.8 X10(3)/MCL (ref 0.1–1.3)
MONOCYTES NFR BLD MANUAL: 1 % (ref 2–11)
MONOCYTES NFR BLD MANUAL: 1.38 X10(3)/MCL (ref 0.1–1.3)
MONOCYTES NFR BLD MANUAL: 2 %
MONOCYTES NFR BLD MANUAL: 2 %
MONOCYTES NFR BLD MANUAL: 3 %
MONOCYTES NFR BLD MANUAL: 5 %
MONOCYTES NFR BLD MANUAL: 5 %
MONOCYTES NFR BLD MANUAL: 5 % (ref 2–11)
MONOCYTES NFR BLD MANUAL: 6 %
MONOCYTES NFR BLD MANUAL: 6 %
MRSA PCR SCRN (OHS): NOT DETECTED
MYELOCYTES NFR BLD MANUAL: 1 %
NEUTROPHILS # BLD AUTO: 1.4 X10(3)/MCL (ref 2.1–9.2)
NEUTROPHILS # BLD AUTO: 1.4 X10(3)/MCL (ref 2.1–9.2)
NEUTROPHILS # BLD AUTO: 1.9 X10(3)/MCL (ref 2.1–9.2)
NEUTROPHILS # BLD AUTO: 2.8 X10(3)/MCL (ref 2.1–9.2)
NEUTROPHILS # BLD AUTO: 3.1 X10(3)/MCL (ref 2.1–9.2)
NEUTROPHILS # BLD AUTO: 3.4 X10(3)/MCL (ref 2.1–9.2)
NEUTROPHILS # BLD AUTO: 3.5 X10(3)/MCL (ref 2.1–9.2)
NEUTROPHILS # BLD AUTO: 3.7 X10(3)/MCL (ref 2.1–9.2)
NEUTROPHILS # BLD AUTO: 4 10*3/UL (ref 2.1–9.2)
NEUTROPHILS # BLD AUTO: 4 X10(3)/MCL (ref 2.1–9.2)
NEUTROPHILS # BLD AUTO: 4.3 X10(3)/MCL (ref 2.1–9.2)
NEUTROPHILS # BLD AUTO: 4.5 X10(3)/MCL (ref 2.1–9.2)
NEUTROPHILS # BLD AUTO: 5 X10(3)/MCL (ref 2.1–9.2)
NEUTROPHILS NFR BLD AUTO: 11.8 %
NEUTROPHILS NFR BLD AUTO: 14.6 %
NEUTROPHILS NFR BLD AUTO: 3.6 %
NEUTROPHILS NFR BLD AUTO: 4.6 %
NEUTROPHILS NFR BLD AUTO: 5.1 %
NEUTROPHILS NFR BLD AUTO: 5.5 %
NEUTROPHILS NFR BLD AUTO: 7.1 %
NEUTROPHILS NFR BLD AUTO: 7.3 %
NEUTROPHILS NFR BLD AUTO: 7.4 %
NEUTROPHILS NFR BLD AUTO: 7.5 %
NEUTROPHILS NFR BLD AUTO: 8 %
NEUTROPHILS NFR BLD AUTO: 8.2 %
NEUTROPHILS NFR BLD AUTO: 9.2 %
NEUTROPHILS NFR BLD MANUAL: 0 %
NEUTROPHILS NFR BLD MANUAL: 0 %
NEUTROPHILS NFR BLD MANUAL: 1 %
NEUTROPHILS NFR BLD MANUAL: 11 % (ref 47–80)
NEUTROPHILS NFR BLD MANUAL: 2 %
NEUTROPHILS NFR BLD MANUAL: 2 %
NEUTROPHILS NFR BLD MANUAL: 32 %
NEUTROPHILS NFR BLD MANUAL: 5 %
NEUTROPHILS NFR BLD MANUAL: 8 %
NITRITE UR QL STRIP.AUTO: NEGATIVE
NRBC BLD AUTO-RTO: 0 %
NRBC BLD MANUAL-RTO: 1 %
OSMOLALITY SERPL: 263 MOSM/KG (ref 280–300)
OVALOCYTES (OLG): ABNORMAL
OVALOCYTES (OLG): ABNORMAL
PATH REV: NORMAL
PH UR STRIP.AUTO: 5 [PH]
PLASMA CELLS BLD QL SMEAR: 3 %
PLATELET # BLD AUTO: 1 X10(3)/MCL (ref 130–400)
PLATELET # BLD AUTO: 100 X10(3)/MCL (ref 130–400)
PLATELET # BLD AUTO: 11 X10(3)/MCL (ref 130–400)
PLATELET # BLD AUTO: 111 X10(3)/MCL (ref 130–400)
PLATELET # BLD AUTO: 12 X10(3)/MCL (ref 130–400)
PLATELET # BLD AUTO: 12 X10(3)/MCL (ref 130–400)
PLATELET # BLD AUTO: 128 X10(3)/MCL (ref 130–400)
PLATELET # BLD AUTO: 14 X10(3)/MCL (ref 130–400)
PLATELET # BLD AUTO: 141 10*3/UL (ref 130–400)
PLATELET # BLD AUTO: 15 X10(3)/MCL (ref 130–400)
PLATELET # BLD AUTO: 17 X10(3)/MCL (ref 130–400)
PLATELET # BLD AUTO: 19 X10(3)/MCL (ref 130–400)
PLATELET # BLD AUTO: 2 X10(3)/MCL (ref 130–400)
PLATELET # BLD AUTO: 29 X10(3)/MCL (ref 130–400)
PLATELET # BLD AUTO: 30 X10(3)/MCL (ref 130–400)
PLATELET # BLD AUTO: 32 X10(3)/MCL (ref 130–400)
PLATELET # BLD AUTO: 33 X10(3)/MCL (ref 130–400)
PLATELET # BLD AUTO: 34 X10(3)/MCL (ref 130–400)
PLATELET # BLD AUTO: 4 X10(3)/MCL (ref 130–400)
PLATELET # BLD AUTO: 44 X10(3)/MCL (ref 130–400)
PLATELET # BLD AUTO: 62 X10(3)/MCL (ref 130–400)
PLATELET # BLD AUTO: 68 X10(3)/MCL (ref 130–400)
PLATELET # BLD AUTO: 7 X10(3)/MCL (ref 130–400)
PLATELET # BLD AUTO: 7 X10(3)/MCL (ref 130–400)
PLATELET # BLD AUTO: 78 X10(3)/MCL (ref 130–400)
PLATELET # BLD AUTO: 8 X10(3)/MCL (ref 130–400)
PLATELET # BLD AUTO: 9 X10(3)/MCL (ref 130–400)
PLATELET # BLD AUTO: 99 X10(3)/MCL (ref 130–400)
PLATELET # BLD EST: ABNORMAL 10*3/UL
PLATELET # BLD EST: ADEQUATE 10*3/UL
PLATELET # BLD EST: NORMAL 10*3/UL
PMV BLD AUTO: 10.1 FL (ref 7.4–10.4)
PMV BLD AUTO: 10.1 FL (ref 7.4–10.4)
PMV BLD AUTO: 10.2 FL (ref 7.4–10.4)
PMV BLD AUTO: 10.2 FL (ref 9.4–12.4)
PMV BLD AUTO: 10.3 FL (ref 7.4–10.4)
PMV BLD AUTO: 10.5 FL (ref 9.4–12.4)
PMV BLD AUTO: 10.6 FL (ref 7.4–10.4)
PMV BLD AUTO: 10.6 FL (ref 9.4–12.4)
PMV BLD AUTO: 11 FL (ref 7.4–10.4)
PMV BLD AUTO: 11.1 FL (ref 7.4–10.4)
PMV BLD AUTO: 11.2 FL (ref 7.4–10.4)
PMV BLD AUTO: 11.2 FL (ref 9.4–12.4)
PMV BLD AUTO: 11.3 FL (ref 7.4–10.4)
PMV BLD AUTO: 11.3 FL (ref 9.4–12.4)
PMV BLD AUTO: 11.3 FL (ref 9.4–12.4)
PMV BLD AUTO: 11.4 FL (ref 7.4–10.4)
PMV BLD AUTO: 12 FL (ref 7.4–10.4)
PMV BLD AUTO: 12.1 FL (ref 7.4–10.4)
PMV BLD AUTO: 12.1 FL (ref 7.4–10.4)
PMV BLD AUTO: 12.6 FL (ref 7.4–10.4)
PMV BLD AUTO: 9.8 FL (ref 7.4–10.4)
PMV BLD AUTO: 9.9 FL (ref 7.4–10.4)
PMV BLD AUTO: ABNORMAL FL
POCT GLUCOSE: 110 MG/DL (ref 70–110)
POCT GLUCOSE: 229 MG/DL (ref 70–110)
POIKILOCYTOSIS BLD QL SMEAR: ABNORMAL
POTASSIUM SERPL-SCNC: 4 MMOL/L (ref 3.5–5.1)
POTASSIUM SERPL-SCNC: 4.1 MMOL/L (ref 3.5–5.1)
POTASSIUM SERPL-SCNC: 4.3 MMOL/L (ref 3.5–5.1)
POTASSIUM SERPL-SCNC: 4.3 MMOL/L (ref 3.5–5.1)
POTASSIUM SERPL-SCNC: 4.4 MMOL/L (ref 3.5–5.1)
POTASSIUM SERPL-SCNC: 4.5 MMOL/L (ref 3.5–5.1)
POTASSIUM SERPL-SCNC: 4.6 MMOL/L (ref 3.5–5.1)
POTASSIUM SERPL-SCNC: 4.7 MMOL/L (ref 3.5–5.1)
PROLYMPHOCYTES # BLD MANUAL: 3 %
PROT SERPL-MCNC: 4.8 GM/DL (ref 5.8–7.6)
PROT SERPL-MCNC: 4.8 GM/DL (ref 5.8–7.6)
PROT SERPL-MCNC: 5.1 GM/DL (ref 5.8–7.6)
PROT SERPL-MCNC: 5.3 GM/DL (ref 5.8–7.6)
PROT SERPL-MCNC: 5.5 GM/DL (ref 5.8–7.6)
PROT SERPL-MCNC: 5.6 GM/DL (ref 5.8–7.6)
PROT SERPL-MCNC: 5.7 GM/DL (ref 5.8–7.6)
PROT SERPL-MCNC: 6 GM/DL (ref 5.8–7.6)
PROT SERPL-MCNC: 6 GM/DL (ref 5.8–7.6)
PROT SERPL-MCNC: 6.1 GM/DL (ref 5.8–7.6)
PROT SERPL-MCNC: 6.2 GM/DL (ref 5.8–7.6)
PROT SERPL-MCNC: 6.3 GM/DL (ref 5.8–7.6)
PROT SERPL-MCNC: 6.3 GM/DL (ref 5.8–7.6)
PROT SERPL-MCNC: 6.4 GM/DL (ref 5.8–7.6)
PROT SERPL-MCNC: 6.4 GM/DL (ref 5.8–7.6)
PROT SERPL-MCNC: 7.4 GM/DL (ref 5.8–7.6)
PROT UR QL STRIP.AUTO: NEGATIVE MG/DL
PROTHROMBIN TIME: 13.1 SECONDS (ref 12.5–14.5)
RBC # BLD AUTO: 2.05 X10(6)/MCL (ref 4.7–6.1)
RBC # BLD AUTO: 2.14 X10(6)/MCL (ref 4.7–6.1)
RBC # BLD AUTO: 2.18 X10(6)/MCL (ref 4.7–6.1)
RBC # BLD AUTO: 2.36 X10(6)/MCL (ref 4.7–6.1)
RBC # BLD AUTO: 2.4 X10(6)/MCL (ref 4.7–6.1)
RBC # BLD AUTO: 2.43 X10(6)/MCL (ref 4.7–6.1)
RBC # BLD AUTO: 2.48 X10(6)/MCL (ref 4.7–6.1)
RBC # BLD AUTO: 2.48 X10(6)/MCL (ref 4.7–6.1)
RBC # BLD AUTO: 2.49 X10(6)/MCL (ref 4.7–6.1)
RBC # BLD AUTO: 2.49 X10(6)/MCL (ref 4.7–6.1)
RBC # BLD AUTO: 2.51 X10(6)/MCL (ref 4.7–6.1)
RBC # BLD AUTO: 2.54 X10(6)/MCL (ref 4.7–6.1)
RBC # BLD AUTO: 2.62 X10(6)/MCL (ref 4.7–6.1)
RBC # BLD AUTO: 2.63 X10(6)/MCL (ref 4.7–6.1)
RBC # BLD AUTO: 2.66 X10(6)/MCL (ref 4.7–6.1)
RBC # BLD AUTO: 2.7 X10(6)/MCL (ref 4.7–6.1)
RBC # BLD AUTO: 3.09 X10(6)/MCL (ref 4.7–6.1)
RBC # BLD AUTO: 3.26 X10(6)/MCL (ref 4.7–6.1)
RBC # BLD AUTO: 3.65 X10(6)/MCL (ref 4.7–6.1)
RBC # BLD AUTO: 3.85 X10(6)/MCL (ref 4.7–6.1)
RBC # BLD AUTO: 4.4 X10(6)/MCL (ref 4.7–6.1)
RBC # BLD AUTO: 4.68 X10(6)/MCL (ref 4.7–6.1)
RBC # BLD AUTO: 4.89 X10(6)/MCL (ref 4.7–6.1)
RBC # BLD AUTO: 5 X10(6)/MCL (ref 4.7–6.1)
RBC # BLD AUTO: 5.01 X10(6)/MCL (ref 4.7–6.1)
RBC # BLD AUTO: 5.06 10*6/UL (ref 4.7–6.1)
RBC # BLD AUTO: 5.15 X10(6)/MCL (ref 4.7–6.1)
RBC # BLD AUTO: 5.19 X10(6)/MCL (ref 4.7–6.1)
RBC # BLD AUTO: 5.22 X10(6)/MCL (ref 4.7–6.1)
RBC # BLD AUTO: 5.49 X10(6)/MCL (ref 4.7–6.1)
RBC #/AREA URNS AUTO: <5 /HPF
RBC MORPH BLD: ABNORMAL
RBC MORPH BLD: NORMAL
RBC UR QL AUTO: NEGATIVE UNIT/L
RHINOVIRUS RNA SPEC NAA+PROBE: NOT DETECTED
RSV A 5' UTR RNA NPH QL NAA+PROBE: NOT DETECTED
SARS-COV-2 RDRP RESP QL NAA+PROBE: NEGATIVE
SARS-COV-2 RNA RESP QL NAA+PROBE: NOT DETECTED
SARS-COV-2 RNA RESP QL NAA+PROBE: NOT DETECTED
SMUDGE CELL (OLG): ABNORMAL
SODIUM SERPL-SCNC: 119 MMOL/L (ref 136–145)
SODIUM SERPL-SCNC: 121 MMOL/L (ref 136–145)
SODIUM SERPL-SCNC: 123 MMOL/L (ref 136–145)
SODIUM SERPL-SCNC: 124 MMOL/L (ref 136–145)
SODIUM SERPL-SCNC: 124 MMOL/L (ref 136–145)
SODIUM SERPL-SCNC: 129 MMOL/L (ref 136–145)
SODIUM SERPL-SCNC: 131 MMOL/L (ref 136–145)
SODIUM SERPL-SCNC: 133 MMOL/L (ref 136–145)
SODIUM SERPL-SCNC: 135 MMOL/L (ref 136–145)
SODIUM SERPL-SCNC: 135 MMOL/L (ref 136–145)
SODIUM SERPL-SCNC: 136 MMOL/L (ref 136–145)
SODIUM SERPL-SCNC: 136 MMOL/L (ref 136–145)
SODIUM SERPL-SCNC: 140 MMOL/L (ref 136–145)
SODIUM SERPL-SCNC: 141 MMOL/L (ref 136–145)
SODIUM SERPL-SCNC: 141 MMOL/L (ref 136–145)
SODIUM SERPL-SCNC: 142 MMOL/L (ref 136–145)
SODIUM SERPL-SCNC: 142 MMOL/L (ref 136–145)
SP GR UR STRIP.AUTO: 1.01 (ref 1–1.03)
SQUAMOUS #/AREA URNS AUTO: <5 /HPF
STOMATOCYTES (OLG): ABNORMAL
TARGETS BLD QL SMEAR: ABNORMAL
TRIGL SERPL-MCNC: 98 MG/DL (ref 34–140)
TROPONIN I SERPL-MCNC: <0.01 NG/ML (ref 0–0.04)
TSH SERPL-ACNC: 0.73 UIU/ML (ref 0.35–4.94)
UNIT NUMBER: NORMAL
UROBILINOGEN UR STRIP-ACNC: 0.2 MG/DL
VLDLC SERPL CALC-MCNC: 20 MG/DL
WBC # SPEC AUTO: 10.6 X10(3)/MCL (ref 4.5–11.5)
WBC # SPEC AUTO: 11.2 X10(3)/MCL (ref 4.5–11.5)
WBC # SPEC AUTO: 11.9 X10(3)/MCL (ref 4.5–11.5)
WBC # SPEC AUTO: 12.2 X10(3)/MCL (ref 4.5–11.5)
WBC # SPEC AUTO: 13.3 X10(3)/MCL (ref 4.5–11.5)
WBC # SPEC AUTO: 14.5 X10(3)/MCL (ref 4.5–11.5)
WBC # SPEC AUTO: 19.1 X10(3)/MCL (ref 4.5–11.5)
WBC # SPEC AUTO: 19.6 X10(3)/MCL (ref 4.5–11.5)
WBC # SPEC AUTO: 21 X10(3)/MCL (ref 4.5–11.5)
WBC # SPEC AUTO: 23.1 X10(3)/MCL (ref 4.5–11.5)
WBC # SPEC AUTO: 25.8 X10(3)/MCL (ref 4.5–11.5)
WBC # SPEC AUTO: 27.6 X10(3)/MCL (ref 4.5–11.5)
WBC # SPEC AUTO: 30.6 X10(3)/MCL (ref 4.5–11.5)
WBC # SPEC AUTO: 31 X10(3)/MCL (ref 4.5–11.5)
WBC # SPEC AUTO: 31.2 X10(3)/MCL (ref 4.5–11.5)
WBC # SPEC AUTO: 33.3 X10(3)/MCL (ref 4.5–11.5)
WBC # SPEC AUTO: 36.6 X10(3)/MCL (ref 4.5–11.5)
WBC # SPEC AUTO: 36.8 X10(3)/MCL (ref 4.5–11.5)
WBC # SPEC AUTO: 38.7 X10(3)/MCL (ref 4.5–11.5)
WBC # SPEC AUTO: 38.9 X10(3)/MCL (ref 4.5–11.5)
WBC # SPEC AUTO: 42.2 X10(3)/MCL (ref 4.5–11.5)
WBC # SPEC AUTO: 42.4 X10(3)/MCL (ref 4.5–11.5)
WBC # SPEC AUTO: 42.7 X10(3)/MCL (ref 4.5–11.5)
WBC # SPEC AUTO: 46.9 X10(3)/MCL (ref 4.5–11.5)
WBC # SPEC AUTO: 49.2 10*3/UL (ref 4.5–11.5)
WBC # SPEC AUTO: 51.6 X10(3)/MCL (ref 4.5–11.5)
WBC # SPEC AUTO: 77.2 X10(3)/MCL (ref 4.5–11.5)
WBC # SPEC AUTO: 78.8 X10(3)/MCL (ref 4.5–11.5)
WBC # SPEC AUTO: 88.9 X10(3)/MCL (ref 4.5–11.5)
WBC # SPEC AUTO: 9.9 X10(3)/MCL (ref 4.5–11.5)
WBC #/AREA URNS AUTO: <5 /HPF

## 2022-01-01 PROCEDURE — 85025 COMPLETE CBC W/AUTO DIFF WBC: CPT | Performed by: STUDENT IN AN ORGANIZED HEALTH CARE EDUCATION/TRAINING PROGRAM

## 2022-01-01 PROCEDURE — 99233 PR SUBSEQUENT HOSPITAL CARE,LEVL III: ICD-10-PCS | Mod: ,,, | Performed by: INTERNAL MEDICINE

## 2022-01-01 PROCEDURE — 85025 COMPLETE CBC W/AUTO DIFF WBC: CPT | Performed by: INTERNAL MEDICINE

## 2022-01-01 PROCEDURE — 63600175 PHARM REV CODE 636 W HCPCS: Performed by: INTERNAL MEDICINE

## 2022-01-01 PROCEDURE — P9035 PLATELET PHERES LEUKOREDUCED: HCPCS | Performed by: INTERNAL MEDICINE

## 2022-01-01 PROCEDURE — 25500020 PHARM REV CODE 255: Performed by: INTERNAL MEDICINE

## 2022-01-01 PROCEDURE — 25000003 PHARM REV CODE 250: Performed by: SURGERY

## 2022-01-01 PROCEDURE — 25000003 PHARM REV CODE 250: Performed by: INTERNAL MEDICINE

## 2022-01-01 PROCEDURE — 36415 COLL VENOUS BLD VENIPUNCTURE: CPT

## 2022-01-01 PROCEDURE — 83930 ASSAY OF BLOOD OSMOLALITY: CPT | Performed by: INTERNAL MEDICINE

## 2022-01-01 PROCEDURE — 86901 BLOOD TYPING SEROLOGIC RH(D): CPT | Performed by: INTERNAL MEDICINE

## 2022-01-01 PROCEDURE — 25000003 PHARM REV CODE 250: Performed by: EMERGENCY MEDICINE

## 2022-01-01 PROCEDURE — 27000221 HC OXYGEN, UP TO 24 HOURS

## 2022-01-01 PROCEDURE — 99232 SBSQ HOSP IP/OBS MODERATE 35: CPT | Mod: ,,, | Performed by: INTERNAL MEDICINE

## 2022-01-01 PROCEDURE — 87635 SARS-COV-2 COVID-19 AMP PRB: CPT | Performed by: INTERNAL MEDICINE

## 2022-01-01 PROCEDURE — 87633 RESP VIRUS 12-25 TARGETS: CPT | Performed by: INTERNAL MEDICINE

## 2022-01-01 PROCEDURE — 85027 COMPLETE CBC AUTOMATED: CPT | Performed by: INTERNAL MEDICINE

## 2022-01-01 PROCEDURE — 99999 PR PBB SHADOW E&M-EST. PATIENT-LVL III: CPT | Mod: PBBFAC,,, | Performed by: NURSE PRACTITIONER

## 2022-01-01 PROCEDURE — 99233 PR SUBSEQUENT HOSPITAL CARE,LEVL III: ICD-10-PCS | Mod: GC,,, | Performed by: INTERNAL MEDICINE

## 2022-01-01 PROCEDURE — 80053 COMPREHEN METABOLIC PANEL: CPT | Performed by: ANESTHESIOLOGY

## 2022-01-01 PROCEDURE — 36000708 HC OR TIME LEV III 1ST 15 MIN: Performed by: SURGERY

## 2022-01-01 PROCEDURE — 36430 TRANSFUSION BLD/BLD COMPNT: CPT

## 2022-01-01 PROCEDURE — 87635 SARS-COV-2 COVID-19 AMP PRB: CPT | Performed by: GENERAL PRACTICE

## 2022-01-01 PROCEDURE — 85025 COMPLETE CBC W/AUTO DIFF WBC: CPT

## 2022-01-01 PROCEDURE — 11000001 HC ACUTE MED/SURG PRIVATE ROOM

## 2022-01-01 PROCEDURE — 86920 COMPATIBILITY TEST SPIN: CPT | Performed by: ANESTHESIOLOGY

## 2022-01-01 PROCEDURE — 27201423 OPTIME MED/SURG SUP & DEVICES STERILE SUPPLY: Performed by: SURGERY

## 2022-01-01 PROCEDURE — 25000003 PHARM REV CODE 250: Performed by: NURSE PRACTITIONER

## 2022-01-01 PROCEDURE — 99223 PR INITIAL HOSPITAL CARE,LEVL III: ICD-10-PCS | Mod: ,,, | Performed by: INTERNAL MEDICINE

## 2022-01-01 PROCEDURE — 83036 HEMOGLOBIN GLYCOSYLATED A1C: CPT | Performed by: STUDENT IN AN ORGANIZED HEALTH CARE EDUCATION/TRAINING PROGRAM

## 2022-01-01 PROCEDURE — 96366 THER/PROPH/DIAG IV INF ADDON: CPT

## 2022-01-01 PROCEDURE — 94761 N-INVAS EAR/PLS OXIMETRY MLT: CPT

## 2022-01-01 PROCEDURE — 25000003 PHARM REV CODE 250: Performed by: STUDENT IN AN ORGANIZED HEALTH CARE EDUCATION/TRAINING PROGRAM

## 2022-01-01 PROCEDURE — 99233 SBSQ HOSP IP/OBS HIGH 50: CPT | Mod: ,,, | Performed by: INTERNAL MEDICINE

## 2022-01-01 PROCEDURE — 87070 CULTURE OTHR SPECIMN AEROBIC: CPT | Performed by: INTERNAL MEDICINE

## 2022-01-01 PROCEDURE — 99223 1ST HOSP IP/OBS HIGH 75: CPT | Mod: ,,, | Performed by: SURGERY

## 2022-01-01 PROCEDURE — 80053 COMPREHEN METABOLIC PANEL: CPT | Performed by: INTERNAL MEDICINE

## 2022-01-01 PROCEDURE — 96365 THER/PROPH/DIAG IV INF INIT: CPT

## 2022-01-01 PROCEDURE — 85007 BL SMEAR W/DIFF WBC COUNT: CPT | Performed by: ANESTHESIOLOGY

## 2022-01-01 PROCEDURE — 99999 PR PBB SHADOW E&M-EST. PATIENT-LVL III: ICD-10-PCS | Mod: PBBFAC,,, | Performed by: NURSE PRACTITIONER

## 2022-01-01 PROCEDURE — 63600175 PHARM REV CODE 636 W HCPCS: Performed by: NURSE PRACTITIONER

## 2022-01-01 PROCEDURE — 63600175 PHARM REV CODE 636 W HCPCS: Performed by: PATHOLOGY

## 2022-01-01 PROCEDURE — 25000003 PHARM REV CODE 250

## 2022-01-01 PROCEDURE — 71000033 HC RECOVERY, INTIAL HOUR: Performed by: SURGERY

## 2022-01-01 PROCEDURE — 99223 1ST HOSP IP/OBS HIGH 75: CPT | Mod: ,,, | Performed by: NEUROLOGICAL SURGERY

## 2022-01-01 PROCEDURE — 85027 COMPLETE CBC AUTOMATED: CPT

## 2022-01-01 PROCEDURE — 99232 SBSQ HOSP IP/OBS MODERATE 35: CPT | Mod: ,,, | Performed by: NURSE PRACTITIONER

## 2022-01-01 PROCEDURE — 96375 TX/PRO/DX INJ NEW DRUG ADDON: CPT

## 2022-01-01 PROCEDURE — 20000000 HC ICU ROOM

## 2022-01-01 PROCEDURE — 96374 THER/PROPH/DIAG INJ IV PUSH: CPT

## 2022-01-01 PROCEDURE — A4216 STERILE WATER/SALINE, 10 ML: HCPCS | Performed by: SURGERY

## 2022-01-01 PROCEDURE — 99213 OFFICE O/P EST LOW 20 MIN: CPT | Mod: PBBFAC | Performed by: NURSE PRACTITIONER

## 2022-01-01 PROCEDURE — 93010 EKG 12-LEAD: ICD-10-PCS | Mod: ,,, | Performed by: INTERNAL MEDICINE

## 2022-01-01 PROCEDURE — 25000003 PHARM REV CODE 250: Performed by: NURSE ANESTHETIST, CERTIFIED REGISTERED

## 2022-01-01 PROCEDURE — 87040 BLOOD CULTURE FOR BACTERIA: CPT | Performed by: GENERAL PRACTICE

## 2022-01-01 PROCEDURE — 87449 NOS EACH ORGANISM AG IA: CPT | Performed by: NURSE PRACTITIONER

## 2022-01-01 PROCEDURE — 86850 RBC ANTIBODY SCREEN: CPT | Performed by: EMERGENCY MEDICINE

## 2022-01-01 PROCEDURE — 88264 CHROMOSOME ANALYSIS 20-25: CPT

## 2022-01-01 PROCEDURE — 84443 ASSAY THYROID STIM HORMONE: CPT | Performed by: STUDENT IN AN ORGANIZED HEALTH CARE EDUCATION/TRAINING PROGRAM

## 2022-01-01 PROCEDURE — 86920 COMPATIBILITY TEST SPIN: CPT | Mod: 59 | Performed by: NURSE PRACTITIONER

## 2022-01-01 PROCEDURE — P9016 RBC LEUKOCYTES REDUCED: HCPCS | Performed by: NURSE PRACTITIONER

## 2022-01-01 PROCEDURE — 38570 PR LAP,LYMPH NODE BX: ICD-10-PCS | Mod: ,,, | Performed by: SURGERY

## 2022-01-01 PROCEDURE — 88185 FLOWCYTOMETRY/TC ADD-ON: CPT | Performed by: PATHOLOGY

## 2022-01-01 PROCEDURE — 36415 COLL VENOUS BLD VENIPUNCTURE: CPT | Performed by: INTERNAL MEDICINE

## 2022-01-01 PROCEDURE — 99223 PR INITIAL HOSPITAL CARE,LEVL III: ICD-10-PCS | Mod: ,,, | Performed by: SURGERY

## 2022-01-01 PROCEDURE — 80053 COMPREHEN METABOLIC PANEL: CPT | Performed by: EMERGENCY MEDICINE

## 2022-01-01 PROCEDURE — 99223 1ST HOSP IP/OBS HIGH 75: CPT | Mod: FS,,, | Performed by: GENERAL PRACTICE

## 2022-01-01 PROCEDURE — 80053 COMPREHEN METABOLIC PANEL: CPT

## 2022-01-01 PROCEDURE — 99232 PR SUBSEQUENT HOSPITAL CARE,LEVL II: ICD-10-PCS | Mod: ,,, | Performed by: INTERNAL MEDICINE

## 2022-01-01 PROCEDURE — 99223 1ST HOSP IP/OBS HIGH 75: CPT | Mod: ,,, | Performed by: INTERNAL MEDICINE

## 2022-01-01 PROCEDURE — 99291 CRITICAL CARE FIRST HOUR: CPT | Mod: 25

## 2022-01-01 PROCEDURE — 36000709 HC OR TIME LEV III EA ADD 15 MIN: Performed by: SURGERY

## 2022-01-01 PROCEDURE — 86920 COMPATIBILITY TEST SPIN: CPT | Performed by: STUDENT IN AN ORGANIZED HEALTH CARE EDUCATION/TRAINING PROGRAM

## 2022-01-01 PROCEDURE — 38220 DX BONE MARROW ASPIRATIONS: CPT | Performed by: PATHOLOGY

## 2022-01-01 PROCEDURE — 86920 COMPATIBILITY TEST SPIN: CPT | Performed by: INTERNAL MEDICINE

## 2022-01-01 PROCEDURE — 36415 COLL VENOUS BLD VENIPUNCTURE: CPT | Performed by: EMERGENCY MEDICINE

## 2022-01-01 PROCEDURE — 87040 BLOOD CULTURE FOR BACTERIA: CPT | Performed by: EMERGENCY MEDICINE

## 2022-01-01 PROCEDURE — 85025 COMPLETE CBC W/AUTO DIFF WBC: CPT | Performed by: EMERGENCY MEDICINE

## 2022-01-01 PROCEDURE — 99232 PR SUBSEQUENT HOSPITAL CARE,LEVL II: ICD-10-PCS | Mod: ,,, | Performed by: NURSE PRACTITIONER

## 2022-01-01 PROCEDURE — 99233 SBSQ HOSP IP/OBS HIGH 50: CPT | Mod: ,,, | Performed by: GENERAL PRACTICE

## 2022-01-01 PROCEDURE — 36415 COLL VENOUS BLD VENIPUNCTURE: CPT | Performed by: STUDENT IN AN ORGANIZED HEALTH CARE EDUCATION/TRAINING PROGRAM

## 2022-01-01 PROCEDURE — P9016 RBC LEUKOCYTES REDUCED: HCPCS | Performed by: INTERNAL MEDICINE

## 2022-01-01 PROCEDURE — 37000008 HC ANESTHESIA 1ST 15 MINUTES: Performed by: SURGERY

## 2022-01-01 PROCEDURE — 86920 COMPATIBILITY TEST SPIN: CPT | Mod: 59 | Performed by: INTERNAL MEDICINE

## 2022-01-01 PROCEDURE — P9035 PLATELET PHERES LEUKOREDUCED: HCPCS | Performed by: NURSE PRACTITIONER

## 2022-01-01 PROCEDURE — 85610 PROTHROMBIN TIME: CPT | Performed by: EMERGENCY MEDICINE

## 2022-01-01 PROCEDURE — P9016 RBC LEUKOCYTES REDUCED: HCPCS | Performed by: STUDENT IN AN ORGANIZED HEALTH CARE EDUCATION/TRAINING PROGRAM

## 2022-01-01 PROCEDURE — 99233 SBSQ HOSP IP/OBS HIGH 50: CPT | Mod: GC,,, | Performed by: INTERNAL MEDICINE

## 2022-01-01 PROCEDURE — 63600175 PHARM REV CODE 636 W HCPCS

## 2022-01-01 PROCEDURE — 99232 PR SUBSEQUENT HOSPITAL CARE,LEVL II: ICD-10-PCS | Mod: FS,,, | Performed by: GENERAL PRACTICE

## 2022-01-01 PROCEDURE — 84484 ASSAY OF TROPONIN QUANT: CPT | Performed by: EMERGENCY MEDICINE

## 2022-01-01 PROCEDURE — P9035 PLATELET PHERES LEUKOREDUCED: HCPCS | Performed by: SURGERY

## 2022-01-01 PROCEDURE — 87635 SARS-COV-2 COVID-19 AMP PRB: CPT | Performed by: NURSE PRACTITIONER

## 2022-01-01 PROCEDURE — 99232 SBSQ HOSP IP/OBS MODERATE 35: CPT | Mod: FS,,, | Performed by: GENERAL PRACTICE

## 2022-01-01 PROCEDURE — 85027 COMPLETE CBC AUTOMATED: CPT | Mod: 91 | Performed by: ANESTHESIOLOGY

## 2022-01-01 PROCEDURE — 99214 OFFICE O/P EST MOD 30 MIN: CPT | Mod: S$PBB,,, | Performed by: NURSE PRACTITIONER

## 2022-01-01 PROCEDURE — 88237 TISSUE CULTURE BONE MARROW: CPT

## 2022-01-01 PROCEDURE — 83615 LACTATE (LD) (LDH) ENZYME: CPT | Performed by: INTERNAL MEDICINE

## 2022-01-01 PROCEDURE — 87070 CULTURE OTHR SPECIMN AEROBIC: CPT | Performed by: GENERAL PRACTICE

## 2022-01-01 PROCEDURE — P9037 PLATE PHERES LEUKOREDU IRRAD: HCPCS | Performed by: STUDENT IN AN ORGANIZED HEALTH CARE EDUCATION/TRAINING PROGRAM

## 2022-01-01 PROCEDURE — 63600175 PHARM REV CODE 636 W HCPCS: Performed by: SURGERY

## 2022-01-01 PROCEDURE — 63600175 PHARM REV CODE 636 W HCPCS: Performed by: NURSE ANESTHETIST, CERTIFIED REGISTERED

## 2022-01-01 PROCEDURE — 25500020 PHARM REV CODE 255: Performed by: EMERGENCY MEDICINE

## 2022-01-01 PROCEDURE — 87102 FUNGUS ISOLATION CULTURE: CPT | Performed by: INTERNAL MEDICINE

## 2022-01-01 PROCEDURE — 87116 MYCOBACTERIA CULTURE: CPT | Performed by: INTERNAL MEDICINE

## 2022-01-01 PROCEDURE — 93005 ELECTROCARDIOGRAM TRACING: CPT

## 2022-01-01 PROCEDURE — 99214 PR OFFICE/OUTPT VISIT, EST, LEVL IV, 30-39 MIN: ICD-10-PCS | Mod: S$PBB,,, | Performed by: NURSE PRACTITIONER

## 2022-01-01 PROCEDURE — 85060 BLOOD SMEAR INTERPRETATION: CPT | Performed by: INTERNAL MEDICINE

## 2022-01-01 PROCEDURE — 88291 CYTO/MOLECULAR REPORT: CPT

## 2022-01-01 PROCEDURE — 96375 TX/PRO/DX INJ NEW DRUG ADDON: CPT | Mod: 59

## 2022-01-01 PROCEDURE — 99232 PR SUBSEQUENT HOSPITAL CARE,LEVL II: ICD-10-PCS | Mod: GC,,, | Performed by: NURSE PRACTITIONER

## 2022-01-01 PROCEDURE — A9577 INJ MULTIHANCE: HCPCS | Performed by: EMERGENCY MEDICINE

## 2022-01-01 PROCEDURE — P9016 RBC LEUKOCYTES REDUCED: HCPCS | Performed by: ANESTHESIOLOGY

## 2022-01-01 PROCEDURE — 85007 BL SMEAR W/DIFF WBC COUNT: CPT | Performed by: INTERNAL MEDICINE

## 2022-01-01 PROCEDURE — 87641 MR-STAPH DNA AMP PROBE: CPT | Performed by: NURSE PRACTITIONER

## 2022-01-01 PROCEDURE — 93010 ELECTROCARDIOGRAM REPORT: CPT | Mod: ,,, | Performed by: INTERNAL MEDICINE

## 2022-01-01 PROCEDURE — 99223 PR INITIAL HOSPITAL CARE,LEVL III: ICD-10-PCS | Mod: ,,, | Performed by: NEUROLOGICAL SURGERY

## 2022-01-01 PROCEDURE — 99232 SBSQ HOSP IP/OBS MODERATE 35: CPT | Mod: GC,,, | Performed by: NURSE PRACTITIONER

## 2022-01-01 PROCEDURE — 87040 BLOOD CULTURE FOR BACTERIA: CPT | Performed by: INTERNAL MEDICINE

## 2022-01-01 PROCEDURE — 25000003 PHARM REV CODE 250: Performed by: PATHOLOGY

## 2022-01-01 PROCEDURE — 37000009 HC ANESTHESIA EA ADD 15 MINS: Performed by: SURGERY

## 2022-01-01 PROCEDURE — 81001 URINALYSIS AUTO W/SCOPE: CPT | Performed by: STUDENT IN AN ORGANIZED HEALTH CARE EDUCATION/TRAINING PROGRAM

## 2022-01-01 PROCEDURE — 87305 ASPERGILLUS AG IA: CPT | Performed by: NURSE PRACTITIONER

## 2022-01-01 PROCEDURE — 80061 LIPID PANEL: CPT | Performed by: STUDENT IN AN ORGANIZED HEALTH CARE EDUCATION/TRAINING PROGRAM

## 2022-01-01 PROCEDURE — 87075 CULTR BACTERIA EXCEPT BLOOD: CPT | Performed by: INTERNAL MEDICINE

## 2022-01-01 PROCEDURE — 88275 CYTOGENETICS 100-300: CPT

## 2022-01-01 PROCEDURE — 36415 COLL VENOUS BLD VENIPUNCTURE: CPT | Performed by: NURSE PRACTITIONER

## 2022-01-01 PROCEDURE — 38570 LAPAROSCOPY LYMPH NODE BIOP: CPT | Mod: ,,, | Performed by: SURGERY

## 2022-01-01 PROCEDURE — 99233 PR SUBSEQUENT HOSPITAL CARE,LEVL III: ICD-10-PCS | Mod: ,,, | Performed by: GENERAL PRACTICE

## 2022-01-01 PROCEDURE — 88271 CYTOGENETICS DNA PROBE: CPT

## 2022-01-01 PROCEDURE — 63600175 PHARM REV CODE 636 W HCPCS: Performed by: EMERGENCY MEDICINE

## 2022-01-01 PROCEDURE — 99223 PR INITIAL HOSPITAL CARE,LEVL III: ICD-10-PCS | Mod: FS,,, | Performed by: GENERAL PRACTICE

## 2022-01-01 RX ORDER — HEPARIN 100 UNIT/ML
500 SYRINGE INTRAVENOUS
Status: CANCELLED | OUTPATIENT
Start: 2022-01-01

## 2022-01-01 RX ORDER — LOSARTAN POTASSIUM 100 MG/1
100 TABLET ORAL DAILY
Qty: 90 TABLET | Refills: 0 | Status: SHIPPED | OUTPATIENT
Start: 2022-01-01

## 2022-01-01 RX ORDER — DIPHENHYDRAMINE HYDROCHLORIDE 50 MG/ML
25 INJECTION INTRAMUSCULAR; INTRAVENOUS ONCE
Status: COMPLETED | OUTPATIENT
Start: 2022-01-01 | End: 2022-01-01

## 2022-01-01 RX ORDER — SODIUM CHLORIDE 9 MG/ML
INJECTION, SOLUTION INTRAVENOUS CONTINUOUS
Status: DISCONTINUED | OUTPATIENT
Start: 2022-01-01 | End: 2022-01-01 | Stop reason: HOSPADM

## 2022-01-01 RX ORDER — ACETAMINOPHEN 325 MG/1
650 TABLET ORAL ONCE
Status: DISCONTINUED | OUTPATIENT
Start: 2022-01-01 | End: 2022-01-01

## 2022-01-01 RX ORDER — HYDROCODONE BITARTRATE AND ACETAMINOPHEN 500; 5 MG/1; MG/1
TABLET ORAL
Status: DISCONTINUED | OUTPATIENT
Start: 2022-01-01 | End: 2022-01-01 | Stop reason: HOSPADM

## 2022-01-01 RX ORDER — ACETAMINOPHEN 325 MG/1
650 TABLET ORAL
Status: CANCELLED | OUTPATIENT
Start: 2022-01-01

## 2022-01-01 RX ORDER — SODIUM CHLORIDE 0.9 % (FLUSH) 0.9 %
10 SYRINGE (ML) INJECTION
Status: DISCONTINUED | OUTPATIENT
Start: 2022-01-01 | End: 2022-01-01 | Stop reason: HOSPADM

## 2022-01-01 RX ORDER — PREDNISONE 20 MG/1
40 TABLET ORAL DAILY
Status: DISCONTINUED | OUTPATIENT
Start: 2022-01-01 | End: 2022-01-01

## 2022-01-01 RX ORDER — HYDROCODONE BITARTRATE AND ACETAMINOPHEN 500; 5 MG/1; MG/1
TABLET ORAL ONCE
Status: CANCELLED | OUTPATIENT
Start: 2022-01-01 | End: 2022-01-01

## 2022-01-01 RX ORDER — DIPHENHYDRAMINE HYDROCHLORIDE 50 MG/ML
INJECTION INTRAMUSCULAR; INTRAVENOUS
Status: DISPENSED
Start: 2022-01-01 | End: 2022-01-01

## 2022-01-01 RX ORDER — HYDROCODONE BITARTRATE AND ACETAMINOPHEN 500; 5 MG/1; MG/1
TABLET ORAL
Status: DISCONTINUED | OUTPATIENT
Start: 2022-01-01 | End: 2022-01-01

## 2022-01-01 RX ORDER — IBUPROFEN 400 MG/1
400 TABLET ORAL ONCE
Status: DISCONTINUED | OUTPATIENT
Start: 2022-01-01 | End: 2022-01-01

## 2022-01-01 RX ORDER — CEFAZOLIN SODIUM 1 G/3ML
INJECTION, POWDER, FOR SOLUTION INTRAMUSCULAR; INTRAVENOUS
Status: DISCONTINUED | OUTPATIENT
Start: 2022-01-01 | End: 2022-01-01

## 2022-01-01 RX ORDER — MIDAZOLAM HYDROCHLORIDE 5 MG/ML
1 INJECTION INTRAMUSCULAR; INTRAVENOUS DAILY PRN
Status: DISCONTINUED | OUTPATIENT
Start: 2022-01-01 | End: 2022-01-01 | Stop reason: HOSPADM

## 2022-01-01 RX ORDER — ACALABRUTINIB 100 MG/1
1 CAPSULE, GELATIN COATED ORAL 2 TIMES DAILY
Qty: 60 CAPSULE | Refills: 6 | Status: SHIPPED | OUTPATIENT
Start: 2022-01-01

## 2022-01-01 RX ORDER — DIPHENHYDRAMINE HCL 25 MG
25 CAPSULE ORAL EVERY 6 HOURS PRN
Status: DISCONTINUED | OUTPATIENT
Start: 2022-01-01 | End: 2022-01-01 | Stop reason: HOSPADM

## 2022-01-01 RX ORDER — DIPHENHYDRAMINE HYDROCHLORIDE 50 MG/ML
12.5 INJECTION INTRAMUSCULAR; INTRAVENOUS ONCE
Status: COMPLETED | OUTPATIENT
Start: 2022-01-01 | End: 2022-01-01

## 2022-01-01 RX ORDER — LIDOCAINE HYDROCHLORIDE 10 MG/ML
INJECTION INFILTRATION; PERINEURAL
Status: COMPLETED
Start: 2022-01-01 | End: 2022-01-01

## 2022-01-01 RX ORDER — SODIUM CHLORIDE 0.9 % (FLUSH) 0.9 %
SYRINGE (ML) INJECTION
Status: DISCONTINUED | OUTPATIENT
Start: 2022-01-01 | End: 2022-01-01 | Stop reason: HOSPADM

## 2022-01-01 RX ORDER — SODIUM CHLORIDE 0.9 % (FLUSH) 0.9 %
10 SYRINGE (ML) INJECTION
Status: CANCELLED | OUTPATIENT
Start: 2022-01-01

## 2022-01-01 RX ORDER — HEPARIN 100 UNIT/ML
500 SYRINGE INTRAVENOUS
Status: DISCONTINUED | OUTPATIENT
Start: 2022-01-01 | End: 2022-01-01 | Stop reason: HOSPADM

## 2022-01-01 RX ORDER — BENZONATATE 100 MG/1
100 CAPSULE ORAL 3 TIMES DAILY PRN
Status: DISCONTINUED | OUTPATIENT
Start: 2022-01-01 | End: 2022-01-01 | Stop reason: HOSPADM

## 2022-01-01 RX ORDER — LABETALOL HYDROCHLORIDE 5 MG/ML
10 INJECTION, SOLUTION INTRAVENOUS
Status: DISCONTINUED | OUTPATIENT
Start: 2022-01-01 | End: 2022-01-01 | Stop reason: HOSPADM

## 2022-01-01 RX ORDER — FAMOTIDINE 10 MG/ML
20 INJECTION INTRAVENOUS 2 TIMES DAILY
Status: CANCELLED | OUTPATIENT
Start: 2022-01-01

## 2022-01-01 RX ORDER — ACETAMINOPHEN 325 MG/1
650 TABLET ORAL
Status: COMPLETED | OUTPATIENT
Start: 2022-01-01 | End: 2022-01-01

## 2022-01-01 RX ORDER — FUROSEMIDE 10 MG/ML
10 INJECTION INTRAMUSCULAR; INTRAVENOUS ONCE
Status: COMPLETED | OUTPATIENT
Start: 2022-01-01 | End: 2022-01-01

## 2022-01-01 RX ORDER — FAMOTIDINE 20 MG/1
20 TABLET, FILM COATED ORAL 2 TIMES DAILY
Status: DISCONTINUED | OUTPATIENT
Start: 2022-01-01 | End: 2022-01-01

## 2022-01-01 RX ORDER — MIDAZOLAM HYDROCHLORIDE 1 MG/ML
INJECTION INTRAMUSCULAR; INTRAVENOUS
Status: DISCONTINUED | OUTPATIENT
Start: 2022-01-01 | End: 2022-01-01

## 2022-01-01 RX ORDER — ESMOLOL HYDROCHLORIDE 10 MG/ML
INJECTION INTRAVENOUS
Status: DISCONTINUED | OUTPATIENT
Start: 2022-01-01 | End: 2022-01-01

## 2022-01-01 RX ORDER — DIPHENHYDRAMINE HCL 25 MG
25 CAPSULE ORAL ONCE
Status: COMPLETED | OUTPATIENT
Start: 2022-01-01 | End: 2022-01-01

## 2022-01-01 RX ORDER — FAMOTIDINE 10 MG/ML
20 INJECTION INTRAVENOUS 2 TIMES DAILY
Status: DISCONTINUED | OUTPATIENT
Start: 2022-01-01 | End: 2022-01-01 | Stop reason: HOSPADM

## 2022-01-01 RX ORDER — DIPHENHYDRAMINE HYDROCHLORIDE 50 MG/ML
25 INJECTION INTRAMUSCULAR; INTRAVENOUS
Status: COMPLETED | OUTPATIENT
Start: 2022-01-01 | End: 2022-01-01

## 2022-01-01 RX ORDER — ACETAMINOPHEN 10 MG/ML
1000 INJECTION, SOLUTION INTRAVENOUS ONCE
Status: COMPLETED | OUTPATIENT
Start: 2022-01-01 | End: 2022-01-01

## 2022-01-01 RX ORDER — FUROSEMIDE 10 MG/ML
20 INJECTION INTRAMUSCULAR; INTRAVENOUS ONCE
Status: COMPLETED | OUTPATIENT
Start: 2022-01-01 | End: 2022-01-01

## 2022-01-01 RX ORDER — HYDROCODONE BITARTRATE AND ACETAMINOPHEN 500; 5 MG/1; MG/1
TABLET ORAL ONCE
Status: DISCONTINUED | OUTPATIENT
Start: 2022-01-01 | End: 2022-01-01 | Stop reason: HOSPADM

## 2022-01-01 RX ORDER — HYDROCODONE BITARTRATE AND ACETAMINOPHEN 500; 5 MG/1; MG/1
TABLET ORAL
Status: CANCELLED | OUTPATIENT
Start: 2022-01-01

## 2022-01-01 RX ORDER — FUROSEMIDE 10 MG/ML
INJECTION INTRAMUSCULAR; INTRAVENOUS
Status: COMPLETED
Start: 2022-01-01 | End: 2022-01-01

## 2022-01-01 RX ORDER — ACETAMINOPHEN 325 MG/1
650 TABLET ORAL EVERY 4 HOURS PRN
Status: DISCONTINUED | OUTPATIENT
Start: 2022-01-01 | End: 2022-01-01 | Stop reason: HOSPADM

## 2022-01-01 RX ORDER — IBUPROFEN 400 MG/1
400 TABLET ORAL ONCE
Status: COMPLETED | OUTPATIENT
Start: 2022-01-01 | End: 2022-01-01

## 2022-01-01 RX ORDER — SODIUM CHLORIDE 0.9 % (FLUSH) 0.9 %
10 SYRINGE (ML) INJECTION EVERY 12 HOURS PRN
Status: DISCONTINUED | OUTPATIENT
Start: 2022-01-01 | End: 2022-01-01 | Stop reason: HOSPADM

## 2022-01-01 RX ORDER — DIPHENHYDRAMINE HCL 25 MG
25 CAPSULE ORAL EVERY 6 HOURS PRN
Status: DISCONTINUED | OUTPATIENT
Start: 2022-01-01 | End: 2022-01-01

## 2022-01-01 RX ORDER — ACETAMINOPHEN 325 MG/1
650 TABLET ORAL EVERY 8 HOURS PRN
Status: DISCONTINUED | OUTPATIENT
Start: 2022-01-01 | End: 2022-01-01

## 2022-01-01 RX ORDER — ROCURONIUM BROMIDE 10 MG/ML
INJECTION, SOLUTION INTRAVENOUS
Status: DISCONTINUED | OUTPATIENT
Start: 2022-01-01 | End: 2022-01-01

## 2022-01-01 RX ORDER — ACETAMINOPHEN 325 MG/1
650 TABLET ORAL ONCE
Status: CANCELLED | OUTPATIENT
Start: 2022-01-01

## 2022-01-01 RX ORDER — DEXAMETHASONE SODIUM PHOSPHATE 4 MG/ML
8 INJECTION, SOLUTION INTRA-ARTICULAR; INTRALESIONAL; INTRAMUSCULAR; INTRAVENOUS; SOFT TISSUE ONCE
Status: DISCONTINUED | OUTPATIENT
Start: 2022-01-01 | End: 2022-01-01

## 2022-01-01 RX ORDER — FUROSEMIDE 10 MG/ML
40 INJECTION INTRAMUSCULAR; INTRAVENOUS
Status: COMPLETED | OUTPATIENT
Start: 2022-01-01 | End: 2022-01-01

## 2022-01-01 RX ORDER — PROCHLORPERAZINE EDISYLATE 5 MG/ML
5 INJECTION INTRAMUSCULAR; INTRAVENOUS EVERY 6 HOURS PRN
Status: DISCONTINUED | OUTPATIENT
Start: 2022-01-01 | End: 2022-01-01 | Stop reason: HOSPADM

## 2022-01-01 RX ORDER — ACETAMINOPHEN 10 MG/ML
1000 INJECTION, SOLUTION INTRAVENOUS EVERY 8 HOURS
Status: COMPLETED | OUTPATIENT
Start: 2022-01-01 | End: 2022-01-01

## 2022-01-01 RX ORDER — SCOLOPAMINE TRANSDERMAL SYSTEM 1 MG/1
1 PATCH, EXTENDED RELEASE TRANSDERMAL
Status: DISCONTINUED | OUTPATIENT
Start: 2022-01-01 | End: 2022-01-01 | Stop reason: HOSPADM

## 2022-01-01 RX ORDER — ACETAMINOPHEN 325 MG/1
650 TABLET ORAL ONCE
Status: COMPLETED | OUTPATIENT
Start: 2022-01-01 | End: 2022-01-01

## 2022-01-01 RX ORDER — DIPHENHYDRAMINE HYDROCHLORIDE 50 MG/ML
25 INJECTION INTRAMUSCULAR; INTRAVENOUS ONCE
Status: DISCONTINUED | OUTPATIENT
Start: 2022-01-01 | End: 2022-01-01

## 2022-01-01 RX ORDER — MIDAZOLAM HYDROCHLORIDE 5 MG/ML
INJECTION INTRAMUSCULAR; INTRAVENOUS
Status: COMPLETED
Start: 2022-01-01 | End: 2022-01-01

## 2022-01-01 RX ORDER — PANTOPRAZOLE SODIUM 40 MG/1
40 TABLET, DELAYED RELEASE ORAL DAILY
Status: DISCONTINUED | OUTPATIENT
Start: 2022-01-01 | End: 2022-01-01

## 2022-01-01 RX ORDER — GUAIFENESIN 600 MG/1
600 TABLET, EXTENDED RELEASE ORAL 2 TIMES DAILY
Status: DISCONTINUED | OUTPATIENT
Start: 2022-01-01 | End: 2022-01-01

## 2022-01-01 RX ORDER — POLYETHYLENE GLYCOL 3350 17 G/17G
17 POWDER, FOR SOLUTION ORAL DAILY PRN
Status: DISCONTINUED | OUTPATIENT
Start: 2022-01-01 | End: 2022-01-01 | Stop reason: HOSPADM

## 2022-01-01 RX ORDER — FLUMAZENIL 0.1 MG/ML
0.2 INJECTION INTRAVENOUS ONCE
Status: DISCONTINUED | OUTPATIENT
Start: 2022-01-01 | End: 2022-01-01

## 2022-01-01 RX ORDER — ONDANSETRON 2 MG/ML
4 INJECTION INTRAMUSCULAR; INTRAVENOUS EVERY 8 HOURS PRN
Status: DISCONTINUED | OUTPATIENT
Start: 2022-01-01 | End: 2022-01-01

## 2022-01-01 RX ORDER — PREDNISONE 20 MG/1
40 TABLET ORAL DAILY
Qty: 60 TABLET | Refills: 1 | Status: SHIPPED | OUTPATIENT
Start: 2022-01-01

## 2022-01-01 RX ORDER — BUPIVACAINE HYDROCHLORIDE 5 MG/ML
INJECTION, SOLUTION EPIDURAL; INTRACAUDAL
Status: DISCONTINUED | OUTPATIENT
Start: 2022-01-01 | End: 2022-01-01 | Stop reason: HOSPADM

## 2022-01-01 RX ORDER — PREDNISONE 20 MG/1
20 TABLET ORAL DAILY
Status: DISCONTINUED | OUTPATIENT
Start: 2022-01-01 | End: 2022-01-01

## 2022-01-01 RX ORDER — ACALABRUTINIB 100 MG/1
1 CAPSULE, GELATIN COATED ORAL 2 TIMES DAILY
COMMUNITY
Start: 2022-01-01 | End: 2022-01-01 | Stop reason: SDUPTHER

## 2022-01-01 RX ORDER — LIDOCAINE HYDROCHLORIDE 10 MG/ML
1 INJECTION, SOLUTION EPIDURAL; INFILTRATION; INTRACAUDAL; PERINEURAL ONCE
Status: DISCONTINUED | OUTPATIENT
Start: 2022-01-01 | End: 2022-01-01

## 2022-01-01 RX ORDER — ONDANSETRON 2 MG/ML
INJECTION INTRAMUSCULAR; INTRAVENOUS
Status: DISCONTINUED | OUTPATIENT
Start: 2022-01-01 | End: 2022-01-01

## 2022-01-01 RX ORDER — ONDANSETRON 2 MG/ML
4 INJECTION INTRAMUSCULAR; INTRAVENOUS EVERY 8 HOURS PRN
Status: DISCONTINUED | OUTPATIENT
Start: 2022-01-01 | End: 2022-01-01 | Stop reason: HOSPADM

## 2022-01-01 RX ORDER — DIPHENHYDRAMINE HYDROCHLORIDE 50 MG/ML
12.5 INJECTION INTRAMUSCULAR; INTRAVENOUS ONCE
Status: CANCELLED | OUTPATIENT
Start: 2022-01-01

## 2022-01-01 RX ORDER — FENTANYL CITRATE 50 UG/ML
INJECTION, SOLUTION INTRAMUSCULAR; INTRAVENOUS
Status: DISCONTINUED | OUTPATIENT
Start: 2022-01-01 | End: 2022-01-01

## 2022-01-01 RX ORDER — PROPOFOL 10 MG/ML
VIAL (ML) INTRAVENOUS
Status: DISCONTINUED | OUTPATIENT
Start: 2022-01-01 | End: 2022-01-01

## 2022-01-01 RX ORDER — DEXAMETHASONE SODIUM PHOSPHATE 4 MG/ML
8 INJECTION, SOLUTION INTRA-ARTICULAR; INTRALESIONAL; INTRAMUSCULAR; INTRAVENOUS; SOFT TISSUE ONCE
Status: COMPLETED | OUTPATIENT
Start: 2022-01-01 | End: 2022-01-01

## 2022-01-01 RX ORDER — MORPHINE SULFATE 4 MG/ML
4 INJECTION, SOLUTION INTRAMUSCULAR; INTRAVENOUS
Status: DISCONTINUED | OUTPATIENT
Start: 2022-01-01 | End: 2022-01-01 | Stop reason: HOSPADM

## 2022-01-01 RX ORDER — ITRACONAZOLE 100 MG/1
200 CAPSULE ORAL 2 TIMES DAILY
Status: DISCONTINUED | OUTPATIENT
Start: 2022-01-01 | End: 2022-01-01

## 2022-01-01 RX ADMIN — MEROPENEM 1 G: 1 INJECTION, POWDER, FOR SOLUTION INTRAVENOUS at 12:08

## 2022-01-01 RX ADMIN — GUAIFENESIN 600 MG: 600 TABLET, EXTENDED RELEASE ORAL at 08:09

## 2022-01-01 RX ADMIN — SCOPOLAMINE 1 PATCH: 1 PATCH TRANSDERMAL at 10:09

## 2022-01-01 RX ADMIN — THERA TABS 1 TABLET: TAB at 08:08

## 2022-01-01 RX ADMIN — GUAIFENESIN 600 MG: 600 TABLET, EXTENDED RELEASE ORAL at 08:08

## 2022-01-01 RX ADMIN — CEFEPIME 2 G: 2 INJECTION, POWDER, FOR SOLUTION INTRAVENOUS at 03:08

## 2022-01-01 RX ADMIN — ACETAMINOPHEN 650 MG: 325 TABLET ORAL at 03:08

## 2022-01-01 RX ADMIN — ELTROMBOPAG OLAMINE 100 MG: 50 TABLET, FILM COATED ORAL at 05:08

## 2022-01-01 RX ADMIN — MEROPENEM 1 G: 1 INJECTION, POWDER, FOR SOLUTION INTRAVENOUS at 08:08

## 2022-01-01 RX ADMIN — ACETAMINOPHEN 325MG 650 MG: 325 TABLET ORAL at 08:08

## 2022-01-01 RX ADMIN — IOPAMIDOL 100 ML: 755 INJECTION, SOLUTION INTRAVENOUS at 07:08

## 2022-01-01 RX ADMIN — FAMOTIDINE 20 MG: 20 TABLET, FILM COATED ORAL at 12:08

## 2022-01-01 RX ADMIN — MEROPENEM 1 G: 1 INJECTION, POWDER, FOR SOLUTION INTRAVENOUS at 04:08

## 2022-01-01 RX ADMIN — MORPHINE SULFATE 4 MG: 4 INJECTION INTRAVENOUS at 02:09

## 2022-01-01 RX ADMIN — MORPHINE SULFATE 4 MG: 4 INJECTION INTRAVENOUS at 03:09

## 2022-01-01 RX ADMIN — ACETAMINOPHEN 325MG 650 MG: 325 TABLET ORAL at 11:08

## 2022-01-01 RX ADMIN — AZITHROMYCIN MONOHYDRATE 250 MG: 500 INJECTION, POWDER, LYOPHILIZED, FOR SOLUTION INTRAVENOUS at 03:08

## 2022-01-01 RX ADMIN — ITRACONAZOLE 200 MG: 100 CAPSULE ORAL at 12:08

## 2022-01-01 RX ADMIN — GUAIFENESIN 600 MG: 600 TABLET, EXTENDED RELEASE ORAL at 09:08

## 2022-01-01 RX ADMIN — AZITHROMYCIN MONOHYDRATE 250 MG: 500 INJECTION, POWDER, LYOPHILIZED, FOR SOLUTION INTRAVENOUS at 02:08

## 2022-01-01 RX ADMIN — MEROPENEM 1 G: 1 INJECTION, POWDER, FOR SOLUTION INTRAVENOUS at 11:09

## 2022-01-01 RX ADMIN — ACETAMINOPHEN 325MG 650 MG: 325 TABLET ORAL at 03:08

## 2022-01-01 RX ADMIN — ACETAMINOPHEN 650 MG: 325 TABLET ORAL at 08:08

## 2022-01-01 RX ADMIN — GUAIFENESIN 600 MG: 600 TABLET, EXTENDED RELEASE ORAL at 09:09

## 2022-01-01 RX ADMIN — DIPHENHYDRAMINE HYDROCHLORIDE 12.5 MG: 50 INJECTION INTRAMUSCULAR; INTRAVENOUS at 10:09

## 2022-01-01 RX ADMIN — AZITHROMYCIN MONOHYDRATE 250 MG: 500 INJECTION, POWDER, LYOPHILIZED, FOR SOLUTION INTRAVENOUS at 05:08

## 2022-01-01 RX ADMIN — ELTROMBOPAG OLAMINE 100 MG: 50 TABLET, FILM COATED ORAL at 02:08

## 2022-01-01 RX ADMIN — MEROPENEM 1 G: 1 INJECTION, POWDER, FOR SOLUTION INTRAVENOUS at 01:08

## 2022-01-01 RX ADMIN — THERA TABS 1 TABLET: TAB at 09:08

## 2022-01-01 RX ADMIN — ACETAMINOPHEN 325MG 650 MG: 325 TABLET ORAL at 12:08

## 2022-01-01 RX ADMIN — FAMOTIDINE 20 MG: 20 TABLET, FILM COATED ORAL at 08:08

## 2022-01-01 RX ADMIN — CEFEPIME 2 G: 2 INJECTION, POWDER, FOR SOLUTION INTRAVENOUS at 11:08

## 2022-01-01 RX ADMIN — ROCURONIUM BROMIDE 10 MG: 10 SOLUTION INTRAVENOUS at 04:09

## 2022-01-01 RX ADMIN — AZITHROMYCIN MONOHYDRATE 250 MG: 500 INJECTION, POWDER, LYOPHILIZED, FOR SOLUTION INTRAVENOUS at 04:08

## 2022-01-01 RX ADMIN — CEFEPIME 2 G: 2 INJECTION, POWDER, FOR SOLUTION INTRAVENOUS at 06:08

## 2022-01-01 RX ADMIN — PREDNISONE 40 MG: 20 TABLET ORAL at 08:08

## 2022-01-01 RX ADMIN — ITRACONAZOLE 200 MG: 100 CAPSULE ORAL at 08:08

## 2022-01-01 RX ADMIN — ELTROMBOPAG OLAMINE 100 MG: 50 TABLET, FILM COATED ORAL at 03:09

## 2022-01-01 RX ADMIN — DEXAMETHASONE SODIUM PHOSPHATE 8 MG: 4 INJECTION, SOLUTION INTRA-ARTICULAR; INTRALESIONAL; INTRAMUSCULAR; INTRAVENOUS; SOFT TISSUE at 07:08

## 2022-01-01 RX ADMIN — FAMOTIDINE 20 MG: 20 TABLET, FILM COATED ORAL at 09:08

## 2022-01-01 RX ADMIN — ACETAMINOPHEN 325MG 650 MG: 325 TABLET ORAL at 05:08

## 2022-01-01 RX ADMIN — CEFEPIME 2 G: 2 INJECTION, POWDER, FOR SOLUTION INTRAVENOUS at 07:08

## 2022-01-01 RX ADMIN — CEFEPIME 1 G: 1 INJECTION, POWDER, FOR SOLUTION INTRAMUSCULAR; INTRAVENOUS at 01:08

## 2022-01-01 RX ADMIN — SODIUM CHLORIDE, SODIUM GLUCONATE, SODIUM ACETATE, POTASSIUM CHLORIDE AND MAGNESIUM CHLORIDE: 526; 502; 368; 37; 30 INJECTION, SOLUTION INTRAVENOUS at 04:09

## 2022-01-01 RX ADMIN — MORPHINE SULFATE 4 MG: 4 INJECTION INTRAVENOUS at 05:09

## 2022-01-01 RX ADMIN — DIPHENHYDRAMINE HYDROCHLORIDE 25 MG: 50 INJECTION, SOLUTION INTRAMUSCULAR; INTRAVENOUS at 08:08

## 2022-01-01 RX ADMIN — ACETAMINOPHEN 650 MG: 325 TABLET, FILM COATED ORAL at 08:08

## 2022-01-01 RX ADMIN — VANCOMYCIN HYDROCHLORIDE 1250 MG: 1.25 INJECTION, POWDER, LYOPHILIZED, FOR SOLUTION INTRAVENOUS at 03:09

## 2022-01-01 RX ADMIN — DIPHENHYDRAMINE HYDROCHLORIDE 25 MG: 50 INJECTION INTRAMUSCULAR; INTRAVENOUS at 08:08

## 2022-01-01 RX ADMIN — ACETAMINOPHEN 650 MG: 325 TABLET ORAL at 05:08

## 2022-01-01 RX ADMIN — ELTROMBOPAG OLAMINE 100 MG: 50 TABLET, FILM COATED ORAL at 03:08

## 2022-01-01 RX ADMIN — PREDNISONE 20 MG: 20 TABLET ORAL at 09:08

## 2022-01-01 RX ADMIN — PREDNISONE 40 MG: 20 TABLET ORAL at 09:09

## 2022-01-01 RX ADMIN — ACETAMINOPHEN 325MG 650 MG: 325 TABLET ORAL at 03:09

## 2022-01-01 RX ADMIN — MIDAZOLAM HYDROCHLORIDE 1 MG: 5 INJECTION, SOLUTION INTRAMUSCULAR; INTRAVENOUS at 11:08

## 2022-01-01 RX ADMIN — DIPHENHYDRAMINE HYDROCHLORIDE 25 MG: 25 CAPSULE ORAL at 05:08

## 2022-01-01 RX ADMIN — DIPHENHYDRAMINE HYDROCHLORIDE 12.5 MG: 50 INJECTION, SOLUTION INTRAMUSCULAR; INTRAVENOUS at 08:08

## 2022-01-01 RX ADMIN — ACETAMINOPHEN 650 MG: 325 TABLET ORAL at 02:09

## 2022-01-01 RX ADMIN — THERA TABS 1 TABLET: TAB at 02:08

## 2022-01-01 RX ADMIN — FENTANYL CITRATE 50 MCG: 50 INJECTION, SOLUTION INTRAMUSCULAR; INTRAVENOUS at 04:09

## 2022-01-01 RX ADMIN — ACETAMINOPHEN 1000 MG: 10 INJECTION, SOLUTION INTRAVENOUS at 11:09

## 2022-01-01 RX ADMIN — FAMOTIDINE 20 MG: 20 TABLET, FILM COATED ORAL at 08:09

## 2022-01-01 RX ADMIN — PANTOPRAZOLE SODIUM 40 MG: 40 TABLET, DELAYED RELEASE ORAL at 09:08

## 2022-01-01 RX ADMIN — ACETAMINOPHEN 650 MG: 325 TABLET ORAL at 11:08

## 2022-01-01 RX ADMIN — FUROSEMIDE 20 MG: 10 INJECTION, SOLUTION INTRAMUSCULAR; INTRAVENOUS at 12:09

## 2022-01-01 RX ADMIN — MORPHINE SULFATE 4 MG: 4 INJECTION INTRAVENOUS at 07:09

## 2022-01-01 RX ADMIN — PANTOPRAZOLE SODIUM 40 MG: 40 TABLET, DELAYED RELEASE ORAL at 08:08

## 2022-01-01 RX ADMIN — PREDNISONE 20 MG: 20 TABLET ORAL at 08:08

## 2022-01-01 RX ADMIN — BENZONATATE 100 MG: 100 CAPSULE ORAL at 03:09

## 2022-01-01 RX ADMIN — IBUPROFEN 400 MG: 400 TABLET ORAL at 05:08

## 2022-01-01 RX ADMIN — ONDANSETRON 4 MG: 2 INJECTION INTRAMUSCULAR; INTRAVENOUS at 05:09

## 2022-01-01 RX ADMIN — ACETAMINOPHEN 325MG 650 MG: 325 TABLET ORAL at 10:08

## 2022-01-01 RX ADMIN — ROCURONIUM BROMIDE 40 MG: 10 SOLUTION INTRAVENOUS at 04:09

## 2022-01-01 RX ADMIN — FAMOTIDINE 20 MG: 20 TABLET, FILM COATED ORAL at 09:09

## 2022-01-01 RX ADMIN — ACETAMINOPHEN 1000 MG: 10 INJECTION, SOLUTION INTRAVENOUS at 08:09

## 2022-01-01 RX ADMIN — FUROSEMIDE 40 MG: 10 INJECTION, SOLUTION INTRAMUSCULAR; INTRAVENOUS at 12:09

## 2022-01-01 RX ADMIN — FAMOTIDINE 20 MG: 10 INJECTION, SOLUTION INTRAVENOUS at 08:08

## 2022-01-01 RX ADMIN — ESMOLOL HYDROCHLORIDE 20 MG: 100 INJECTION, SOLUTION INTRAVENOUS at 05:09

## 2022-01-01 RX ADMIN — ACETAMINOPHEN 325MG 650 MG: 325 TABLET ORAL at 12:09

## 2022-01-01 RX ADMIN — MORPHINE SULFATE 4 MG: 4 INJECTION INTRAVENOUS at 09:09

## 2022-01-01 RX ADMIN — CEFEPIME 2 G: 2 INJECTION, POWDER, FOR SOLUTION INTRAVENOUS at 05:08

## 2022-01-01 RX ADMIN — ACETAMINOPHEN 1000 MG: 10 INJECTION, SOLUTION INTRAVENOUS at 09:09

## 2022-01-01 RX ADMIN — GUAIFENESIN 600 MG: 600 TABLET, EXTENDED RELEASE ORAL at 10:09

## 2022-01-01 RX ADMIN — MORPHINE SULFATE 4 MG: 4 INJECTION INTRAVENOUS at 12:09

## 2022-01-01 RX ADMIN — CEFAZOLIN 2 G: 330 INJECTION, POWDER, FOR SOLUTION INTRAMUSCULAR; INTRAVENOUS at 04:09

## 2022-01-01 RX ADMIN — DIPHENHYDRAMINE HYDROCHLORIDE 25 MG: 50 INJECTION, SOLUTION INTRAMUSCULAR; INTRAVENOUS at 09:08

## 2022-01-01 RX ADMIN — DIPHENHYDRAMINE HYDROCHLORIDE 12.5 MG: 50 INJECTION INTRAMUSCULAR; INTRAVENOUS at 08:08

## 2022-01-01 RX ADMIN — FUROSEMIDE 10 MG: 10 INJECTION, SOLUTION INTRAMUSCULAR; INTRAVENOUS at 06:09

## 2022-01-01 RX ADMIN — MORPHINE SULFATE 4 MG: 4 INJECTION INTRAVENOUS at 08:09

## 2022-01-01 RX ADMIN — LIDOCAINE HYDROCHLORIDE 100 MG: 10 INJECTION, SOLUTION INFILTRATION; PERINEURAL at 11:08

## 2022-01-01 RX ADMIN — CEFEPIME 2 G: 2 INJECTION, POWDER, FOR SOLUTION INTRAVENOUS at 02:08

## 2022-01-01 RX ADMIN — IMMUNE GLOBULIN (HUMAN) 40 G: 10 INJECTION INTRAVENOUS; SUBCUTANEOUS at 08:08

## 2022-01-01 RX ADMIN — ACETAMINOPHEN 325MG 650 MG: 325 TABLET ORAL at 06:08

## 2022-01-01 RX ADMIN — GADOBENATE DIMEGLUMINE 20 ML: 529 INJECTION, SOLUTION INTRAVENOUS at 02:08

## 2022-01-01 RX ADMIN — MIDAZOLAM HYDROCHLORIDE 3 MG: 5 INJECTION, SOLUTION INTRAMUSCULAR; INTRAVENOUS at 11:08

## 2022-01-01 RX ADMIN — ACETAMINOPHEN 650 MG: 325 TABLET ORAL at 10:08

## 2022-01-01 RX ADMIN — FUROSEMIDE 10 MG: 10 INJECTION INTRAMUSCULAR; INTRAVENOUS at 06:09

## 2022-01-01 RX ADMIN — ACETAMINOPHEN 325MG 650 MG: 325 TABLET ORAL at 10:09

## 2022-01-01 RX ADMIN — ACETAMINOPHEN 325MG 650 MG: 325 TABLET ORAL at 07:08

## 2022-01-01 RX ADMIN — FAMOTIDINE 20 MG: 10 INJECTION INTRAVENOUS at 09:08

## 2022-01-01 RX ADMIN — PREDNISONE 40 MG: 20 TABLET ORAL at 09:08

## 2022-01-01 RX ADMIN — ACETAMINOPHEN 325MG 650 MG: 325 TABLET ORAL at 06:09

## 2022-01-01 RX ADMIN — THERA TABS 1 TABLET: TAB at 09:09

## 2022-01-01 RX ADMIN — MORPHINE SULFATE 4 MG: 4 INJECTION INTRAVENOUS at 10:09

## 2022-01-01 RX ADMIN — IMMUNE GLOBULIN (HUMAN): 10 INJECTION INTRAVENOUS; SUBCUTANEOUS at 08:08

## 2022-01-01 RX ADMIN — SODIUM CHLORIDE 500 ML: 9 INJECTION, SOLUTION INTRAVENOUS at 11:09

## 2022-01-01 RX ADMIN — ACETAMINOPHEN 325MG 650 MG: 325 TABLET ORAL at 04:08

## 2022-01-01 RX ADMIN — MIDAZOLAM HYDROCHLORIDE 1 MG: 1 INJECTION, SOLUTION INTRAMUSCULAR; INTRAVENOUS at 04:09

## 2022-01-01 RX ADMIN — ACETAMINOPHEN 650 MG: 325 TABLET ORAL at 07:08

## 2022-01-01 RX ADMIN — MEROPENEM 1 G: 1 INJECTION, POWDER, FOR SOLUTION INTRAVENOUS at 04:09

## 2022-01-01 RX ADMIN — THERA TABS 1 TABLET: TAB at 12:08

## 2022-01-01 RX ADMIN — MEROPENEM 1 G: 1 INJECTION, POWDER, FOR SOLUTION INTRAVENOUS at 03:09

## 2022-01-01 RX ADMIN — PROPOFOL 1100 MG: 10 INJECTION, EMULSION INTRAVENOUS at 04:09

## 2022-01-01 RX ADMIN — GUAIFENESIN 600 MG: 600 TABLET, EXTENDED RELEASE ORAL at 12:08

## 2022-01-01 RX ADMIN — ACETAMINOPHEN 650 MG: 325 TABLET ORAL at 02:08

## 2022-01-01 RX ADMIN — CEFEPIME 1 G: 1 INJECTION, POWDER, FOR SOLUTION INTRAMUSCULAR; INTRAVENOUS at 06:08

## 2022-01-01 RX ADMIN — DEXAMETHASONE SODIUM PHOSPHATE 8 MG: 4 INJECTION, SOLUTION INTRA-ARTICULAR; INTRALESIONAL; INTRAMUSCULAR; INTRAVENOUS; SOFT TISSUE at 09:08

## 2022-01-01 RX ADMIN — ACETAMINOPHEN 325MG 650 MG: 325 TABLET ORAL at 11:09

## 2022-01-01 RX ADMIN — BENZONATATE 100 MG: 100 CAPSULE ORAL at 08:09

## 2022-01-01 RX ADMIN — MORPHINE SULFATE 4 MG: 4 INJECTION INTRAVENOUS at 11:09

## 2022-01-01 RX ADMIN — MORPHINE SULFATE 4 MG: 4 INJECTION INTRAVENOUS at 04:09

## 2022-01-01 RX ADMIN — ACETAMINOPHEN 650 MG: 325 TABLET ORAL at 12:08

## 2022-01-01 RX ADMIN — ROCURONIUM BROMIDE 50 MG: 10 SOLUTION INTRAVENOUS at 04:09

## 2022-01-01 RX ADMIN — SODIUM CHLORIDE: 9 INJECTION, SOLUTION INTRAVENOUS at 10:08

## 2022-01-01 RX ADMIN — CEFEPIME 1 G: 1 INJECTION, POWDER, FOR SOLUTION INTRAMUSCULAR; INTRAVENOUS at 09:08

## 2022-01-01 RX ADMIN — SUGAMMADEX 400 MG: 100 INJECTION, SOLUTION INTRAVENOUS at 05:09

## 2022-01-01 RX ADMIN — FAMOTIDINE 20 MG: 20 TABLET, FILM COATED ORAL at 10:09

## 2022-01-01 RX ADMIN — ACETAMINOPHEN 1000 MG: 10 INJECTION, SOLUTION INTRAVENOUS at 03:09

## 2022-01-01 RX ADMIN — IMMUNE GLOBULIN (HUMAN): 10 INJECTION INTRAVENOUS; SUBCUTANEOUS at 09:08

## 2022-01-01 RX ADMIN — FUROSEMIDE 40 MG: 10 INJECTION, SOLUTION INTRAMUSCULAR; INTRAVENOUS at 11:09

## 2022-01-01 RX ADMIN — CEFEPIME 2 G: 2 INJECTION, POWDER, FOR SOLUTION INTRAVENOUS at 10:08

## 2022-01-01 RX ADMIN — ACETAMINOPHEN 325MG 650 MG: 325 TABLET ORAL at 01:08

## 2022-01-01 RX ADMIN — ACETAMINOPHEN 650 MG: 325 TABLET, FILM COATED ORAL at 09:08

## 2022-01-01 RX ADMIN — THERA TABS 1 TABLET: TAB at 08:09

## 2022-01-01 RX ADMIN — ACETAMINOPHEN 650 MG: 325 TABLET ORAL at 04:08

## 2022-04-30 NOTE — PROGRESS NOTES
Patient:   Ronny Cloud            MRN: [id number]            FIN: [id number]               Age:   63 years     Sex:  Male     :  1955   Associated Diagnoses:   None   Author:   Jefferson BAIG, Conchita CAMPOS      Visit Information   Diagnosis: Chronic lymphocytic leukemia - in remission                       Acute ITP    Current Treatment:  Prednisone 100 mg started 18  Previous Treatment: Fludarabine/Rituxan s/p 5 cycles completed 1/30/15    Clinical History: Patient initially referred by his primary care physician in  for evaluation of an abnormal CBC.  WBC  was 18.1 with 65% lymphocytes. Peripheral smear showed leukocytosis comprised of small mature lymphocytes and numerous smudge cells. Flow cytometry showed a monoclonal B-cell population with kappa light chain restriction and coexpression of CD19, CD5 and CD23. The clinical and flow cytometry findings were compatible with a diagnosis of CLL. He had no evidence of B symptoms, adenopathy or splenomegaly. Beta-2 microglobulin was normal at 1.7. Zap 70 expression was positive. CD38 was negative. Observation was recommended. Pneumovax was administered 12. His lymphocyte doubling time had been approximately 2-3 years. Due to progressive leukocytosis and developing symptoms of fatigue and night sweats, treatment was recommended with Fludarabine + Rituxan. He received his first cycle on 9/15/14.  He was intolerant to Bactrim and he was changed to Dapsone for PCP prophylaxis.  ANC lisa 2 weeks out from his second cycle of chemotherapy was 600.  He was treated with a single dose of Neupogen. His third cycle was delayed for one week and treatment was given at a dose reduction of 20 mg/m2 due to delayed neutrophil recovery.  He went on to complete 5 cycles of therapy with Fludarabine/Rituxan.  Cycle 6 was held secondary to cumulative cytopenias despite dose reduction.      His cytopenias persisted after completing chemotherapy.  His Dapsone was  discontinued 4/8/15 as a possible contributing factor.  Follow-up CBCs continued to show leukopenia, neutropenia, mild anemia and thrombocytopenia.  His platelet count improved off of dapsone.  Because of his persistent cytopenias, he underwent a bone marrow aspirate and biopsy 5/7/15.  Marrow was approximately 50% cellular with trilineage hematopoiesis.  There was a population of small round mononuclear cells compatible with lymphocytes present interstitially throughout the marrow representing approximately 25% of the total cellularity.  His bone marrow aspirate was submitted to [facility] for second opinion.  Bone marrow was slightly hypercellular with increased erythropoiesis, adequate but left shifted granulopoiesis and normal megakaryopoiesis.  There were no morphologic or immunophenotypic features of CLL.  Based on the morphology and flow findings, the cytopenias were felt due to chemotherapeutic effect.  Due to his symptoms of recurrent severe arthralgias, laboratory was collected 6/3/15 for Western blot which was negative for Lyme disease. His blood counts gradually recovered except for persistent thrombocytopenia.     He was hospitalized 3/16 for a small bowel obstruction. His symptoms resolved with conservative management and NG tube placement. CT scan of the abdomen and pelvis done at that time showed multiple mildly enlarged mesenteric lymph nodes which appeared reactive. There was mild splenomegaly measuring 14.4 cm in length. He had a large peripelvic cyst of the left kidney 8.8 x 7.4 cm. Screening colonoscopy 7/25/17 showed 2 small 3 mm polyps which were removed. Pathology showed a tubular adenoma and the second polyp was colonic mucosa with a benign lymphoid aggregate. Follow-up exam was recommended in 5 years.    Patient was found to have critical thrombocytopenia with a platelet count of 7000 on routine laboratory performed by his internist 5/9/18.  The remainder of his CBC was normal.  He had no  evidence of bleeding.  [facility]  He had been suffering with shingles for approximately one month.  Laboratory confirmed the presence of antiplatelet antibodies consistent with a diagnosis of acute ITP.  He was placed on prednisone 100 mg by mouth that day.  Repeat CBC 5/11/18 showed improvement in his platelet count to 14,000.      Chief Complaint   What is the platelet count?      Interval History   Patient is here today for a follow-up visit accompanied by his wife.  He remains on prednisone 100 mg daily.  CBC in office today shows that his platelet count has decreased again, now 4000.  He remains asymptomatic.  His blood type is B positive.  He does have his spleen.  The remainder of his CBC remains normal.  His physical exam is remarkable only for a resolving shingles rash involving the buttocks.  He is still experiencing significant neuropathic pain.      Review of Systems   Constitutional:  Fatigue, No significant weight loss, No fever, No chills, No sweats, No weakness.    Eye:  No icterus, No blurring, No visual disturbances.    Ear/Nose/Mouth/Throat:  No nasal congestion, No sore throat.    Respiratory:  No shortness of breath, No cough, No sputum production, No hemoptysis, No wheezing.    Cardiovascular:  No chest pain, No palpitations, No tachycardia.    Gastrointestinal:  No nausea, No vomiting, No diarrhea, No constipation, No abdominal pain.    Genitourinary:  No dysuria, No hematuria, No change in urine stream.    Hematology/Lymphatics:  No bruising tendency, No bleeding tendency, No swollen lymph glands.    Musculoskeletal:  No lower extremity swelling, No back pain, No joint pain.    Integumentary:  Rash, Resolving shingles rash right buttock, No pruritus, No skin lesion.    Neurologic:  Alert and oriented X4, Tingling, Neuropathic pain involving the buttocks, No abnormal balance, No confusion.    Psychiatric:  No anxiety, No depression.       Health Status    Allergies:    Allergic Reactions (Selected)  Severity Not Documented  Bactrim- Nausea, diaphoresis. and vomit.  Sulfa drugs- Vomiting.,    Allergies (2) Active Reaction  Bactrim Vomit  sulfa drugs VOMITING     Current medications:  (Selected)   Prescriptions  Prescribed  Medrol Dosepak 4 mg oral tablet: = 1 packet(s), Oral, As Directed, as directed on package labeling, # 21 tab(s), 0 Refill(s), [id number]  Protonix 40 mg ORAL enteric coated tablet: 40 mg = 1 tab(s), Oral, Daily, # 30 tab(s), 1 Refill(s), [id number]  losartan 100 mg oral tablet: See Instructions, TAKE 1 TABLET BY MOUTH EVERY DAY, # 90 tab(s), 2 Refill(s), eRx: [facility] #[id number]  prednisONE 50 mg oral tablet: See Instructions, 2 tab(s) Oral Daily with food as directed, # 30 tab(s), 1 Refill(s), [id number]      Histories   Past Medical History:    Active  CLL (chronic lymphocytic leukemia) ([id number])   Family History:    Renal cell carcinoma  Mother  Primary malignant neoplasm of prostate  Father     Procedure history:    Colonoscopy ([id number]) on 7/25/2017 at 62 Years.  Biopsy Bone Marrow Aspiration (., None) on 5/8/2015 at 60 Years.  Comments:  5/8/2015 11:45 - Ramon HOWARD, Jackie LOWE  auto-populated from documented surgical case  snake bite on 1/1/2007 at 51 Years.  Comments:  2/12/2015 16:39 - Contributor_system, [email or web address]  03/26/2014 11:03:19 - Tete Vilchis: 2000  Appendectomy on 1/1/2005 at 49 Years.  Umbilical hernia repair 2005 on 1/1/2005 at 49 Years.  Laparoscopic cholecystectomy 1995 on 1/1/1995 at 39 Years.  right arm skin graft - contusion on 1/1/1988 at 32 Years.  Comments:  2/12/2015 16:39 - Contributor_system, [email or web address]  03/26/2014 11:04:29 - Tete Vilchis: 1988~03/26/2014 11:03:59 - Tete Vilchis: 1998  Right arm injury requiring skin graft 1988.  Cholecystectomy ([id number]).  Comments:  2/12/2015 16:39 - Contributor_system,  [email or web address]  03/26/2014 11:02:58 - Tete Vilchis: 1995  Appendectomy ([id number]).  Comments:  2/12/2015 16:39 - Contributor_system, [email or web address]  03/26/2014 11:03:09 - Tete Vilchis: 2005   Social History        Social & Psychosocial Habits    Alcohol  10/26/2013 Risk Assessment: High Risk    08/26/2015  Use: Current    Type: Beer    Comment: social - 08/26/2015 11:33 - Duyen Dye LPN    Employment/School  08/26/2015  Status: Employed    08/26/2015 Risk Assessment: No Risk    Exercise  08/26/2015 Risk Assessment: Does not exercise    03/09/2016  Duration (average number of minutes): 0    Home/Environment  08/26/2015  Lives with: Spouse    08/26/2015 Risk Assessment: No Risk    Nutrition/Health  08/26/2015  Type of diet: Regular    08/26/2015 Risk Assessment: No Risk    Sexual  05/16/2018  Sexually active: Yes    Substance Abuse  10/30/2013 Risk Assessment: Denies Substance Abuse    08/26/2015  Use: Never    Tobacco  10/30/2013 Risk Assessment: Denies Tobacco Use    12/23/2014  Use: Never smoker  .        Physical Examination   Vital Signs   5/16/2018 12:42 CDT      Temperature Oral          36.8 DegC                             Temperature Oral (calculated)             98.24 DegF                             Peripheral Pulse Rate     50 bpm  LOW                             Systolic Blood Pressure   133 mmHg                             Diastolic Blood Pressure  77 mmHg                             Mean Arterial Pressure, Cuff              96 mmHg     Measurements from flowsheet : Measurements   5/16/2018 13:15 CDT      Weight Dosing             122 kg  (Modified)                            Weight Measured           122 kg                             Weight Measured and Calculated in Lbs     268.96 lb  (Modified)                            Height/Length Dosing      185 cm  (Modified)                            Height/Length Measured    185 cm                             BSA Measured              2.5 m2                              Body Mass Index Measured  35.65 kg/m2     General:  Alert and oriented, Well-developed white male in no acute distress.    Eye:  Pupils are equal, round and reactive to light, Extraocular movements are intact, Normal conjunctiva, Sclera nonicteric.    HENT:  Normocephalic, Oral mucosa is moist, No pharyngeal erythema.    Neck:  Supple, Non-tender, No lymphadenopathy.    Respiratory:  Lungs are clear to auscultation, Respirations are non-labored, Breath sounds are equal.    Cardiovascular:  Normal rate, Regular rhythm, No murmur.    Gastrointestinal:  Soft, Non-tender, Non-distended, Normal bowel sounds, No palpable masses or organomegaly.    Lymphatics:  No palpable cervical, supraclavicular, axillary or inguinal adenopathy.    Musculoskeletal:  Normal range of motion, No tenderness, No lower extremity edema.    Integumentary:  Warm, Dry, Significant hyperpigmentation and ecchymosis with dried herpetic rash involving buttocks extending medially to the the perineal and scrotal area.    Neurologic:  Alert, Oriented, Normal sensory, Normal motor function, No focal deficits.    ECOG Performance Scale: 0 - Fully active; no performance restrictions.      Review / Management   Results review:  Lab results   5/16/2018 11:22 CDT      WBC                       7.7 x10(3)/mcL                             RBC                       4.81 x10(6)/mcL                             Hgb                       14.5 gm/dL                             Hct                       40.7 %  LOW                             Platelet                  4 x10(3)/mcL  CRIT                             MCV                       84.6 fL                             MCH                       30.1 pg                             MCHC                      35.6 gm/dL                             RDW                       14.4 %                             MPV                       9.8 fL                             Abs Neut                   4.61 x10(3)/mcL                             NEUT%                     60.2 %  NA                             NEUT#                     4.6 x10(3)/mcL                             LYMPH%                    31.4 %  NA                             LYMPH#                    2.4 x10(3)/mcL                             MONO%                     7.8 %  NA                             MONO#                     0.6 x10(3)/mcL                             EOS%                      0.5 %  NA                             EOS#                      0.0 x10(3)/mcL                             BASO%                     0.1 %  NA                             BASO#                     0.0 x10(3)/mcL  .       Impression and Plan   IMPRESSION:  CLL - in remission  Acute ITP refractory to steroids  Resolving herpes zoster    PLAN:  Patient has acute ITP refractory to initial treatment with prednisone.  Given the fact that he does still have his spleen in place and is of B positive blood type, he is a candidate for additional treatment with WinRho.  Dr. eVlez has recommended that we proceed with treatment today at a dose of 50 mcg/kg IV.  The risks, benefits and toxicities were reviewed and discussed.  Patient wishes to proceed.  He will continue on the prednisone at 100 mg at this time.    Return on Friday 5/18/18 for CBC.  Bleeding precautions ongoing.  All questions answered to the satisfaction of the patient and his wife.    Patient examined and plan of care formulated with Dr. Velez.  TALON GRIGSBY APRN, FNP-C    Other Physicians   Dr. Spencer Guevara ()

## 2022-04-30 NOTE — PROGRESS NOTES
Patient:   Ronny Cloud            MRN: [id number]            FIN: [id number]               Age:   63 years     Sex:  Male     :  1955   Associated Diagnoses:   None   Author:   Jefferson BAIG, Conchita CAMPOS      Patient has now completed 3 weeks of Rituxan for refractory ITP.  His platelet count is stable at 10,000.  He has increased bruising, but no bleeding.  He remains on Prednisone 80 mg daily.  Discussed with Dr. Velez.  Continue current dose of Prednisone.  Bleeding precautions.  Proceed with week #4 Rituxan 61/ with CBC prior.  Maintain followup with Dr. Velez 18.    TRISTA SOLIS, FNP-C

## 2022-04-30 NOTE — PROGRESS NOTES
Patient:   Ronny Cloud            MRN: [id number]            FIN: [id number]               Age:   63 years     Sex:  Male     :  1955   Associated Diagnoses:   None   Author:   Jefferson BAIG, Conchita CAMPOS      Platelet count down to 73,000.  Case discussed with Dr. Velez.  Patient instructed to increase Promacta back to 50 mg daily and he was in agreement.    TRISTA SOLIS, FNP-C    Unable to assess

## 2022-04-30 NOTE — PROGRESS NOTES
Patient:   Ronny Cloud            MRN: [id number]            FIN: [id number]               Age:   63 years     Sex:  Male     :  1955   Associated Diagnoses:   None   Author:   Jefferson BAIG, Conchita CAMPOS      Visit Information   Diagnosis: Chronic lymphocytic leukemia - in remission                       Acute ITP    Current Treatment:  Prednisone 100 mg started 18  Previous Treatment: Fludarabine/Rituxan s/p 5 cycles completed 1/30/15; Win Rho 18    Clinical History: Patient initially referred by his primary care physician in  for evaluation of an abnormal CBC.  WBC  was 18.1 with 65% lymphocytes. Peripheral smear showed leukocytosis comprised of small mature lymphocytes and numerous smudge cells. Flow cytometry showed a monoclonal B-cell population with kappa light chain restriction and coexpression of CD19, CD5 and CD23. The clinical and flow cytometry findings were compatible with a diagnosis of CLL. He had no evidence of B symptoms, adenopathy or splenomegaly. Beta-2 microglobulin was normal at 1.7. Zap 70 expression was positive. CD38 was negative. Observation was recommended. Pneumovax was administered 12. His lymphocyte doubling time had been approximately 2-3 years. Due to progressive leukocytosis and developing symptoms of fatigue and night sweats, treatment was recommended with Fludarabine + Rituxan. He received his first cycle on 9/15/14.  He was intolerant to Bactrim and he was changed to Dapsone for PCP prophylaxis.  ANC lisa 2 weeks out from his second cycle of chemotherapy was 600.  He was treated with a single dose of Neupogen. His third cycle was delayed for one week and treatment was given at a dose reduction of 20 mg/m2 due to delayed neutrophil recovery.  He went on to complete 5 cycles of therapy with Fludarabine/Rituxan.  Cycle 6 was held secondary to cumulative cytopenias despite dose reduction.      His cytopenias persisted after completing chemotherapy.   His Dapsone was discontinued 4/8/15 as a possible contributing factor.  Follow-up CBCs continued to show leukopenia, neutropenia, mild anemia and thrombocytopenia.  His platelet count improved off of dapsone.  Because of his persistent cytopenias, he underwent a bone marrow aspirate and biopsy 5/7/15.  Marrow was approximately 50% cellular with trilineage hematopoiesis.  There was a population of small round mononuclear cells compatible with lymphocytes present interstitially throughout the marrow representing approximately 25% of the total cellularity.  His bone marrow aspirate was submitted to [facility] for second opinion.  Bone marrow was slightly hypercellular with increased erythropoiesis, adequate but left shifted granulopoiesis and normal megakaryopoiesis.  There were no morphologic or immunophenotypic features of CLL.  Based on the morphology and flow findings, the cytopenias were felt due to chemotherapeutic effect.  Due to his symptoms of recurrent severe arthralgias, laboratory was collected 6/3/15 for Western blot which was negative for Lyme disease. His blood counts gradually recovered except for persistent thrombocytopenia.     He was hospitalized 3/16 for a small bowel obstruction. His symptoms resolved with conservative management and NG tube placement. CT scan of the abdomen and pelvis done at that time showed multiple mildly enlarged mesenteric lymph nodes which appeared reactive. There was mild splenomegaly measuring 14.4 cm in length. He had a large peripelvic cyst of the left kidney 8.8 x 7.4 cm. Screening colonoscopy 7/25/17 showed 2 small 3 mm polyps which were removed. Pathology showed a tubular adenoma and the second polyp was colonic mucosa with a benign lymphoid aggregate. Follow-up exam was recommended in 5 years.    Patient was found to have critical thrombocytopenia with a platelet count of 7000 on routine laboratory performed by his internist 5/9/18.  The remainder of his CBC was  "normal.  He had no evidence of bleeding.  [facility]  He had been suffering with shingles for approximately one month.  Laboratory confirmed the presence of antiplatelet antibodies consistent with a diagnosis of acute ITP.  He was placed on prednisone 100 mg by mouth that day and initially platelet count improvement to 14,000.  Serial CBC monitoring showed further worsening of his thrombocytopenia.  Due to his Rh positive blood type, additional treatment was recommended with WinRho, which he received 5/16/18.  Prednisone was continued.  Initially, his platelet count imporved to 25,000.  However, that response was very short lived before his platelet count decreased to 7000.  Additional treatment was recommended with Rituxan should his platelet count decrease further.         Chief Complaint   "Worried about my lab"      Interval History   Patient is here today for a follow-up visit accompanied by his wife.  He continues on Prednisone 100 mg.  He is two weeks out from receiving Win Rho as second line treatment for acute ITP.  Unfortunately, his platelet count has decreased further today to 4000.  He remains entirely asymptomatic, with no evidence of bleeding.  He has mild leukocytosis with a normal differential due to his high dose prednisone therapy.  He has no evidence of anemia.  Dr. Velez has recommended additional treatment with Rituxan weekly ×4 doses.      Review of Systems   Constitutional:  Fatigue, No significant weight loss, Insomnia, No fever, No chills, No sweats, No weakness.    Eye:  No icterus, No blurring, No visual disturbances.    Ear/Nose/Mouth/Throat:  No nasal congestion, No sore throat.    Respiratory:  No shortness of breath, No cough, No sputum production, No hemoptysis, No wheezing.    Cardiovascular:  No chest pain, No palpitations, No tachycardia.    Gastrointestinal:  Constipation, No nausea, No vomiting, No diarrhea, No abdominal pain.  "   Genitourinary:  No dysuria, No hematuria, No change in urine stream.    Hematology/Lymphatics:  No bruising tendency, No bleeding tendency, No swollen lymph glands.    Musculoskeletal:  No lower extremity swelling, No back pain, No joint pain.    Integumentary:  Rash, Resolving shingles rash right buttock, No pruritus, No skin lesion.    Neurologic:  Alert and oriented X4, Tingling, Neuropathic pain involving the buttocks (improved), No abnormal balance, No confusion.    Psychiatric:  No anxiety, No depression.       Health Status   Allergies:    Allergic Reactions (Selected)  Severity Not Documented  Bactrim- Nausea, diaphoresis. and vomit.  Sulfa drugs- Vomiting.,    Allergies (2) Active Reaction  Bactrim Vomit  sulfa drugs VOMITING     Current medications:  (Selected)   Outpatient Medications  Future  Benadryl 50mg/ml Inj: 25 mg, IV Piggyback, Once-chemo, Infuse over: 20 minute(s), first dose 06/05/18 8:00:00 CDT, stop date 06/05/18 8:00:00 CDT, week 2, Future Order  Benadryl 50mg/ml Inj: 25 mg, IV Piggyback, Once-chemo, Infuse over: 20 minute(s), first dose 06/12/18 8:00:00 CDT, stop date 06/12/18 8:00:00 CDT, week 3, Future Order  Benadryl 50mg/ml Inj: 25 mg, IV Piggyback, Once-chemo, Infuse over: 20 minute(s), first dose 06/19/18 9:00:00 CDT, stop date 06/19/18 9:00:00 CDT, week 4, Future Order  Heparin Flush 100 U/mL - 5 mL: 500 units, form: Injection, IV Push, Once-chemo, first dose 06/05/18 8:00:00 CDT, stop date 06/05/18 8:00:00 CDT, week 2, Future Order  Heparin Flush 100 U/mL - 5 mL: 500 units, form: Injection, IV Push, Once-chemo, first dose 06/12/18 8:00:00 CDT, stop date 06/12/18 8:00:00 CDT, week 3, Future Order  Heparin Flush 100 U/mL - 5 mL: 500 units, form: Injection, IV Push, Once-chemo, first dose 06/19/18 9:00:00 CDT, stop date 06/19/18 9:00:00 CDT, week 4, Future Order  Rituxan (for IVPB): 900 mg, form: Injection, IV Piggyback, Once-chemo, Infuse over: 0 hr, first dose 06/05/18 8:20:00 CDT,  stop date 06/05/18 8:20:00 CDT, Future Order, week 2  Rituxan (for IVPB): 900 mg, form: Injection, IV Piggyback, Once-chemo, Infuse over: 0 hr, first dose 06/12/18 8:20:00 CDT, stop date 06/12/18 8:20:00 CDT, Future Order, week 3  Rituxan (for IVPB): 900 mg, form: Injection, IV Piggyback, Once-chemo, Infuse over: 0 hr, first dose 06/19/18 9:20:00 CDT, stop date 06/19/18 9:20:00 CDT, Future Order, week 4  acetaminophen: 650 mg, form: Tab, Oral, Once-chemo, first dose 06/05/18 8:00:00 CDT, stop date 06/05/18 8:00:00 CDT, week 2, Future Order  acetaminophen: 650 mg, form: Tab, Oral, Once-chemo, first dose 06/12/18 8:00:00 CDT, stop date 06/12/18 8:00:00 CDT, week 3, Future Order  acetaminophen: 650 mg, form: Tab, Oral, Once-chemo, first dose 06/19/18 9:00:00 CDT, stop date 06/19/18 9:00:00 CDT, week 4, Future Order  Prescriptions  Prescribed  Protonix 40 mg ORAL enteric coated tablet: 40 mg = 1 tab(s), Oral, Daily, # 30 tab(s), 1 Refill(s), Pharmacy: [facility]/pharmacy #[id number]  losartan 100 mg oral tablet: 100 mg = 1 tab(s), Oral, Daily, # 30 tab(s), 11 Refill(s), Pharmacy: [facility]/pharmacy #[id number]  prednisONE 50 mg oral tablet: See Instructions, 2 tab(s) Oral Daily with food as directed, # 30 tab(s), 1 Refill(s), Pharmacy: [facility]/pharmacy #[id number]      Histories   Past Medical History:    Active  CLL (chronic lymphocytic leukemia) ([id number])   Family History:    Renal cell carcinoma  Mother  Primary malignant neoplasm of prostate  Father     Procedure history:    Colonoscopy ([id number]) on 7/25/2017 at 62 Years.  Biopsy Bone Marrow Aspiration (., None) on 5/8/2015 at 60 Years.  Comments:  5/8/2015 11:45 - Ramon HOWARD, Jackie LOWE  auto-populated from documented surgical case  snake bite on 1/1/2007 at 51 Years.  Comments:  2/12/2015 16:39 - Contributor_system, [facility]  03/26/2014 11:03:19 - Tete Vilchis: 2000  Appendectomy on 1/1/2005 at 49 Years.  Umbilical hernia repair 2005 on 1/1/2005 at 49  Years.  Laparoscopic cholecystectomy 1995 on 1/1/1995 at 39 Years.  right arm skin graft - contusion on 1/1/1988 at 32 Years.  Comments:  2/12/2015 16:39 - Contributor_system, [email or web address]  03/26/2014 11:04:29 - Tete Vilchis: 1988~03/26/2014 11:03:59 - Tete Vilchis: 1998  Right arm injury requiring skin graft 1988.  Cholecystectomy ([id number]).  Comments:  2/12/2015 16:39 - Contributor_system, [email or web address]  03/26/2014 11:02:58 - Tete Vilchis: 1995  Appendectomy ([id number]).  Comments:  2/12/2015 16:39 - Contributor_system, [email or web address]  03/26/2014 11:03:09 - Tete Vilchis: 2005   Social History        Social & Psychosocial Habits    Alcohol  10/26/2013 Risk Assessment: High Risk    08/26/2015  Use: Current    Type: Beer    Comment: social - 08/26/2015 11:33 - Duyen Dye LPN    Employment/School  08/26/2015  Status: Employed    08/26/2015 Risk Assessment: No Risk    Exercise  08/26/2015 Risk Assessment: Does not exercise    03/09/2016  Duration (average number of minutes): 0    Home/Environment  08/26/2015  Lives with: Spouse    08/26/2015 Risk Assessment: No Risk    Nutrition/Health  08/26/2015  Type of diet: Regular    08/26/2015 Risk Assessment: No Risk    Sexual  05/16/2018  Sexually active: Yes    Substance Abuse  10/30/2013 Risk Assessment: Denies Substance Abuse    08/26/2015  Use: Never    Tobacco  10/30/2013 Risk Assessment: Denies Tobacco Use    12/23/2014  Use: Never smoker    05/29/2018  Use: Never smoker  .        Physical Examination   Vital Signs   5/29/2018 9:41 CDT       Temperature Oral          36.8 DegC                             Temperature Oral (calculated)             98.24 DegF                             Peripheral Pulse Rate     50 bpm  LOW                             Systolic Blood Pressure   123 mmHg                             Diastolic Blood Pressure  74 mmHg                             Mean Arterial Pressure, Cuff              90 mmHg     Measurements from  flowsheet : Measurements   5/29/2018 10:16 CDT      Weight Dosing             122.1 kg                             Weight Measured           122.1 kg                             Weight Measured and Calculated in Lbs     269.18 lb                             Weight Measured and Calculated in Lbs     269.18 lb                             Weighing Method           Standing                             Height/Length Dosing      185 cm                             Height/Length Measured    185 cm                             BSA Measured              2.5 m2                             Body Mass Index Measured  35.68 kg/m2                             Ideal Body Weight Calculated              80 kg     General:  Alert and oriented, Well-developed white male in no acute distress.    Eye:  Pupils are equal, round and reactive to light, Extraocular movements are intact, Normal conjunctiva, Sclera nonicteric.    HENT:  Normocephalic, Oral mucosa is moist, No pharyngeal erythema.    Neck:  Supple, Non-tender, No lymphadenopathy.    Respiratory:  Lungs are clear to auscultation, Respirations are non-labored, Breath sounds are equal.    Cardiovascular:  Normal rate, Regular rhythm, No murmur.    Gastrointestinal:  Soft, Non-tender, Non-distended, Normal bowel sounds, No palpable masses or organomegaly.    Lymphatics:  No palpable cervical, supraclavicular, axillary or inguinal adenopathy.    Musculoskeletal:  Normal range of motion, No tenderness, No lower extremity edema.    Integumentary:  Warm, Dry.    Neurologic:  Alert, Oriented, Normal sensory, Normal motor function, No focal deficits.    ECOG Performance Scale: 0 - Fully active; no performance restrictions.      Review / Management   Results review:  Lab results   5/29/2018 8:25 CDT       WBC                       13.7 x10(3)/mcL  HI                             RBC                       4.61 x10(6)/mcL  LOW                             Hgb                       14.3 gm/dL                              Hct                       40.9 %  LOW                             Platelet                  4 x10(3)/mcL  CRIT                             MCV                       88.7 fL                             MCH                       31.0 pg                             MCHC                      35.0 gm/dL                             RDW                       15.6 %                             MPV                       10.7 fL                             Abs Neut                  9.07 x10(3)/mcL                             NEUT%                     66.2 %  NA                             NEUT#                     9.1 x10(3)/mcL                             LYMPH%                    26.2 %  NA                             LYMPH#                    3.6 x10(3)/mcL                             MONO%                     6.8 %  NA                             MONO#                     0.9 x10(3)/mcL                             EOS%                      0.7 %  NA                             EOS#                      0.1 x10(3)/mcL                             BASO%                     0.1 %  NA                             BASO#                     0.0 x10(3)/mcL  .       Impression and Plan   IMPRESSION:  CLL - in remission  Acute ITP - refractory  Resolving herpes zoster  Insomnia s/t steroids    PLAN:  Patient has acute ITP refractory to treatment with both steroids and Win Rho.  Dr. Velez is recommending additional treatment with Rituxan 375 mg/m² weekly ×4 doses.  The risks, benefits and toxicities of Rituxan were reviewed and discussed.  Patient is in agreement and will proceed with his 1st dose today.  He will return on Thursday 5/31/18 for follow-up CBC.  Bleeding precautions.    Plan of care formulated with Dr. Velez.  TRISTA SOLIS, FNP-C    Other Physicians   Dr. Spencer Guevara ()

## 2022-04-30 NOTE — PROGRESS NOTES
Patient:   Ronny Cloud            MRN: [id number]            FIN: [id number]               Age:   63 years     Sex:  Male     :  1955   Associated Diagnoses:   None   Author:   Jefferson BAIG, Conchita CAMPOS      Visit Information   Diagnosis: Chronic lymphocytic leukemia - in remission                    Mild thrombocytopenia - stable    Current Treatment:  Observation  Previous Treatment: Fludarabine/Rituxan s/p 5 cycles completed 1/30/15    Clinical History: Patient initially referred by his primary care physician in  for evaluation of an abnormal CBC.  WBC  was 18.1 with 65% lymphocytes. Peripheral smear showed leukocytosis comprised of small mature lymphocytes and numerous smudge cells. Flow cytometry showed a monoclonal B-cell population with kappa light chain restriction and coexpression of CD19, CD5 and CD23. The clinical and flow cytometry findings were compatible with a diagnosis of CLL. He had no evidence of B symptoms, adenopathy or splenomegaly. Beta-2 microglobulin was normal at 1.7. Zap 70 expression was positive. CD38 was negative. Observation was recommended. Pneumovax was administered 12. His lymphocyte doubling time had been approximately 2-3 years. Due to progressive leukocytosis and developing symptoms of fatigue and night sweats, treatment was recommended with Fludarabine + Rituxan. He received his first cycle on 9/15/14.  He was intolerant to Bactrim and he was changed to Dapsone for PCP prophylaxis.  ANC lisa 2 weeks out from his second cycle of chemotherapy was 600.  He was treated with a single dose of Neupogen. His third cycle was delayed for one week and treatment was given at a dose reduction of 20 mg/m2 due to delayed neutrophil recovery.  He went on to complete 5 cycles of therapy with Fludarabine/Rituxan.  Cycle 6 was held secondary to cumulative cytopenias despite dose reduction.      His cytopenias persisted after completing chemotherapy.  His Dapsone was  discontinued 4/8/15 as a possible contributing factor.  Follow-up CBCs continued to show leukopenia, neutropenia, mild anemia and thrombocytopenia.  His platelet count improved off of dapsone.  Because of his persistent cytopenias, he underwent a bone marrow aspirate and biopsy 5/7/15.  Marrow was approximately 50% cellular with trilineage hematopoiesis.  There was a population of small round mononuclear cells compatible with lymphocytes present interstitially throughout the marrow representing approximately 25% of the total cellularity.  His bone marrow aspirate was submitted to [facility] for second opinion.  Bone marrow was slightly hypercellular with increased erythropoiesis, adequate but left shifted granulopoiesis and normal megakaryopoiesis.  There were no morphologic or immunophenotypic features of CLL.  Based on the morphology and flow findings, the cytopenias were felt due to chemotherapeutic effect.  Due to his symptoms of recurrent severe arthralgias, laboratory was collected 6/3/15 for Western blot which was negative for Lyme disease. His blood counts gradually recovered except for persistent thrombocytopenia.     He was hospitalized 3/16 for a small bowel obstruction. His symptoms resolved with conservative management and NG tube placement. CT scan of the abdomen and pelvis done at that time showed multiple mildly enlarged mesenteric lymph nodes which appeared reactive. There was mild splenomegaly measuring 14.4 cm in length. He had a large peripelvic cyst of the left kidney 8.8 x 7.4 cm. Screening colonoscopy 7/25/17 showed 2 small 3 mm polyps which were removed. Pathology showed a tubular adenoma and the second polyp was colonic mucosa with a benign lymphoid aggregate. Follow-up exam was recommended in 5 years.      Chief Complaint   Here for abnormal platelet count      Interval History   patient is here today for an unscheduled visit for evaluation of an abnormal CBC.  I was contacted by his  internist earlier today after a critical platelet count of 7000 was reported on routine laboratory.  Patient has a signed thrombocytopenia trending 80-90,000.  Patient was asked to come in for verification and examination.  He reports a history of herpes zoster involving the right buttock and scrotum which was initially diagnosed over a month ago.  He was not a candidate for antiviral therapy by the time it was diagnosed.  He was placed on a Medrol Dosepak which she completed.  He has had ongoing neuropathic pain with delayed resolution of his rash.  He initially had fever with the virus which subsided for a week, but has recurred low-grade and in the evening only with some associated sweats.  He is taking Advil daily for management of his temperature and also for pain.  CBC in my office confirms critical thrombocytopenia with a platelet count of 6000.  The remainder of his CBC is normal.  His only other medication is a long-standing antihypertensive agent.  He has not had any evidence of bleeding.  He has a single hemorrhagic bullous lesion on the posterior pharynx.      Review of Systems   Constitutional:  Fever, Fatigue, No significant weight loss, No chills, No sweats, No weakness.    Eye:  No icterus, No blurring, No visual disturbances.    Ear/Nose/Mouth/Throat:  No nasal congestion, No sore throat.    Respiratory:  No shortness of breath, No cough, No sputum production, No hemoptysis, No wheezing.    Cardiovascular:  No chest pain, No palpitations, No tachycardia.    Gastrointestinal:  No nausea, No vomiting, No diarrhea, No constipation, No abdominal pain.    Genitourinary:  No dysuria, No hematuria, No change in urine stream.    Hematology/Lymphatics:  No bruising tendency, No bleeding tendency, No swollen lymph glands.    Musculoskeletal:  No lower extremity swelling, No back pain, No joint pain.    Integumentary:  Rash, Resolving shingles rash right buttock, No pruritus, No skin lesion.    Neurologic:   Alert and oriented X4, Tingling, No abnormal balance, No confusion, No numbness.    Psychiatric:  No anxiety, No depression.       Health Status   Allergies:    Allergic Reactions (Selected)  Severity Not Documented  Bactrim- Nausea, diaphoresis. and vomit.  Sulfa drugs- Vomiting.,    Allergies (2) Active Reaction  Bactrim Vomit  sulfa drugs VOMITING     Current medications:  (Selected)   Prescriptions  Prescribed  Medrol Dosepak 4 mg oral tablet: = 1 packet(s), Oral, As Directed, as directed on package labeling, # 21 tab(s), 0 Refill(s), [id number]  Protonix 40 mg ORAL enteric coated tablet: 40 mg = 1 tab(s), Oral, Daily, # 30 tab(s), 1 Refill(s), Pharmacy: [facility] #[id number]  losartan 100 mg oral tablet: See Instructions, TAKE 1 TABLET BY MOUTH EVERY DAY, # 90 tab(s), 2 Refill(s), eRx: [facility]/pharmacy #[id number]  prednisONE 50 mg oral tablet: See Instructions, 2 tab(s) Oral Daily with food as directed, # 30 tab(s), 1 Refill(s), [id number]      Histories   Past Medical History:    Active  CLL (chronic lymphocytic leukemia) ([id number])   Family History:    Renal cell carcinoma  Mother  Primary malignant neoplasm of prostate  Father     Procedure history:    Colonoscopy ([id number]) on 7/25/2017 at 62 Years.  Biopsy Bone Marrow Aspiration (., None) on 5/8/2015 at 60 Years.  Comments:  5/8/2015 11:45 - Jackie Navarro RN  auto-populated from documented surgical case  snake bite on 1/1/2007 at 51 Years.  Comments:  2/12/2015 16:39 - Contributor_system, [email or web address]  03/26/2014 11:03:19 - Tete Vilchis: 2000  Appendectomy on 1/1/2005 at 49 Years.  Umbilical hernia repair 2005 on 1/1/2005 at 49 Years.  Laparoscopic cholecystectomy 1995 on 1/1/1995 at 39 Years.  right arm skin graft - contusion on 1/1/1988 at 32 Years.  Comments:  2/12/2015 16:39 - Contributor_system, [email or web address]  03/26/2014 11:04:29 - Tete Vilchis: 1988~03/26/2014 11:03:59 - Mame,  Tete: 1998  Right arm injury requiring skin graft 1988.  Cholecystectomy ([id number]).  Comments:  2/12/2015 16:39 - Contributor_system, [email or web address]  03/26/2014 11:02:58 - Tete Vilchis: 1995  Appendectomy ([id number]).  Comments:  2/12/2015 16:39 - Contributor_system, [email or web address]  03/26/2014 11:03:09 - Tete Vilchis: 2005   Social History        Social & Psychosocial Habits    Alcohol  10/26/2013 Risk Assessment: High Risk    08/26/2015  Use: Current    Type: Beer    Comment: social - 08/26/2015 11:33 - Duyen Dye LPN    Employment/School  08/26/2015  Status: Employed    08/26/2015 Risk Assessment: No Risk    Exercise  08/26/2015 Risk Assessment: Does not exercise    03/09/2016  Duration (average number of minutes): 0    Home/Environment  08/26/2015  Lives with: Spouse    08/26/2015 Risk Assessment: No Risk    Nutrition/Health  08/26/2015  Type of diet: Regular    08/26/2015 Risk Assessment: No Risk    Substance Abuse  10/30/2013 Risk Assessment: Denies Substance Abuse    08/26/2015  Use: Never    Tobacco  10/30/2013 Risk Assessment: Denies Tobacco Use    12/23/2014  Use: Never smoker  .        Physical Examination   Vital Signs   5/9/2018 15:12 CDT       Temperature Oral          37.1 DegC                             Temperature Oral (calculated)             98.78 DegF                             Peripheral Pulse Rate     60 bpm                             Systolic Blood Pressure   139 mmHg                             Diastolic Blood Pressure  79 mmHg     Measurements from flowsheet : Measurements   5/9/2018 15:12 CDT       Weight Dosing             122 kg  (Modified)                            Weight Measured           122 kg                             Weight Measured and Calculated in Lbs     268.96 lb  (Modified)                            Height/Length Dosing      185 cm                             Height/Length Measured    185 cm                             BSA Measured              2.5 m2                              Body Mass Index Measured  35.65 kg/m2     General:  Alert and oriented, Well-developed white male in no acute distress.    Eye:  Pupils are equal, round and reactive to light, Extraocular movements are intact, Normal conjunctiva, Sclera nonicteric.    HENT:  Normocephalic, Oral mucosa is moist, No pharyngeal erythema, Bullous hemorrhagic lesion posterior pharynx.    Neck:  Supple, Non-tender, No lymphadenopathy.    Respiratory:  Lungs are clear to auscultation, Respirations are non-labored, Breath sounds are equal.    Cardiovascular:  Normal rate, Regular rhythm, No murmur.    Gastrointestinal:  Soft, Non-tender, Non-distended, Normal bowel sounds, No palpable masses or organomegaly.    Lymphatics:  No palpable cervical, supraclavicular, axillary or inguinal adenopathy.    Musculoskeletal:  Normal range of motion, No tenderness, No lower extremity edema.    Integumentary:  Warm, Dry, Significant hyperpigmentation and ecchymosis with dried herpetic rash involving buttocks extending medially to the the perineal and scrotal area.    Neurologic:  Alert, Oriented, Normal sensory, Normal motor function, No focal deficits.    ECOG Performance Scale: 0 - Fully active; no performance restrictions.      Review / Management   Results review   Laboratory Results   Last 10 Days Lab Results : PowerNote Discrete Results   5/9/2018 13:52 CDT       WBC                       7.6 x10(3)/mcL                             RBC                       4.99 x10(6)/mcL                             Hgb                       15.3 gm/dL                             Hct                       42.3 %                             Platelet                  6 x10(3)/mcL  CRIT                             MCV                       84.8 fL                             MCH                       30.7 pg                             MCHC                      36.2 gm/dL  HI                             RDW                       14.2 %                              MPV                       10.7 fL                             Abs Neut                  3.27 x10(3)/mcL                             NEUT%                     42.8 %  NA                             NEUT#                     3.3 x10(3)/mcL                             LYMPH%                    42.0 %  NA                             LYMPH#                    3.2 x10(3)/mcL                             MONO%                     11.9 %  NA                             MONO#                     0.9 x10(3)/mcL                             EOS%                      2.9 %  NA                             EOS#                      0.2 x10(3)/mcL                             BASO%                     0.4 %  NA                             BASO#                     0.0 x10(3)/mcL    5/9/2018 7:34 CDT        WBC                       5.8 x10(3)/mcL                             RBC                       5.16 x10(6)/mcL                             Hgb                       15.5 gm/dL                             Hct                       44.3 %                             Platelet                  7 x10(3)/mcL  CRIT                             MCV                       85.9 fL                             MCH                       30.0 pg                             MCHC                      35.0 gm/dL                             RDW                       13.3 %                             MPV                       10.4 fL                             Abs Neut                  2.09 x10(3)/mcL  LOW                             Neutro Auto               36 %  NA                             Lymph Auto                48 %  NA                             Mono Auto                 11 %  NA                             Eos Auto                  4 %  NA                             Abs Eos                   0.2 x10(3)/mcL                             Basophil Auto             1 %  NA                             Abs Neutro                 2.09 x10(3)/mcL                             Abs Lymph                 2.8 x10(3)/mcL                             Abs Mono                  0.6 x10(3)/mcL                             Abs Baso                  0.0 x10(3)/mcL                             Platelet Est              Decreased                             Anisocyte                 2  NA                             Microcyte                 2  NA                             Sodium Lvl                143 mmol/L                             Potassium Lvl             4.4 mmol/L                             Chloride                  108 mmol/L  HI                             CO2                       28.0 mmol/L                             Calcium Lvl               8.8 mg/dL                             Glucose Lvl               105 mg/dL                             BUN                       23.0 mg/dL  HI                             Creatinine                0.92 mg/dL                             eGFR-AA                   >60 mL/min/1.73 m2  NA                             eGFR-FELICITAS                  >60 mL/min/1.73 m2  NA                             Bili Total                1.1 mg/dL  HI                             Bili Direct               0.20 mg/dL                             Bili Indirect             0.90 mg/dL  HI                             AST                       13 unit/L  LOW                             ALT                       34 unit/L                             Alk Phos                  57 unit/L                             Total Protein             6.9 gm/dL                             Albumin Lvl               4.10 gm/dL                             Globulin                  2.80 gm/dL                             A/G Ratio                 1.5  NA                             Chol                      140 mg/dL                             HDL                       38 mg/dL                             Trig                      135 mg/dL                              LDL                       75 mg/dL                             Chol/HDL                  3.7                             VLDL                      27 mg/dL  NA           Impression and Plan   IMPRESSION:  CLL - in remission  Critical thrombocytopenia - probable ITP  Resolving herpes zoster    PLAN:  Patient has critical thrombocytopenia and clinical picture is consistent with acute ITP due to his recent viral infection.  He will be started on prednisone 1 mg/kg = 100 mg daily with food.  Protonix 40 mg daily while on the steroids.  Antiplatelet antibodies drawn today.  Bleeding precautions provided.  Patient instructed to avoid aspirin and NSAIDs.  Repeat CBC BC on Friday 5/11/18.    Patient examined and plan of care formulated with Dr. Velez.  TALON GRIGSBY, APRN, FNP-C    Other Physicians   Dr. Spencer Guevara ()

## 2022-04-30 NOTE — PROGRESS NOTES
Patient:   Ronny Cloud            MRN: [id number]            FIN: [id number]               Age:   62 years     Sex:  Male     :  1955   Associated Diagnoses:   None   Author:   José Velez MD      Visit Information   Diagnosis: Chronic lymphocytic leukemia - in remission                    Mild thrombocytopenia - stable    Current Treatment:  Observation  Previous Treatment: Fludarabine/Rituxan s/p 5 cycles completed 1/30/15    Clinical History: Patient initially referred by his primary care physician in  for evaluation of an abnormal CBC.  WBC  was 18.1 with 65% lymphocytes. Peripheral smear showed leukocytosis comprised of small mature lymphocytes and numerous smudge cells. Flow cytometry showed a monoclonal B-cell population with kappa light chain restriction and coexpression of CD19, CD5 and CD23. The clinical and flow cytometry findings were compatible with a diagnosis of CLL. He had no evidence of B symptoms, adenopathy or splenomegaly. Beta-2 microglobulin was normal at 1.7. Zap 70 expression was positive. CD38 was negative. Observation was recommended. Pneumovax was administered 12. His lymphocyte doubling time had been approximately 2-3 years. Due to progressive leukocytosis and developing symptoms of fatigue and night sweats, treatment was recommended with Fludarabine + Rituxan. He received his first cycle on 9/15/14.  He was intolerant to Bactrim and he was changed to Dapsone for PCP prophylaxis.  ANC lisa 2 weeks out from his second cycle of chemotherapy was 600.  He was treated with a single dose of Neupogen. His third cycle was delayed for one week and treatment was given at a dose reduction of 20 mg/m2 due to delayed neutrophil recovery.  He went on to complete 5 cycles of therapy with Fludarabine/Rituxan.  Cycle 6 was held secondary to cumulative cytopenias despite dose reduction.      His cytopenias persisted after completing chemotherapy.  His Dapsone was  discontinued 4/8/15 as a possible contributing factor.  Follow-up CBCs continued to show leukopenia, neutropenia, mild anemia and thrombocytopenia.  His platelet count improved off of dapsone.  Because of his persistent cytopenias, he underwent a bone marrow aspirate and biopsy 5/7/15.  Marrow was approximately 50% cellular with trilineage hematopoiesis.  There was a population of small round mononuclear cells compatible with lymphocytes present interstitially throughout the marrow representing approximately 25% of the total cellularity.  His bone marrow aspirate was submitted to [facility] for second opinion.  Bone marrow was slightly hypercellular with increased erythropoiesis, adequate but left shifted granulopoiesis and normal megakaryopoiesis.  There were no morphologic or immunophenotypic features of CLL.  Based on the morphology and flow findings, the cytopenias were felt due to chemotherapeutic effect.  Due to his symptoms of recurrent severe arthralgias, laboratory was collected 6/3/15 for Western blot which was negative for Lyme disease. His blood counts gradually recovered except for persistent thrombocytopenia.     He was hospitalized 3/16 for a small bowel obstruction. His symptoms resolved with conservative management and NG tube placement. CT scan of the abdomen and pelvis done at that time showed multiple mildly enlarged mesenteric lymph nodes which appeared reactive. There was mild splenomegaly measuring 14.4 cm in length. He had a large peripelvic cyst of the left kidney 8.8 x 7.4 cm.      Chief Complaint   I feel fine      Interval History   He returns to clinic today for a four-month follow-up visit accompanied by his wife. He continues to do well. He has a good energy level and performance status. He has no fever, chills, night sweats or weight loss. He has had no recent illnesses or infections. His white count is 5.5 today with a normal differential. He has no significant anemia and stable  mild thrombocytopenia. He denies any abnormal bruising or bleeding. He has no palpable peripheral adenopathy on physical exam. Ultrasound of the kidneys showed a stable left parapelvic renal cyst unchanged from his previous CT scan.      Review of Systems   Constitutional:  Weight stable, No fever, No chills, No sweats, No weakness, No fatigue.    Eye:  No icterus, No blurring, No visual disturbances.    Ear/Nose/Mouth/Throat:  No nasal congestion, No sore throat.    Respiratory:  No shortness of breath, No cough, No sputum production, No hemoptysis, No wheezing.    Cardiovascular:  No chest pain, No palpitations, No tachycardia.    Gastrointestinal:  No nausea, No vomiting, No diarrhea, No constipation, No abdominal pain.    Genitourinary:  No dysuria, No hematuria, No change in urine stream.    Hematology/Lymphatics:  No bruising tendency, No bleeding tendency, No swollen lymph glands.    Musculoskeletal:  No lower extremity swelling, No back pain, No joint pain.    Integumentary:  No rash, No pruritus, No skin lesion.    Neurologic:  Alert and oriented X4, No abnormal balance, No confusion, No numbness, No tingling.    Psychiatric:  No anxiety, No depression.       Health Status   Current medications:  (Selected)   Prescriptions  Prescribed  losartan 100 mg oral tablet: See Instructions, TAKE 1 TABLET BY MOUTH EVERY DAY, # 90 tab(s), 2 Refill(s), eRx: [facility]pharmacy #[id number]      Histories   Past Medical History:    Active  CLL (chronic lymphocytic leukemia) ([id number])   Family History:    Renal cell carcinoma  Mother  Primary malignant neoplasm of prostate  Father     Procedure history:    Biopsy Bone Marrow Aspiration (., None) on 5/8/2015 at 60 Years.  Comments:  5/8/2015 11:45 - Ramon HOWARD, Jackie LOWE  auto-populated from documented surgical case  snake bite on 1/1/2007 at 51 Years.  Comments:  2/12/2015 16:39 - Contributor_system, [facility]  03/26/2014 11:03:19 - Tete Vilchis:  2000  Appendectomy on 1/1/2005 at 49 Years.  Umbilical hernia repair 2005 on 1/1/2005 at 49 Years.  Laparoscopic cholecystectomy 1995 on 1/1/1995 at 39 Years.  right arm skin graft - contusion on 1/1/1988 at 32 Years.  Comments:  2/12/2015 16:39 - Contributor_system, [facility]_SYS  03/26/2014 11:04:29 - Tete Vilchis: 1988~03/26/2014 11:03:59 - Tete Vilchis: 1998  Right arm injury requiring skin graft 1988.  Cholecystectomy ([id number]).  Comments:  2/12/2015 16:39 - Contributor_system, [facility]  03/26/2014 11:02:58 - Tete Vilchis: 1995  Appendectomy ([id number]).  Comments:  2/12/2015 16:39 - Contributor_system, [facility]_SYS  03/26/2014 11:03:09 - Tete Vilchis: 2005   Social History        Social & Psychosocial Habits    Alcohol  10/26/2013 Risk Assessment: High Risk    08/26/2015  Use: Current    Type: Beer    Comment: social - 08/26/2015 11:33 - Duyen Dye LPN    Employment/School  08/26/2015  Status: Employed    08/26/2015 Risk Assessment: No Risk    Exercise  08/26/2015 Risk Assessment: Does not exercise    03/09/2016  Duration (average number of minutes): 0    Home/Environment  08/26/2015  Lives with: Spouse    08/26/2015 Risk Assessment: No Risk    Nutrition/Health  08/26/2015  Type of diet: Regular    08/26/2015 Risk Assessment: No Risk    Substance Abuse  10/30/2013 Risk Assessment: Denies Substance Abuse    08/26/2015  Use: Never    Tobacco  10/30/2013 Risk Assessment: Denies Tobacco Use    12/23/2014  Use: Never smoker  .        Physical Examination   Vital Signs   6/14/2017 10:00 CDT      Temperature Oral          36.7 DegC                             Peripheral Pulse Rate     51 bpm  LOW                             Systolic Blood Pressure   113 mmHg                             Diastolic Blood Pressure  72 mmHg     General:  Alert and oriented, Well-developed white male in no acute distress.    Eye:  Pupils are equal, round and reactive to light, Extraocular movements are intact,  Normal conjunctiva, Sclera nonicteric.    HENT:  Normocephalic, Oral mucosa is moist, No pharyngeal erythema.    Neck:  Supple, Non-tender, No lymphadenopathy.    Respiratory:  Lungs are clear to auscultation, Respirations are non-labored, Breath sounds are equal.    Cardiovascular:  Normal rate, Regular rhythm, No murmur.    Gastrointestinal:  Soft, Non-tender, Non-distended, Normal bowel sounds, No palpable masses or splenomegaly.    Lymphatics:  No palpable peripheral adenopathy.    Musculoskeletal:  Normal range of motion, No tenderness, No lower extremity edema.    Integumentary:  Warm, Dry, No rash.    Neurologic:  Alert, Oriented, Normal sensory, Normal motor function, No focal deficits.    ECOG Performance Scale: 0 - Fully active; no performance restrictions.      Review / Management   Laboratory Results   Today's Lab Results : PowerNote Discrete Results   6/14/2017 9:52 CDT       WBC                       5.5 x10(3)/mcL                             RBC                       5.02 x10(6)/mcL                             Hgb                       15.7 gm/dL                             Hct                       43.3 %                             Platelet                  93 x10(3)/mcL  LOW                             MCV                       86.3 fL                             MCH                       31.3 pg  HI                             MCHC                      36.3 gm/dL  HI                             RDW                       13.1 %                             MPV                       9.7 fL                             Abs Neut                  2.33 x10(3)/mcL                             NEUT%                     42.6 %  NA                             NEUT#                     2.3 x10(3)/mcL                             LYMPH%                    37.4 %  NA                             LYMPH#                    2.0 x10(3)/mcL                             MONO%                     14.5 %  NA                              MONO#                     0.8 x10(3)/mcL                             EOS%                      5.1 %  NA                             EOS#                      0.3 x10(3)/mcL                             BASO%                     0.4 %  NA                             BASO#                     0.0 x10(3)/mcL           Impression and Plan   IMPRESSION:  CLL - in remission  Mild thrombocytopenia    PLAN:  Patient continues to do well with no clinical or laboratory findings to indicate recurrence of his CLL.   He has stable mild thrombocytopenia.   Follow-up ultrasound shows a large stable parapelvic left renal cyst which has been documented back to 2005. Patient was reassured this is a benign finding.  He will return in 6 months for a follow-up visit, clinical exam and repeat laboratory.      DUKE LYNCH MD    Other Physicians   Dr. Spencer Guevara ()

## 2022-06-15 PROBLEM — C91.12 CLL (CHRONIC LYMPHOID LEUKEMIA) IN RELAPSE: Status: ACTIVE | Noted: 2022-01-01

## 2022-06-15 NOTE — TELEPHONE ENCOUNTER
----- Message from Chelsie Wray sent at 6/15/2022  9:36 AM CDT -----  Regarding: RE: marcie vega 6/16 @ 11  Pt called to cancel ashutosh   ----- Message -----  From: Nir Page LPN  Sent: 6/8/2022   4:17 PM CDT  To: Chelsie Wray  Subject: marcie vega 6/16 @ 11                            Are there any outstanding tasks in patient chart? Needs fasting labs    Is there documentation of outstanding tasks in patient chart? no    Has patient been to the ER, urgent care, or another physician since last visit?    Has patient done any blood work or x-rays since last visit?

## 2022-06-15 NOTE — PROGRESS NOTES
Subjective:       Patient ID: Ronny Cloud is a 67 y.o. male.    Chief Complaint: Bruising from medication, improved    Diagnosis: Chronic ITP                  Recurrent chronic lymphocytic leukemia       + COVID-19 Vaccination    Current Treatment:  Promacta 50 mg/d (started 6/24/18); Acalabrutinib 100 mg BID started 4/25/22  Previous Treatment: Fludarabine/Rituxan x 5 cycles completed 1/30/15; Win Rho 5/16/18; Weekly Rituxan x4 (6/19/18); Prednisone 6/18-8/18    Clinical History: Patient initially referred by his primary care physician in 2009 for evaluation of an abnormal CBC.  WBC 12/08 was 18.1 with 65% lymphocytes. Peripheral smear showed leukocytosis comprised of small mature lymphocytes and numerous smudge cells. Flow cytometry showed a monoclonal B-cell population with kappa light chain restriction and coexpression of CD19, CD5 and CD23. The clinical and flow cytometry findings were compatible with a diagnosis of CLL. He had no evidence of B symptoms, adenopathy or splenomegaly. Beta-2 microglobulin was normal at 1.7. Zap 70 expression was positive. CD38 was negative. Observation was recommended. Pneumovax was administered 12/4/12. His lymphocyte doubling time had been approximately 2-3 years. Due to progressive leukocytosis and developing symptoms of fatigue and night sweats, treatment was recommended with Fludarabine + Rituxan. He received his first cycle on 9/15/14.  He was intolerant to Bactrim and he was changed to Dapsone for PCP prophylaxis.  ANC lisa 2 weeks out from his second cycle of chemotherapy was 600.  He was treated with a single dose of Neupogen. His third cycle was delayed for one week and treatment was given at a dose reduction of 20 mg/m2 due to delayed neutrophil recovery.  He went on to complete 5 cycles of therapy with Fludarabine/Rituxan.  Cycle 6 was held secondary to cumulative cytopenias despite dose reduction.      His cytopenias persisted after completing chemotherapy.  His  Dapsone was discontinued 4/8/15 as a possible contributing factor.  Follow-up CBCs continued to show leukopenia, neutropenia, mild anemia and thrombocytopenia.  His platelet count improved off of dapsone.  Because of his persistent cytopenias, he underwent a bone marrow aspirate and biopsy 5/7/15.  Marrow was approximately 50% cellular with trilineage hematopoiesis.  There was a population of small round mononuclear cells compatible with lymphocytes present interstitially throughout the marrow representing approximately 25% of the total cellularity.  His bone marrow aspirate was submitted to [facility] for second opinion.  Bone marrow was slightly hypercellular with increased erythropoiesis, adequate but left shifted granulopoiesis and normal megakaryopoiesis.  There were no morphologic or immunophenotypic features of CLL.  Based on the morphology and flow findings, the cytopenias were felt due to chemotherapeutic effect.  Due to his symptoms of recurrent severe arthralgias, laboratory was collected 6/3/15 for Western blot which was negative for Lyme disease. His blood counts gradually recovered except for persistent thrombocytopenia.     He was hospitalized 3/16 for a small bowel obstruction. His symptoms resolved with conservative management and NG tube placement. CT scan of the abdomen and pelvis done at that time showed multiple mildly enlarged mesenteric lymph nodes which appeared reactive. There was mild splenomegaly measuring 14.4 cm in length. He had a large peripelvic cyst of the left kidney 8.8 x 7.4 cm. Screening colonoscopy 7/25/17 showed 2 small 3 mm polyps which were removed. Pathology showed a tubular adenoma and the second polyp was colonic mucosa with a benign lymphoid aggregate. Follow-up exam was recommended in 5 years.    Patient was found to have critical thrombocytopenia with a platelet count of 7000 on routine laboratory performed by his internist 5/9/18.  The remainder of his CBC was normal.   He had no evidence of bleeding.  [facility]  He had been suffering with shingles for approximately one month.  Laboratory confirmed the presence of antiplatelet antibodies consistent with a diagnosis of acute ITP.  He was placed on prednisone 100 mg by mouth that day and initially platelet count improvement to 14,000.  Serial CBC monitoring showed further worsening of his thrombocytopenia.  Due to his Rh positive blood type, additional treatment was recommended with WinRho, which he received 5/16/18.  Prednisone was continued. Initially, his platelet count improved to 25,000.  However, that response was very short lived before his platelet count decreased to 7000.  Additional treatment was recommended with Rituxan weekly x4 weeks, and that was initiated 5/29/18.  He completed 4 weeks of Rituxan therapy 6/19/18 with no improvement in his thrombocytopenia.  Promacta was initiated 6/24/18.  Antiplatelet antibodies were repeated 6/12/18 and were negative.  Platelet count improved to over 100,000 on Promacta. Prednisone was tapered and discontinued by 8/20/18.     Platelet count showed gradual ongoing improvement.  During followup 1/7/19, platelet count was normal at 216,000.  Promacta was weaned to 25 mg.  Unfortunately, he developed recurrent thrombocytopenia with a platelet count of 73,000 and his Promacta was increased back to 50 mg 1/19. Laboratory testing 12/3/19 showed an increase in his total WBC count 15.3 with an absolute lymphocyte count of 9.2. Peripheral smear showed small mature lymphocytes compatible with CLL. He had no significant anemia or thrombocytopenia. Treatment was continued with Promacta. Observation was recommended for his recurrent CLL.  His absolute lymphocytosis showed gradual increase without associated symptoms, progressive anemia or thrombocytopenia.  Treatment was recommended with Acalabrutinib 100 mg BID.         Interval History  Mr. Cloud is here  today in follow-up accompanied by his wife.  He initiated treatment with Acalabrutinib 100 mg BID on 4/25/22.  He has been off of Promacta since mid April.  He had progression of his ALC as expected after starting the Acalabrutinib, and that is improved today.  His platelet count shows interval decrease to 68,000.  He had increased bruising when he initially started the medication, but that has improved.  No bleeding.  He has no palpable adenopathy and is not experiencing sweats.  He is not experiencing any GI related side effects from his Acalabrutinib.  He underwent an echocardiogram recently after an x-ray revealed cardiomegaly.  Echocardiogram was normal and reviewed today in preparation for this visit.    Review of patient's allergies indicates:   Allergen Reactions    Sulfa (sulfonamide antibiotics)      Other reaction(s): VOMITING    Sulfamethoxazole-trimethoprim Nausea Only and Nausea And Vomiting     Other reaction(s): Diaphoresis.      Review of Systems   Constitutional: Negative for appetite change and unexpected weight change.   HENT: Negative for mouth sores.    Eyes: Negative for visual disturbance.   Respiratory: Negative for cough and shortness of breath.    Cardiovascular: Negative for chest pain.   Gastrointestinal: Negative for abdominal pain and diarrhea.   Genitourinary: Negative for frequency.   Musculoskeletal: Negative for back pain.   Integumentary:  Negative for rash.   Neurological: Negative for headaches.   Hematological: Negative for adenopathy. Bruises/bleeds easily (improved).   Psychiatric/Behavioral: The patient is not nervous/anxious.          PMHx:   CLL, ITP, HTN    PSHx: Appendectomy, Laparoscopic Cholecystectomy, R arm skin graft, Bone marrow biopsy, Umbilical hernia repair, Colonoscopy 7/17    SH: Lifetime nonsmoker, + Social alcohol use, Lives in [place] with his wife    FH: Father had prostate cancer, mother had renal cell carcinoma     Objective:     Vitals:    06/16/22  0904   BP: 128/85   Pulse: 77   Resp: 18   Temp: 96.8 °F (36 °C)         Physical Exam  Constitutional:       Appearance: Normal appearance.   HENT:      Head: Normocephalic and atraumatic.      Nose: Nose normal.      Mouth/Throat:      Mouth: Mucous membranes are moist.      Pharynx: No oropharyngeal exudate.   Eyes:      General: No scleral icterus.     Conjunctiva/sclera: Conjunctivae normal.      Pupils: Pupils are equal, round, and reactive to light.   Cardiovascular:      Rate and Rhythm: Normal rate and regular rhythm.   Pulmonary:      Effort: Pulmonary effort is normal.      Breath sounds: Normal breath sounds.   Abdominal:      General: Abdomen is flat. Bowel sounds are normal.   Musculoskeletal:         General: Normal range of motion.      Cervical back: Normal range of motion and neck supple.   Skin:     General: Skin is warm and dry.      Findings: Bruising present.   Neurological:      General: No focal deficit present.      Mental Status: He is alert and oriented to person, place, and time.   Psychiatric:         Mood and Affect: Mood normal.         Behavior: Behavior normal.       ECOG SCORE    0 - Fully active-able to carry on all pre-disease performance without restriction            Recent Results (from the past 336 hour(s))   CBC with Differential    Collection Time: 06/16/22  8:57 AM   Result Value Ref Range    WBC 46.9 (H) 4.5 - 11.5 x10(3)/mcL    RBC 4.89 4.70 - 6.10 x10(6)/mcL    Hgb 14.9 14.0 - 18.0 gm/dL    Hct 42.8 42.0 - 52.0 %    MCV 87.5 80.0 - 94.0 fL    MCH 30.5 27.0 - 31.0 pg    MCHC 34.8 33.0 - 36.0 mg/dL    RDW 14.2 11.5 - 17.0 %    Platelet 68 (L) 130 - 400 x10(3)/mcL    MPV 10.6 9.4 - 12.4 fL    Neut % 7.4 %    Lymph % 89.5 %    Mono % 2.4 %    Eos % 0.4 %    Basophil % 0.2 %    Lymph # 41.97 (H) 0.6 - 4.6 x10(3)/mcL    Neut # 3.5 2.1 - 9.2 x10(3)/mcL    Mono # 1.11 0.1 - 1.3 x10(3)/mcL    Eos # 0.17 0 - 0.9 x10(3)/mcL    Baso # 0.09 0 - 0.2 x10(3)/mcL    IG# 0.05 (H) 0 -  0.0155 x10(3)/mcL    IG% 0.1 0 - 0.43 %        Assessment:   Relapsed CLL  Chronic ITP        Plan:   Continue acalabrutinib 100 mg b.i.d..  Continue to hold Promacta.  CBC monitoring every 2 weeks.  If platelet count drops below 50,000, resume Promacta 50 mg daily.  Return to clinic in 6 weeks for follow-up visit, or sooner if needed.  All questions answered to the satisfaction of the patient and his wife.    TRISTA SOLIS, FNP-C    Other physicians:  Dr. Spencer Guevara ()

## 2022-06-16 PROBLEM — D69.3 CHRONIC ITP (IDIOPATHIC THROMBOCYTOPENIA): Status: ACTIVE | Noted: 2022-01-01

## 2022-07-28 NOTE — PATIENT INSTRUCTIONS
Continue Acalabrutinib 100 mg twice daily.  Resume Promacta at 50 mg daily.  Consider taking it mid-day.

## 2022-07-28 NOTE — PROGRESS NOTES
Subjective:       Patient ID: Ronny Cloud is a 67 y.o. male.    Chief Complaint: No chief complaint on file.      Review of patient's allergies indicates:   Allergen Reactions    Sulfa (sulfonamide antibiotics)      Other reaction(s): VOMITING    Sulfamethoxazole-trimethoprim Nausea Only and Nausea And Vomiting     Other reaction(s): Diaphoresis.      Review of Systems   Constitutional: Negative for appetite change and unexpected weight change.   HENT: Negative for mouth sores.    Eyes: Negative for visual disturbance.   Respiratory: Negative for cough and shortness of breath.    Cardiovascular: Negative for chest pain.   Gastrointestinal: Negative for abdominal pain and diarrhea.   Genitourinary: Negative for frequency.   Musculoskeletal: Negative for back pain.   Integumentary:  Negative for rash.   Neurological: Negative for headaches.   Hematological: Negative for adenopathy.   Psychiatric/Behavioral: The patient is not nervous/anxious.            Objective:   There were no vitals filed for this visit.      Physical Exam  ECOG SCORE              @RESULTCNT(1W)@   Assessment:               Plan:       ***

## 2022-07-28 NOTE — PROGRESS NOTES
Subjective:       Patient ID: Ronny Cloud is a 67 y.o. male.    Chief Complaint: Worried about decreasing platelet count    Diagnosis: Chronic ITP                  Recurrent chronic lymphocytic leukemia       + COVID-19 Vaccination    Current Treatment:  Promacta 50 mg/d (started 6/24/18 ON HOLD); Acalabrutinib 100 mg BID started 4/25/22  Previous Treatment: Fludarabine/Rituxan x 5 cycles completed 1/30/15; Win Rho 5/16/18; Weekly Rituxan x4 (6/19/18); Prednisone 6/18-8/18    Clinical History: Patient initially referred by his primary care physician in 2009 for evaluation of an abnormal CBC.  WBC 12/08 was 18.1 with 65% lymphocytes. Peripheral smear showed leukocytosis comprised of small mature lymphocytes and numerous smudge cells. Flow cytometry showed a monoclonal B-cell population with kappa light chain restriction and coexpression of CD19, CD5 and CD23. The clinical and flow cytometry findings were compatible with a diagnosis of CLL. He had no evidence of B symptoms, adenopathy or splenomegaly. Beta-2 microglobulin was normal at 1.7. Zap 70 expression was positive. CD38 was negative. Observation was recommended. Pneumovax was administered 12/4/12. His lymphocyte doubling time had been approximately 2-3 years. Due to progressive leukocytosis and developing symptoms of fatigue and night sweats, treatment was recommended with Fludarabine + Rituxan. He received his first cycle on 9/15/14.  He was intolerant to Bactrim and he was changed to Dapsone for PCP prophylaxis.  ANC lisa 2 weeks out from his second cycle of chemotherapy was 600.  He was treated with a single dose of Neupogen. His third cycle was delayed for one week and treatment was given at a dose reduction of 20 mg/m2 due to delayed neutrophil recovery.  He went on to complete 5 cycles of therapy with Fludarabine/Rituxan.  Cycle 6 was held secondary to cumulative cytopenias despite dose reduction.      His cytopenias persisted after completing  chemotherapy.  His Dapsone was discontinued 4/8/15 as a possible contributing factor.  Follow-up CBCs continued to show leukopenia, neutropenia, mild anemia and thrombocytopenia.  His platelet count improved off of dapsone.  Because of his persistent cytopenias, he underwent a bone marrow aspirate and biopsy 5/7/15.  Marrow was approximately 50% cellular with trilineage hematopoiesis.  There was a population of small round mononuclear cells compatible with lymphocytes present interstitially throughout the marrow representing approximately 25% of the total cellularity.  His bone marrow aspirate was submitted to [facility] for second opinion.  Bone marrow was slightly hypercellular with increased erythropoiesis, adequate but left shifted granulopoiesis and normal megakaryopoiesis.  There were no morphologic or immunophenotypic features of CLL.  Based on the morphology and flow findings, the cytopenias were felt due to chemotherapeutic effect.  Due to his symptoms of recurrent severe arthralgias, laboratory was collected 6/3/15 for Western blot which was negative for Lyme disease. His blood counts gradually recovered except for persistent thrombocytopenia.     He was hospitalized 3/16 for a small bowel obstruction. His symptoms resolved with conservative management and NG tube placement. CT scan of the abdomen and pelvis done at that time showed multiple mildly enlarged mesenteric lymph nodes which appeared reactive. There was mild splenomegaly measuring 14.4 cm in length. He had a large peripelvic cyst of the left kidney 8.8 x 7.4 cm. Screening colonoscopy 7/25/17 showed 2 small 3 mm polyps which were removed. Pathology showed a tubular adenoma and the second polyp was colonic mucosa with a benign lymphoid aggregate. Follow-up exam was recommended in 5 years.    Patient was found to have critical thrombocytopenia with a platelet count of 7000 on routine laboratory performed by his internist 5/9/18.  The remainder of  his CBC was normal.  He had no evidence of bleeding.  [facility]  He had been suffering with shingles for approximately one month.  Laboratory confirmed the presence of antiplatelet antibodies consistent with a diagnosis of acute ITP.  He was placed on prednisone 100 mg by mouth that day and initially platelet count improvement to 14,000.  Serial CBC monitoring showed further worsening of his thrombocytopenia.  Due to his Rh positive blood type, additional treatment was recommended with WinRho, which he received 5/16/18.  Prednisone was continued. Initially, his platelet count improved to 25,000.  However, that response was very short lived before his platelet count decreased to 7000.  Additional treatment was recommended with Rituxan weekly x4 weeks, and that was initiated 5/29/18.  He completed 4 weeks of Rituxan therapy 6/19/18 with no improvement in his thrombocytopenia.  Promacta was initiated 6/24/18.  Antiplatelet antibodies were repeated 6/12/18 and were negative.  Platelet count improved to over 100,000 on Promacta. Prednisone was tapered and discontinued by 8/20/18.     Platelet count showed gradual ongoing improvement.  During followup 1/7/19, platelet count was normal at 216,000.  Promacta was weaned to 25 mg.  Unfortunately, he developed recurrent thrombocytopenia with a platelet count of 73,000 and his Promacta was increased back to 50 mg 1/19. Laboratory testing 12/3/19 showed an increase in his total WBC count 15.3 with an absolute lymphocyte count of 9.2. Peripheral smear showed small mature lymphocytes compatible with CLL. He had no significant anemia or thrombocytopenia. Treatment was continued with Promacta. Observation was recommended for his recurrent CLL.  His absolute lymphocytosis showed gradual increase without associated symptoms, progressive anemia or thrombocytopenia.  Treatment was recommended with Acalabrutinib 100 mg BID, which he initiated on  4/25/22.  There was a lapse in his Promacta prescription around the same time.  Platelet count was 141,000.   It was not resumed.     Interval History  Mr. Cloud is here today in follow-up accompanied by his wife.  He initiated treatment with Acalabrutinib 100 mg BID on 4/25/22.  He has been off of Promacta since mid April.  He had progression of his ALC as expected after starting the Acalabrutinib, and that is improved today.  His platelet count shows interval decrease to 68,000.  He had increased bruising when he initially started the medication, but that has improved.  No bleeding.  He has no palpable adenopathy and is not experiencing sweats.  He is not experiencing any GI related side effects from his Acalabrutinib.  He underwent an echocardiogram recently after an x-ray revealed cardiomegaly.  Echocardiogram was normal and reviewed today in preparation for this visit.    Patient is here today in follow-up accompanied by his wife.  He feels well and has no complaints.  He has completed 3 months of treatment with Acalabrutinib at full dose with excellent tolerance.  Platelet count has continued to gradually decline.  Today it is 30,000. He reports minor bruising but no overt bleeding.  WBC shows interval increased to 33,000 (ALC 28.05).  Results were discussed today with the patient and his wife.  He is taking his medication as directed.    Review of patient's allergies indicates:   Allergen Reactions    Sulfa (sulfonamide antibiotics)      Other reaction(s): VOMITING    Sulfamethoxazole-trimethoprim Nausea Only and Nausea And Vomiting     Other reaction(s): Diaphoresis.      Review of Systems   Constitutional: Negative for appetite change and unexpected weight change.   HENT: Negative for mouth sores.    Eyes: Negative for visual disturbance.   Respiratory: Negative for cough and shortness of breath.    Cardiovascular: Negative for chest pain.   Gastrointestinal: Negative for abdominal pain and diarrhea.    Genitourinary: Negative for frequency.   Musculoskeletal: Negative for back pain.   Integumentary:  Negative for rash.   Neurological: Negative for headaches.   Hematological: Negative for adenopathy. Bruises/bleeds easily (improved).   Psychiatric/Behavioral: The patient is not nervous/anxious.          PMHx:   CLL, ITP, HTN    PSHx: Appendectomy, Laparoscopic Cholecystectomy, R arm skin graft, Bone marrow biopsy, Umbilical hernia repair, Colonoscopy 7/17    SH: Lifetime nonsmoker, + Social alcohol use, Lives in [place] with his wife    FH: Father had prostate cancer, mother had renal cell carcinoma     Objective:     Vitals:    07/28/22 0955   BP: 105/70   Pulse: 68   Resp: 18   Temp: 97 °F (36.1 °C)         Physical Exam  Constitutional:       Appearance: Normal appearance.   HENT:      Head: Normocephalic and atraumatic.      Nose: Nose normal.      Mouth/Throat:      Mouth: Mucous membranes are moist.      Pharynx: No oropharyngeal exudate.   Eyes:      General: No scleral icterus.     Conjunctiva/sclera: Conjunctivae normal.      Pupils: Pupils are equal, round, and reactive to light.   Cardiovascular:      Rate and Rhythm: Normal rate and regular rhythm.   Pulmonary:      Effort: Pulmonary effort is normal.      Breath sounds: Normal breath sounds.   Abdominal:      General: Abdomen is flat. Bowel sounds are normal.   Musculoskeletal:         General: Normal range of motion.      Cervical back: Normal range of motion and neck supple.   Skin:     General: Skin is warm and dry.      Findings: Bruising present.   Neurological:      General: No focal deficit present.      Mental Status: He is alert and oriented to person, place, and time.   Psychiatric:         Mood and Affect: Mood normal.         Behavior: Behavior normal.       ECOG SCORE    0 - Fully active-able to carry on all pre-disease performance without restriction            Recent Results (from the past 336 hour(s))   CBC with Differential     Collection Time: 07/28/22  9:47 AM   Result Value Ref Range    WBC 33.3 (H) 4.5 - 11.5 x10(3)/mcL    RBC 5.00 4.70 - 6.10 x10(6)/mcL    Hgb 15.3 14.0 - 18.0 gm/dL    Hct 44.0 42.0 - 52.0 %    MCV 88.0 80.0 - 94.0 fL    MCH 30.6 27.0 - 31.0 pg    MCHC 34.8 33.0 - 36.0 mg/dL    RDW 14.7 11.5 - 17.0 %    Platelet 30 (LL) 130 - 400 x10(3)/mcL    MPV 11.1 (H) 7.4 - 10.4 fL    Neut % 9.2 %    Lymph % 84.2 %    Mono % 5.5 %    Eos % 0.5 %    Basophil % 0.6 %    Lymph # 28.05 (H) 0.6 - 4.6 x10(3)/mcL    Neut # 3.1 2.1 - 9.2 x10(3)/mcL    Mono # 1.84 (H) 0.1 - 1.3 x10(3)/mcL    Eos # 0.17 0 - 0.9 x10(3)/mcL    Baso # 0.19 0 - 0.2 x10(3)/mcL    IG# 0.01 0 - 0.04 x10(3)/mcL    IG% 0.0 %        Assessment:   Relapsed CLL  Chronic ITP        Plan:   Case discussed with Dr. Velez.  Continue acalabrutinib 100 mg b.i.d..  Resume Promacta 50 mg daily.  Cannot be taken with Acalabrutinib or foods high in calcium  CBC monitoring every 2 weeks.  Return to clinic in 4 weeks for follow-up visit, or sooner if needed.  All questions answered to the satisfaction of the patient and his wife.    TRISTA SOLIS, FNP-C    Other physicians:  Dr. Spencer Guevara ()

## 2022-08-08 NOTE — PROGRESS NOTES
BRIEF HEM/ONC NOTE      Patient presented today for routine laboratory check.  CBC showed a white count of 30.6 which was stable and hemoglobin 13.7.  Platelet count had decreased to 1000 from a previous level of 30,000 on 7/28/22.  He was restarted on Promacta 50 mg daily at that time.  He has had some minor gum bleeding and has hemorrhagic bulla in the mouth and petechiae of the lower extremities.  He will be restarted on prednisone 40 mg daily and continue Promacta 50 mg daily.  He will not pursue any vigorous activity for the next several days until he has a follow-up platelet count.  Return to clinic on Wednesday 8/10/22 for a follow-up CBC.  He was instructed to report to the emergency room for any clinically significant bleeding.      José Velez MD

## 2022-08-15 NOTE — TELEPHONE ENCOUNTER
Patient here for lab and reviewed them with Dr. Velez. Dr. Velez increased his Promacta from 50 mg daily to 100 mg daily. New Rx faxed to pt assistance [facility] [phone].

## 2022-08-19 NOTE — FIRST PROVIDER EVALUATION
Medical screening exam completed.  I have conducted a focused provider triage encounter, findings are as follows:    Brief history of present illness:  Patient states that his platelets are low and he had a transfusion this morning. States that he fell last night.     There were no vitals filed for this visit.    Pertinent physical exam:  Awake, alert, ambulatory    Brief workup plan:  Labs, CT    Preliminary workup initiated; this workup will be continued and followed by the physician or advanced practice provider that is assigned to the patient when roomed.

## 2022-08-19 NOTE — ED PROVIDER NOTES
Encounter Date: 8/19/2022    SCRIBE #1 NOTE: I, Mitzi Jane, am scribing for, and in the presence of,  José Barth MD. I have scribed the following portions of the note - Other sections scribed: hpi, ros, pe .       History     Chief Complaint   Patient presents with    Headache    Fall     Fall last night, denies hitting head. Landed on right side, noted with bruising to right arm, c/o headache. Received transfusion earlier from MD Velez for low platelets.      66 y/o male with history of lymphoma, HTN, and ITP presenting to the ED after a fall that took place around 0000. Patient's wife reports patient fell on carpet, hit the wall, and loss consciousness for a few seconds. He reports falling on his right side and complains of right arm pain with bruising but denies wrist pain N/V. Patient is not sure if he hit his head after falling but complains of a headache. He received chemo infusions with Dr. José Velez earlier today due to low platelet count. Patient reports taking Calquence       PCP: Spencer Guevara II, MD    The history is provided by the patient and the spouse. No  was used.   Headache   This is a new problem. The current episode started today. Pertinent negatives include no abdominal pain, coughing, fever, nausea, numbness, sore throat, vomiting or weakness. His past medical history is significant for hypertension.   Fall  The accident occurred today. The fall occurred while standing. He landed on carpet. There was no blood loss. Point of impact: Right side. Pain location: Right arm. He was ambulatory at the scene. There was no entrapment after the fall. Associated symptoms include headaches and loss of consciousness. Pertinent negatives include no fever, no numbness, no abdominal pain, no nausea and no vomiting.     Review of patient's allergies indicates:   Allergen Reactions    Sulfa (sulfonamide antibiotics)      Other reaction(s): VOMITING     Sulfamethoxazole-trimethoprim Nausea Only and Nausea And Vomiting     Other reaction(s): Diaphoresis.     Past Medical History:   Diagnosis Date    Chronic ITP (idiopathic thrombocytopenia)     Hypertension     Leukemia      Past Surgical History:   Procedure Laterality Date    APPENDECTOMY      BONE MARROW BIOPSY      COLONOSCOPY      7/2017    LAPAROSCOPIC CHOLECYSTECTOMY      SKIN GRAFT N/A     R arm    UMBILICAL HERNIA REPAIR       Family History   Problem Relation Age of Onset    Kidney cancer Mother     Prostate cancer Father      Social History     Tobacco Use    Smoking status: Never Smoker    Smokeless tobacco: Never Used   Substance Use Topics    Alcohol use: Yes     Comment: social     Review of Systems   Constitutional: Negative for chills, fatigue and fever.   HENT: Negative for congestion and sore throat.    Eyes: Negative for visual disturbance.   Respiratory: Negative for cough and shortness of breath.    Cardiovascular: Negative for chest pain.   Gastrointestinal: Negative for abdominal pain, diarrhea, nausea and vomiting.   Genitourinary: Negative for dysuria.   Musculoskeletal: Negative for myalgias.        Right arm pain     Skin: Negative for rash.   Neurological: Positive for loss of consciousness and headaches. Negative for weakness and numbness.   All other systems reviewed and are negative.      Physical Exam     Initial Vitals [08/19/22 1038]   BP Pulse Resp Temp SpO2   125/72 77 20 99.3 °F (37.4 °C) 97 %      MAP       --         Physical Exam    Constitutional: He appears well-developed and well-nourished.   HENT:   Head: Normocephalic and atraumatic.   Mouth/Throat: Oropharynx is clear and moist.   Eyes: Pupils are equal, round, and reactive to light.   Neck: Neck supple.   Normal range of motion.  Cardiovascular: Normal rate, regular rhythm, normal heart sounds and intact distal pulses.   Pulmonary/Chest: Breath sounds normal. No respiratory distress.   Abdominal: Abdomen  is soft. Bowel sounds are normal. There is no abdominal tenderness. There is no rebound and no guarding.   Musculoskeletal:         General: No tenderness or edema. Normal range of motion.      Cervical back: Normal range of motion and neck supple.      Comments: No pain with ROM of elbow, wrist, and fingers.      Neurological: He is alert and oriented to person, place, and time. He has normal strength. No sensory deficit. GCS score is 15. GCS eye subscore is 4. GCS verbal subscore is 5. GCS motor subscore is 6.   Skin: Skin is warm and dry. Capillary refill takes less than 2 seconds. Bruising noted.   Old bruising to the right AC  Mild bruising with tenderness and swelling to extensor surface of the right forearm.    Psychiatric: He has a normal mood and affect.         ED Course   Critical Care    Date/Time: 8/19/2022 5:58 PM  Performed by: José Barth MD  Authorized by: José Barth MD   Total critical care time (exclusive of procedural time) : 50 minutes  Critical care was necessary to treat or prevent imminent or life-threatening deterioration of the following conditions: trauma.  Critical care was time spent personally by me on the following activities: discussions with consultants, obtaining history from patient or surrogate, ordering and review of laboratory studies, re-evaluation of patient's condition, ordering and review of radiographic studies, ordering and performing treatments and interventions, examination of patient and development of treatment plan with patient or surrogate.        Labs Reviewed   COMPREHENSIVE METABOLIC PANEL - Abnormal; Notable for the following components:       Result Value    Sodium Level 131 (*)     Carbon Dioxide 20 (*)     Glucose Level 145 (*)     Blood Urea Nitrogen 26.5 (*)     Globulin 3.6 (*)     Bilirubin Total 1.9 (*)     All other components within normal limits   CBC WITH DIFFERENTIAL - Abnormal; Notable for the following components:    WBC 42.4 (*)      RBC 2.62 (*)     Hgb 8.2 (*)     Hct 22.2 (*)     MCH 31.3 (*)     MCHC 36.9 (*)     Platelet 32 (*)     All other components within normal limits   MANUAL DIFFERENTIAL - Abnormal; Notable for the following components:    Abs Lymp 39.1 (*)     RBC Morph Abnormal (*)     Microcyte 1+ (*)     Platelet Est Decreased (*)     All other components within normal limits   PROTIME-INR - Normal   TROPONIN I - Normal   SARS-COV-2 RNA AMPLIFICATION, QUAL - Normal   BLOOD CULTURE [facility]   BLOOD CULTURE [facility]   CBC W/ AUTO DIFFERENTIAL    Narrative:     The following orders were created for panel order CBC auto differential.  Procedure                               Abnormality         Status                     ---------                               -----------         ------                     CBC with Differential[[id number]]        Abnormal            Final result               Manual Differential[[id number]]          Abnormal            Final result                 Please view results for these tests on the individual orders.   TYPE & SCREEN          Imaging Results          MRI Brain W WO Contrast (Final result)  Result time 08/19/22 14:47:10    Final result by Michelle Martinez MD (08/19/22 14:47:10)                 Impression:      1. A 13 mm dural-based enhancing mass along the left parietal convexity is most suggestive of a meningioma.  2. Trace subdural and subarachnoid hemorrhage along the right cerebral convexity.      Electronically signed by: Michelle Martinez  Date:    08/19/2022  Time:    14:47             Narrative:    EXAMINATION:  MRI BRAIN W WO CONTRAST    CLINICAL HISTORY:  Abnormal CT scan, Neurosurgery recommended this MRI to differentiate meningioma versus ICH;    TECHNIQUE:  Multiplanar, multisequence MR images of the brain were obtained with and without administration of intravenous contrast.    COMPARISON:  CT of the head dated 08/19/2022    FINDINGS:  There is a dural-based enhancing mass along the left  parietal convexity measuring 11 x 13 mm, with peripheral calcifications, dural tail and adjacent bony hypertrophy (series 10, image 11).  There is no adjacent edema.  There is trace subdural hemorrhage and subarachnoid hemorrhage along the right cerebral convexity.    There is no acute infarct.  Mild scattered T2/FLAIR hyperintensities in the subcortical and periventricular white matter likely represent chronic microvascular ischemic changes.    There is local mass effect without midline shift or herniation.  The basal cisterns are patent.  The ventricles are normal in size.  The major intracranial flow voids are patent.  There is a right maxillary sinus mucosal retention cyst.  The mastoid air cells are clear.                               CT Head Without Contrast (Final result)  Result time 08/19/22 11:54:04    Final result by Albert Nixon MD (08/19/22 11:54:04)                 Impression:      Left parietal lobe subjacent to calvarial bony protuberance small heterogeneous hyperdensity which may represent a partially calcified meningioma versus hemorrhagic brain lesion versus a small subarachnoid hemorrhage.  Follow-up exams are recommended.      Electronically signed by: Albert Nixon  Date:    08/19/2022  Time:    11:54             Narrative:    EXAMINATION:  CT HEAD WITHOUT CONTRAST    CLINICAL HISTORY:  Head trauma, moderate-severe;    TECHNIQUE:  Sequential axial images were performed of the brain without contrast.    Dose product length of 1101 mGycm. Automated exposure control was utilized to minimize radiation dose.    COMPARISON:  None available.    FINDINGS:  There is left parietal lobe peripheral heterogeneous hyperdensity on axial image 23 series 2 and the sagittal image 9 series 44 with maximum size of 1.4 cm.  This is subjacent to a bony calvarial protrusion.  This may represent partially calcified meningioma, hemorrhagic brain lesion or a small subarachnoid hemorrhage.  There is no mass effect,  midline shift or hydrocephalus.   There is no sulcal effacement or low attenuation changes to suggest recent large vessel territory infarction.    Right maxillary sinus small retention cyst.  Otherwise, visualized paranasal sinuses are clear without mucosal thickening, polypoidal abnormality or air-fluid levels. Mastoid air cells aeration is optimal.                                 Medications   ceFEPIme (MAXIPIME) 1 g in dextrose 5 % in water (D5W) 5 % 50 mL IVPB (MB+) (0 g Intravenous Stopped 8/19/22 1342)   eltrombopag tablet 100 mg (100 mg Oral Given 8/19/22 1700)   predniSONE tablet 40 mg (40 mg Oral Not Given 8/19/22 1445)   multivitamin tablet (1 tablet Oral Given 8/19/22 1457)   sodium chloride 0.9% flush 10 mL (has no administration in time range)   dextrose 10% bolus 125 mL (has no administration in time range)   dextrose 10% bolus 250 mL (has no administration in time range)   acetaminophen tablet 650 mg (has no administration in time range)   polyethylene glycol packet 17 g (has no administration in time range)   ondansetron injection 4 mg (has no administration in time range)   acetaminophen tablet 650 mg (has no administration in time range)   gadobenate dimeglumine (MULTIHANCE) injection 20 mL (20 mLs Intravenous Given 8/19/22 1429)     Medical Decision Making:   Differential Diagnosis:   Blunt head injury, formed contusion, thrombocytopenia, subarachnoid hemorrhage, subdural hematoma  Clinical Tests:   Lab Tests: Ordered and Reviewed  Radiological Study: Ordered and Reviewed  ED Management:  Patient neurovascularly intact, did not strike head but has a mild headache.  CT scan was obtained with the thrombocytopenia history, and the above findings on the chart.  Clinically more consistent with meningioma, and not blood.  Have discussed with oncologist, who defers to neurosurgery.  I have discussed with neurosurgeon on-call, who recommends an MRI of the brain with and without to differentiate whether or  not this is meningioma versus blood products.  If blood products will need ICU admission, if meningioma then the oncologist will admit to the floor.  All this was discussed with the patient and wife  Patient with meningioma and a trace subdural/subarachnoid hemorrhage.  Have rediscussed with Neurosurgery along with Oncology.  They will follow closely.  Will discuss with Trauma Service as the patient had a fall at midnight, although no evidence of head trauma.    Surgical hospitalist team has seen the patient, will provide consult, states patient is appropriate for medical ICU admission.          Scribe Attestation:   Scribe #1: I performed the above scribed service and the documentation accurately describes the services I performed. I attest to the accuracy of the note.    Attending Attestation:           Physician Attestation for Scribe:  Physician Attestation Statement for Scribe #1: I, reviewed documentation, as scribed by Mitzi Jane in my presence, and it is both accurate and complete.             ED Course as of 08/19/22 4679   Fri Aug 19, 2022   1220 Neuro surgery and oncologist paged  [MS]   1221 Dr José Velez MD paged  [MS]   1551 Neuro surgery and ICU paged  [MS]   1756 ICU paged  [MS]      ED Course User Index  [MS] Mitzi Jane             Clinical Impression:   Final diagnoses:  [R55] Syncope  [D69.3] Chronic ITP (idiopathic thrombocytopenia)  [S06.5X9A] Subdural hematoma (Primary)  [D69.6] Thrombocytopenia  [S50.11XA] Contusion of right forearm, initial encounter          ED Disposition Condition    Admit               José Barth MD  08/19/22 3428

## 2022-08-19 NOTE — H&P
HISTORY AND PHYSICAL  Hematology/Oncology       History of Present Illness:  Patient is a 67 y.o. 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    Treatment History  Fludarabine/Rituxan x 5 cycles (1/15)  WinRho 5/18  Rituxan x4 (6/18)  Promacta 8/18-present    He was initially treated with 5 cycles of FR CLL with a CR.  Cycle 6 was held due to cumulative cytopenias despite dose reduction.  His counts recovered gradually.  He developed acute ITP 5/18 without evidence of recurrence of his CLL at that time.  He was refractory to treatment with prednisone, WinRho and Rituxan.  He responded to treatment with Promacta and was continued on treatment.  He developed recurrence of his CLL and was started on Acalabrutinib 4/25/22. Platelet count at that time was 140,000 and Promacta was held.  Treatment was well tolerated.      07/28/22: Plts 30,000. Resumed Promacta 50 mg/d.  08/08/22: Plts 1,000. Prednisone 40 mg/d.  08/10/22: Plts 1,000. IVIG 1 gm/kg x 2 days.  08/15/22: Plts 4,000. D/C Acalabrutinib and increased Promacta to 100 mg/d.  08/18/22: Plts 2,000. Transfuse 1 unit platelets on 8/19/22.    Patient fell at home on the evening of 8/18/22 landing on his side.  Consciousness, he denies hitting his head.  He did have a headache earlier today.  Has a hematoma involving the right forearm.  CBC shows progressive leukocytosis and anemia.  Hospital admission was recommended for additional workup and treatment.  He is alert and oriented.  He has no focal neurologic deficits.  He denies any fever or chills.  CT of the head without contrast showed a left parietal lobe heterogeneous hyperdensity - could not rule out partially calcified meningioma or small arachnoid hemorrhage.  Case was discussed with Neurosurgery and MRI of the brain ordered.  [place]atelet count is 15662 post transfusion.      SUBJECTIVE:     Continuous Infusions:  Scheduled Meds:   ceFEPime (MAXIPIME) IVPB  1 g Intravenous Q8H     PRN Meds:    Review of  patient's allergies indicates:   Allergen Reactions    Sulfa (sulfonamide antibiotics)      Other reaction(s): VOMITING    Sulfamethoxazole-trimethoprim Nausea Only and Nausea And Vomiting     Other reaction(s): Diaphoresis.        Review of Systems:  Constitutional:  Weight stable, No fever, No chills, + generalized weakness, + fatigue.    Eye:  No icterus, No blurring, No double vision, No visual disturbances.    Ear/Nose/Mouth/Throat:  No decreased hearing, No nasal congestion, No sore throat.  No gum bleeding or epistaxis.  Respiratory:  No shortness of breath, No cough, No sputum production, No hemoptysis, No wheezing.    Cardiovascular:  No chest pain, No palpitations, No tachycardia.    Gastrointestinal:  No nausea, No vomiting, No diarrhea, No constipation, No abdominal pain.    Genitourinary:  No dysuria, No hematuria, No change in urine stream.    Hematology/Lymphatics:  + bruising tendency, No bleeding tendency, No swollen lymph glands.    Musculoskeletal:  No joint pain or back pain. No LE edema.  Integumentary:  No rash, No pruritus, + petechiae.    Neurologic:  Alert and oriented X4, No abnormal balance, + mild headache.      PMHx:   CLL, ITP, HTN  PSHx: Appendectomy, Laparoscopic Cholecystectomy, R arm skin graft, Bone marrow biopsy, Umbilical hernia repair, Colonoscopy 7/17  SH: Lifetime nonsmoker, + Social alcohol use, Lives in [place] with his wife  FH: Father had prostate cancer, mother had renal cell carcinoma       OBJECTIVE:     Vital Signs (Most Recent)  Temp: 99.3 °F (37.4 °C) (08/19/22 1038)  Pulse: 87 (08/19/22 1224)  Resp: 14 (08/19/22 1224)  BP: 108/76 (08/19/22 1224)  SpO2: 100 % (08/19/22 1224)    Physical Exam:  General: well developed, ill-appearing white male in NAD.  HEENT: normocephalic, atraumatic, no bruises or hematomas on the head or face.  conjunctivae/corneas clear.  + hemorrhagic bulla involving buccal mucosa.  Lungs:  clear to auscultation bilaterally, no crackles or  wheezes.  Heart: regular rate and rhythm, no murmur.  Abdomen: soft, non-tender non-distented; bowel sounds normal; no masses or splenomegaly  Extremities: no cyanosis or edema, or clubbing  Skin: Warm, intact. No rashes or lesions.  Scattered ecchymoses and petechiae on the extremities.  +hematoma involving right forearm.  Neurologic: Alert and oriented, no focal deficits, normal strength.  Gait normal.      Laboratory:  CBC with Differential:  Recent Labs   Lab 08/19/22  1058   WBC 42.4*   RBC 2.62*   HCT 22.2*   HGB 8.2*   MCV 84.7   MCH 31.3*   RDW 13.5   PLT 32*   MPV 10.1     CMP:  Recent Labs   Lab 08/19/22  1058   CALCIUM 9.0   ALBUMIN 3.8   *   K 4.7   CO2 20*   BUN 26.5*   CREATININE 0.75   ALKPHOS 49   ALT 27   AST 19   BILITOT 1.9*     BMP:   Recent Labs   Lab 08/19/22  1058   CALCIUM 9.0   *   K 4.7   CO2 20*   BUN 26.5*   CREATININE 0.75     LFTs:   Recent Labs   Lab 08/19/22  1058   ALT 27   AST 19   ALKPHOS 49   BILITOT 1.9*   ALBUMIN 3.8       Diagnostic Results:  CT head images reviewed.      ASSESSMENT/PLAN:   ASSESSMENT  Refractory ITP  Recurrent CLL with progressive leukocytosis  Progressive anemia  Fall at home     PLAN  Platelet count 32,000 following platelet transfusion today.  However, responses to platelet transfusion over the past 2 weeks have been transient.  As before, no evidence of response to steroids or IVIG.  Promacta dose increased to 100 mg daily since 08/15/22.  Repeat CBC in the a.m..  CT of the head equivocal for small subarachnoid hemorrhage versus a meningioma.  MRI of the brain ordered per Neurosurgery recommendations.  Blood cultures obtained and will start empiric antibiotics with IV cefepime for possible occult infection.  Prognosis is guarded.          DUKE LYNCH MD

## 2022-08-19 NOTE — Clinical Note
Diagnosis: Chronic ITP (idiopathic thrombocytopenia) [908814]   Admitting Provider:: DUKE LYNCH [[id number]]   Future Attending Provider: DUKE LYNCH [[id number]]   Reason for IP Medical Treatment  (Clinical interventions that can only be accomplished in the IP setting? ) :: Bleeding risk   Estimated Length of Stay:: 2 midnights   I certify that Inpatient services for greater than or equal to 2 midnights are medically necessary:: Yes   Plans for Post-Acute care--if anticipated (pick the single best option):: A. No post acute care anticipated at this time

## 2022-08-19 NOTE — PLAN OF CARE
Trauma Surgery Service    Pt is a 67yoM with PMHx CLL on therapy, ITP who presented to the ED after a syncopal episode around 0000 last night. Complaining of R sided pain and headache. MRI showing meningioma with SDH/SAH. Neurosurgery recommends admission to ICU. No head-related traumatic injuries noted on exam. Platelets on admission 2,000.    At this time recommend admission to the medical ICU team/hematology oncology for workup and stabilization. Suspect SDH/SAH likely not 2/2 traumatic process and more likely related to meningioma vs ITP.     Leanne Vasquez MD  [facility]

## 2022-08-20 NOTE — H&P
[facility] - Emergency Dept  Pulmonary Critical Care Note    Patient Name: Ronny Cloud  MRN: [id number]  Admission Date: 8/19/2022  Hospital Length of Stay: 0 days  Code Status: Full Code  Attending Provider: José Barth MD  Primary Care Provider: KELLIE STODDARD MD     Subjective:     HPI:   Mr. Ronny Cloud is a 67-year-old  male with PMH of CLL ( dx 2013) and Chronic ITP ( dx 2018) who [facility] with Dr. Velez due to history of fall last night in the setting of profoundly worsening thrombocytopenia (2K) . Patient is accompanied by wife, Fina. He is completely alert oriented without any FND.  Experienced syncopal episode last night around 12:30 a.m.  He got up to use the restroom, on the way out hit the side of the wall with subsequent LOC x 10-20secs and fall to the floor.  Doesn't specifically remember any preceding events or head trauma but only that his BP has been running low this week and mild frontal HA x 3 days without any associated N/V/photophobia,bowel/bladder dysfunction, visions or gait abnormalities.     He is currently undergoing chemotherapy with PO CALQUENCE for CLL and Promacta ( 8/18) for thrombocytopenia ( which has been worsening x 1 month, but acutely dropped from 30 -->1 over the past week).     In the ED CTH revealed findings concerning for small left parietal lobe SAH vs hemorrhagic brain lesion vs calcified meningioma. NSGY Dr. Contreras consulted by ED who recommended MRI W/WO which confirmed trace subdural and SAH along right cerebral conversty along with 13mm dural-based enhancing mass suggestive of a meningioma. Admitted to ICU for close neurological monitoring given increased risk of bleeding due to hx thrombocytopenia. Of note Plt count has increased from 2 --> 32 after 1 unt plt transfusion at Dr. Maldonado office today.       Hospital Course/Significant events:N/A      24 Hour Interval History: N/A      Past Medical History:    Diagnosis Date    Chronic ITP (idiopathic thrombocytopenia)     Hypertension     Leukemia        Past Surgical History:   Procedure Laterality Date    APPENDECTOMY      BONE MARROW BIOPSY      COLONOSCOPY      7/2017    LAPAROSCOPIC CHOLECYSTECTOMY      SKIN GRAFT N/A     R arm    UMBILICAL HERNIA REPAIR          Social History     Socioeconomic History    Marital status:    Tobacco Use    Smoking status: Never Smoker    Smokeless tobacco: Never Used   Substance and Sexual Activity    Alcohol use: Yes     Comment: social           Objective:     Current Outpatient Medications   Medication Instructions    CALQUENCE 100 mg Cap 1 capsule, Oral, 2 times daily    eltrombopag (PROMACTA) 100 mg, Oral, Daily    losartan (COZAAR) 100 mg, Oral, Daily    multivitamin with minerals tablet 1 tablet, Oral, Daily    predniSONE (DELTASONE) 40 mg, Oral, Daily       Current Inpatient Medications   ceFEPime (MAXIPIME) IVPB  1 g Intravenous Q8H    eltrombopag  100 mg Oral Daily    multivitamin  1 tablet Oral Daily    predniSONE  40 mg Oral Daily     Infusions    PRN  acetaminophen, acetaminophen, dextrose 10%, dextrose 10%, labetalol, ondansetron, polyethylene glycol, prochlorperazine, sodium chloride 0.9%, sodium chloride 0.9%    No intake or output data in the 24 hours ending 08/19/22 1923    Review of Systems   Constitutional: Positive for diaphoresis. Negative for chills, fever, malaise/fatigue and weight loss.   HENT: Negative for hearing loss, sore throat and tinnitus.    Eyes: Negative for blurred vision, double vision and photophobia.   Respiratory: Negative for cough, hemoptysis and shortness of breath.    Cardiovascular: Negative for chest pain and palpitations.   Gastrointestinal: Negative for nausea and vomiting.   Genitourinary: Negative for frequency and urgency.   Musculoskeletal: Positive for falls. Negative for myalgias and neck pain.   Skin: Positive for rash. Negative for itching.    Neurological: Positive for loss of consciousness and headaches. Negative for dizziness, tingling, tremors, sensory change, speech change, focal weakness, seizures and weakness.   Endo/Heme/Allergies: Bruises/bleeds easily.   Psychiatric/Behavioral: The patient is not nervous/anxious and does not have insomnia.       Unable to obtain    Vital Signs (Most Recent):  Temp: 99.3 °F (37.4 °C) (08/19/22 1038)  Pulse: 72 (08/19/22 1752)  Resp: (!) 22 (08/19/22 1300)  BP: 129/68 (08/19/22 1752)  SpO2: 98 % (08/19/22 1752)  Body mass index is 35.89 kg/m².  Weight: 120.2 kg (265 lb) Vital Signs (24h Range):  Temp:  [98.9 °F (37.2 °C)-99.3 °F (37.4 °C)] 99.3 °F (37.4 °C)  Pulse:  [62-87] 72  Resp:  [14-22] 22  SpO2:  [97 %-100 %] 98 %  BP: (108-142)/(68-80) 129/68     Physical Exam  Constitutional:       General: He is not in acute distress.     Appearance: Normal appearance. He is obese. He is not ill-appearing, toxic-appearing or diaphoretic.   HENT:      Head: Normocephalic and atraumatic.      Nose: No rhinorrhea.      Mouth/Throat:      Mouth: Mucous membranes are moist.      Comments: Right buccal mucosa and tongue ulcer 2/2 thrombocytopenia   Eyes:      Extraocular Movements: Extraocular movements intact.      Conjunctiva/sclera: Conjunctivae normal.      Pupils: Pupils are equal, round, and reactive to light.   Neck:      Vascular: No carotid bruit.   Cardiovascular:      Rate and Rhythm: Normal rate and regular rhythm.      Pulses: Normal pulses.      Heart sounds: Normal heart sounds. No murmur heard.  Pulmonary:      Effort: No respiratory distress.      Breath sounds: No wheezing.   Abdominal:      General: Bowel sounds are normal. There is no distension.      Palpations: Abdomen is soft.      Tenderness: There is no abdominal tenderness. There is no guarding or rebound.   Musculoskeletal:         General: Swelling (right arm hematoma and bilateral LE petechial rash) present. No tenderness or deformity.       Cervical back: No rigidity.   Lymphadenopathy:      Cervical: No cervical adenopathy.   Skin:     Capillary Refill: Capillary refill takes less than 2 seconds.   Neurological:      General: No focal deficit present.      Mental Status: He is alert and oriented to person, place, and time. Mental status is at baseline.      Cranial Nerves: No cranial nerve deficit.      Sensory: No sensory deficit.      Motor: No weakness.      Coordination: Coordination normal.      Comments: CN 2-12 intact, no sensorimotor deficits, PEERL, no dysdiadochokinesia, normal heel-shin, normal finger-to-nose test, no gait abnormalities, normal coordination   Psychiatric:         Mood and Affect: Mood normal.         Thought Content: Thought content normal.         Judgment: Judgment normal.         Mechanical ventilation support: on room air       Lines/Drains/Airways     Peripheral Intravenous Line  Duration                Peripheral IV - Single Lumen 08/19/22 0812 22 G Posterior;Right Hand <1 day                Significant Labs:    Lab Results   Component Value Date    WBC 42.4 (H) 08/19/2022    HGB 8.2 (L) 08/19/2022    HCT 22.2 (L) 08/19/2022    MCV 84.7 08/19/2022    PLT 32 (LL) 08/19/2022         BMP  Lab Results   Component Value Date     (L) 08/19/2022    K 4.7 08/19/2022    CO2 20 (L) 08/19/2022    BUN 26.5 (H) 08/19/2022    CREATININE 0.75 08/19/2022    CALCIUM 9.0 08/19/2022    EGFRNONAA >60 07/28/2022       ABG  No results for input(s): PH, PO2, PCO2, HCO3, BE in the last 168 hours.        Significant Imaging:  I have reviewed all pertinent imaging within the past 24 hours.    MRI Brain W WO Contrast   Final Result      1. A 13 mm dural-based enhancing mass along the left parietal convexity is most suggestive of a meningioma.   2. Trace subdural and subarachnoid hemorrhage along the right cerebral convexity.         Electronically signed by: Michelle Martinez   Date:    08/19/2022   Time:    14:47      CT Head Without  Contrast   Final Result      Left parietal lobe subjacent to calvarial bony protuberance small heterogeneous hyperdensity which may represent a partially calcified meningioma versus hemorrhagic brain lesion versus a small subarachnoid hemorrhage.  Follow-up exams are recommended.         Electronically signed by: Albert Nixon   Date:    08/19/2022   Time:    11:54          Microbiology Results (last 7 days)     Procedure Component Value Units Date/Time    Blood culture #2 **CANNOT BE ORDERED STAT** [[id number]] Collected: 08/19/22 1310    Order Status: Resulted Specimen: Blood from Arm, Left Updated: 08/19/22 1326    Blood culture #1 **CANNOT BE ORDERED STAT** [[id number]] Collected: 08/19/22 1306    Order Status: Resulted Specimen: Blood from Arm, Left Updated: 08/19/22 1326          Assessment/Plan:     Assessment    1. Fall at home in the setting of profound thrombocytopenia w/ resultant:  a. Trace SAH & subdural hemorrhage along right cerebral convexity in the setting of profound thrombocytopenia , with local mass effect without midline shift or herniation  b. Hx of ITP, on Promacta per Dr. Velez  2. Left Parietal 13mm convexity, suggestive of meningioma   3. Pancytopenia in setting of CLL on oral chemotherapy with Calquence and prednisone followed by Dr. Velez   4. Hx HTN    Plan  -continue close hemodynamic and neurological monitoring in ICU w/ q1h neurochecks   -NSGY ( Dr. Giordano)  and Oncology ( Dr. Velez) on board appreciate recs  -defer repeat imaging in AM to neuro unless change in neurological status is noted, would recommend stat CTH  -Continue Prednisone and Promacta, ordered by oncology  -hold AC due to increased risk of hemorrhage evolution  -Seizure and aspiration precautions  -holding of on starting AEDs given patient is very stable at this time  -can continue cefepime (avoid Vancomycin, Zosyn, Merrem due to increased risk of worsening thrombocytopenia), but he is afebrile and concern for infection is  low although being of chemotherapy agent and prednisone does put him at a higher risk for developing   -blood cultures pending  -continue SSI and GI ppx while on steroid therapy      Consults: Oncology, NSGY  DVT Prophylaxis:SCDs  GI Prophylaxis: pantoprazole PO      Greater than 30 minutes of critical care was time spent personally by me on the following activities: development of treatment plan with patient or surrogate and bedside caregivers, discussions with consultants, evaluation of patient's response to treatment, examination of patient, ordering and performing treatments and interventions, ordering and review of laboratory studies, ordering and review of radiographic studies, pulse oximetry, re-evaluation of patient's condition.  This critical care time did not overlap with that of any other provider or involve time for any procedures.     Smiley Viera MD  Internal Medicine Resident PGY-3

## 2022-08-20 NOTE — PROGRESS NOTES
HISTORY AND PHYSICAL  Hematology/Oncology       History of Present Illness:  Patient is a 67 y.o. 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    Treatment History  Fludarabine/Rituxan x 5 cycles (1/15)  WinRho 5/18  Rituxan x4 (6/18)  Promacta 8/18-present    He was initially treated with 5 cycles of FR CLL with a CR.  Cycle 6 was held due to cumulative cytopenias despite dose reduction.  His counts recovered gradually.  He developed acute ITP 5/18 without evidence of recurrence of his CLL at that time.  He was refractory to treatment with prednisone, WinRho and Rituxan.  He responded to treatment with Promacta and was continued on treatment.  He developed recurrence of his CLL and was started on Acalabrutinib 4/25/22. Platelet count at that time was 140,000 and Promacta was held.  Treatment was well tolerated.      07/28/22: Plts 30,000. Resumed Promacta 50 mg/d.  08/08/22: Plts 1,000. Prednisone 40 mg/d.  08/10/22: Plts 1,000. IVIG 1 gm/kg x 2 days.  08/15/22: Plts 4,000. D/C Acalabrutinib and increased Promacta to 100 mg/d.  08/18/22: Plts 2,000. Transfuse 1 unit platelets on 8/19/22.    Patient fell at home on the evening of 8/18/22 landing on his side.  He did not lose Consciousness, he denies hitting his head.  He did have a headache.  Has a hematoma involving the right forearm.  CBC shows progressive leukocytosis and anemia.  Hospital admission was recommended for additional workup and treatment.  He is alert and oriented.  He has no focal neurologic deficits.  He denies any fever or chills.  CT of the head without contrast showed a left parietal lobe heterogeneous hyperdensity - could not rule out partially calcified meningioma or small arachnoid hemorrhage.  Case was discussed with Neurosurgery and MRI of the brain ordered.  This was done on 08/19/2022 and revealed a 13 mm dural-based enhancing mass along the left parietal convexity most suggestive of meningioma with a trace subdural and  subarachnoid hemorrhage along the right cerebral convexity.      Today, 08/20/2022:   Patient seen and examined.  He is getting platelets and packed red blood cells.  He is overall doing well.  He has no major complaints or issues to discuss.      SUBJECTIVE:     Continuous Infusions:  Scheduled Meds:   eltrombopag  100 mg Oral Daily    multivitamin  1 tablet Oral Daily    pantoprazole  40 mg Oral Daily    predniSONE  40 mg Oral Daily     PRN Meds:    Review of patient's allergies indicates:   Allergen Reactions    Sulfa (sulfonamide antibiotics)      Other reaction(s): VOMITING    Sulfamethoxazole-trimethoprim Nausea Only and Nausea And Vomiting     Other reaction(s): Diaphoresis.        Review of Systems:  Constitutional:  Weight stable, No fever, No chills, + generalized weakness, + fatigue.    Eye:  No icterus, No blurring, No double vision, No visual disturbances.    Ear/Nose/Mouth/Throat:  No decreased hearing, No nasal congestion, No sore throat.  No gum bleeding or epistaxis.  Respiratory:  No shortness of breath, No cough, No sputum production, No hemoptysis, No wheezing.    Cardiovascular:  No chest pain, No palpitations, No tachycardia.    Gastrointestinal:  No nausea, No vomiting, No diarrhea, No constipation, No abdominal pain.    Genitourinary:  No dysuria, No hematuria, No change in urine stream.    Hematology/Lymphatics:  + bruising tendency, No bleeding tendency, No swollen lymph glands.    Musculoskeletal:  No joint pain or back pain. No LE edema.  Integumentary:  No rash, No pruritus, + petechiae.    Neurologic:  Alert and oriented X4, No abnormal balance, + mild headache.      PMHx:   CLL, ITP, HTN  PSHx: Appendectomy, Laparoscopic Cholecystectomy, R arm skin graft, Bone marrow biopsy, Umbilical hernia repair, Colonoscopy 7/17  SH: Lifetime nonsmoker, + Social alcohol use, Lives in [place] with his wife  FH: Father had prostate cancer, mother had renal cell carcinoma       OBJECTIVE:      Vital Signs (Most Recent)  Temp: 98.5 °F (36.9 °C) (08/20/22 1057)  Pulse: 81 (08/20/22 1115)  Resp: 15 (08/20/22 1100)  BP: 133/70 (08/20/22 1115)  SpO2: 95 % (08/20/22 1115)    Physical Exam:  General: well developed, ill-appearing white male in NAD.  HEENT: normocephalic, atraumatic, no bruises or hematomas on the head or face.  conjunctivae/corneas clear.  + hemorrhagic bulla involving buccal mucosa.  Lungs:  clear to auscultation bilaterally, no crackles or wheezes.  Heart: regular rate and rhythm, no murmur.  Abdomen: soft, non-tender non-distented; bowel sounds normal; no masses or splenomegaly  Extremities: no cyanosis or edema, or clubbing  Skin: Warm, intact. No rashes or lesions.  Scattered ecchymoses and petechiae on the extremities.  +hematoma involving right forearm.  Neurologic: Alert and oriented, no focal deficits, normal strength.  Gait normal.      Laboratory:  CBC with Differential:  Recent Labs   Lab 08/20/22  0247   WBC 21.0*   RBC 2.18*   HCT 19.5*   HGB 6.7*   MCV 89.4   MCH 30.7   RDW 14.1   PLT 9*   MPV 9.8     CMP:  Recent Labs   Lab 08/20/22  0245   CALCIUM 8.5*   ALBUMIN 3.4   *   K 4.1   CO2 24   BUN 23.2   CREATININE 0.76   ALKPHOS 44   ALT 22   AST 12   BILITOT 2.0*     BMP:   Recent Labs   Lab 08/20/22  0245   CALCIUM 8.5*   *   K 4.1   CO2 24   BUN 23.2   CREATININE 0.76     LFTs:   Recent Labs   Lab 08/20/22  0245   ALT 22   AST 12   ALKPHOS 44   BILITOT 2.0*   ALBUMIN 3.4       Diagnostic Results:  CT head images reviewed.      ASSESSMENT/PLAN:   ASSESSMENT  Refractory ITP  Recurrent CLL with progressive leukocytosis  Progressive anemia  Fall at home     PLAN  Platelet count 9000 today.  We will transfuse platelets.  Hemoglobin 6.7, will transfuse packed red blood cells.  Continue current Promacta dose.  Continue to follow ICU recommendations.  Repeat CBC in the a.m..  Continue IV antibiotics.          Jarad Manzano II, MD

## 2022-08-21 NOTE — PROGRESS NOTES
HISTORY AND PHYSICAL  Hematology/Oncology       History of Present Illness:  Patient is a 67 y.o. 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    Treatment History  Fludarabine/Rituxan x 5 cycles (1/15)  WinRho 5/18  Rituxan x4 (6/18)  Promacta 8/18-present    He was initially treated with 5 cycles of FR CLL with a CR.  Cycle 6 was held due to cumulative cytopenias despite dose reduction.  His counts recovered gradually.  He developed acute ITP 5/18 without evidence of recurrence of his CLL at that time.  He was refractory to treatment with prednisone, WinRho and Rituxan.  He responded to treatment with Promacta and was continued on treatment.  He developed recurrence of his CLL and was started on Acalabrutinib 4/25/22. Platelet count at that time was 140,000 and Promacta was held.  Treatment was well tolerated.      07/28/22: Plts 30,000. Resumed Promacta 50 mg/d.  08/08/22: Plts 1,000. Prednisone 40 mg/d.  08/10/22: Plts 1,000. IVIG 1 gm/kg x 2 days.  08/15/22: Plts 4,000. D/C Acalabrutinib and increased Promacta to 100 mg/d.  08/18/22: Plts 2,000. Transfuse 1 unit platelets on 8/19/22.    Patient fell at home on the evening of 8/18/22 landing on his side.  He did not lose Consciousness, he denies hitting his head.  He did have a headache.  Has a hematoma involving the right forearm.  CBC shows progressive leukocytosis and anemia.  Hospital admission was recommended for additional workup and treatment.  He is alert and oriented.  He has no focal neurologic deficits.  He denies any fever or chills.  CT of the head without contrast showed a left parietal lobe heterogeneous hyperdensity - could not rule out partially calcified meningioma or small arachnoid hemorrhage.  Case was discussed with Neurosurgery and MRI of the brain ordered.  This was done on 08/19/2022 and revealed a 13 mm dural-based enhancing mass along the left parietal convexity most suggestive of meningioma with a trace subdural and  subarachnoid hemorrhage along the right cerebral convexity.      Today, 08/21/2022:   Patient seen and examined.  He is receiving more platelets today.  Overall he is doing well.  He has no neurologic complaints.      SUBJECTIVE:     Continuous Infusions:  Scheduled Meds:   eltrombopag  100 mg Oral Daily    multivitamin  1 tablet Oral Daily    pantoprazole  40 mg Oral Daily    predniSONE  40 mg Oral Daily     PRN Meds:    Review of patient's allergies indicates:   Allergen Reactions    Sulfa (sulfonamide antibiotics)      Other reaction(s): VOMITING    Sulfamethoxazole-trimethoprim Nausea Only and Nausea And Vomiting     Other reaction(s): Diaphoresis.        Review of Systems:  Constitutional:  Weight stable, No fever, No chills, + generalized weakness, + fatigue.    Eye:  No icterus, No blurring, No double vision, No visual disturbances.    Ear/Nose/Mouth/Throat:  No decreased hearing, No nasal congestion, No sore throat.  No gum bleeding or epistaxis.  Respiratory:  No shortness of breath, No cough, No sputum production, No hemoptysis, No wheezing.    Cardiovascular:  No chest pain, No palpitations, No tachycardia.    Gastrointestinal:  No nausea, No vomiting, No diarrhea, No constipation, No abdominal pain.    Genitourinary:  No dysuria, No hematuria, No change in urine stream.    Hematology/Lymphatics:  + bruising tendency, No bleeding tendency, No swollen lymph glands.    Musculoskeletal:  No joint pain or back pain. No LE edema.  Integumentary:  No rash, No pruritus, + petechiae.    Neurologic:  Alert and oriented X4, No abnormal balance, + mild headache.      PMHx:   CLL, ITP, HTN  PSHx: Appendectomy, Laparoscopic Cholecystectomy, R arm skin graft, Bone marrow biopsy, Umbilical hernia repair, Colonoscopy 7/17  SH: Lifetime nonsmoker, + Social alcohol use, Lives in [place] with his wife  FH: Father had prostate cancer, mother had renal cell carcinoma       OBJECTIVE:     Vital Signs (Most  Recent)  Temp: 97.2 °F (36.2 °C) (08/21/22 1054)  Pulse: 71 (08/21/22 1054)  Resp: (!) 21 (08/21/22 1054)  BP: (!) 143/74 (08/21/22 1054)  SpO2: 100 % (08/21/22 1054)    Physical Exam:  General: well developed, ill-appearing white male in NAD.  HEENT: normocephalic, atraumatic, no bruises or hematomas on the head or face.  conjunctivae/corneas clear.  + hemorrhagic bulla involving buccal mucosa.  Lungs:  clear to auscultation bilaterally, no crackles or wheezes.  Heart: regular rate and rhythm, no murmur.  Abdomen: soft, non-tender non-distented; bowel sounds normal; no masses or splenomegaly  Extremities: no cyanosis or edema, or clubbing  Skin: Warm, intact. No rashes or lesions.  Scattered ecchymoses and petechiae on the extremities.  +hematoma involving right forearm.  Neurologic: Alert and oriented, no focal deficits, normal strength.  Gait normal.      Laboratory:  CBC with Differential:  Recent Labs   Lab 08/21/22  0233   WBC 14.5*   RBC 2.63*   HCT 22.3*   HGB 7.8*   MCV 84.8   MCH 29.7   RDW 13.9   PLT 15*   MPV 9.9     CMP:  No results for input(s): GLU, CALCIUM, ALBUMIN, PROT, NA, K, CO2, CL, BUN, CREATININE, ALKPHOS, ALT, AST, BILITOT in the last 24 hours.  BMP:   No results for input(s): GLU, CALCIUM, NA, K, CO2, CL, BUN, CREATININE in the last 24 hours.  LFTs:   No results for input(s): ALT, AST, ALKPHOS, BILITOT, PROT, ALBUMIN in the last 24 hours.    Diagnostic Results:  CT head images reviewed.      ASSESSMENT/PLAN:   ASSESSMENT  Refractory ITP  Recurrent CLL with progressive leukocytosis  Progressive anemia  Fall at home     PLAN  Platelet count up to 15,000 today, will transfuse more platelets to hopefully get over 20,000 due to the fact that he had this small subarachnoid hemorrhage.  Hemoglobin > 7, no packed red blood cells today.  Continue current Promacta dose.  Continue to follow ICU recommendations.  Likely downgrade to the floor.  Repeat CBC in the a.m..          Jarad Manzano II, MD

## 2022-08-21 NOTE — CONSULTS
[facility] - 7th Floor ICU  Neurosurgery  History & Physical    Patient Name: Ronny Cloud  MRN: [id number]  Admission Date: 8/19/2022  Attending Physician: Farheen Giordano MD,MSc  Primary Care Provider: KELLIE STODDARD MD    Patient information was obtained from Pt     Subjective:     Chief Complaint/Reason for Admission:     History of Present Illness: 68 y/o male with history of lymphoma, HTN, and ITP presenting to the ED after a fall that took place around 0000. Patient's wife reports patient fell on carpet, hit the wall, and loss consciousness for a few seconds. He reports falling on his right side and complains of right arm pain with bruising but denies wrist pain N/V. Patient is not sure if he hit his head after falling but complains of a headache. He received chemo infusions with Dr. José Velez earlier today due to low platelet count. Patient reports taking Calquence        PTA Medications   Medication Sig    CALQUENCE 100 mg Cap Take 1 capsule by mouth 2 (two) times a day.    eltrombopag (PROMACTA) 50 MG Tab Take 2 tablets (100 mg total) by mouth once daily.    losartan (COZAAR) 100 MG tablet Take 1 tablet (100 mg total) by mouth once daily.    multivitamin with minerals tablet Take 1 tablet by mouth once daily.    predniSONE (DELTASONE) 20 MG tablet Take 2 tablets (40 mg total) by mouth once daily.       Review of patient's allergies indicates:   Allergen Reactions    Sulfa (sulfonamide antibiotics)      Other reaction(s): VOMITING    Sulfamethoxazole-trimethoprim Nausea Only and Nausea And Vomiting     Other reaction(s): Diaphoresis.       Past Medical History:   Diagnosis Date    Chronic ITP (idiopathic thrombocytopenia)     Hypertension     Leukemia      Past Surgical History:   Procedure Laterality Date    APPENDECTOMY      BONE MARROW BIOPSY      COLONOSCOPY      7/2017    LAPAROSCOPIC CHOLECYSTECTOMY      SKIN GRAFT N/A     R arm    UMBILICAL HERNIA REPAIR        Family History     Problem Relation (Age of Onset)    Kidney cancer Mother    Prostate cancer Father        Tobacco Use    Smoking status: Never Smoker    Smokeless tobacco: Never Used   Substance and Sexual Activity    Alcohol use: Yes     Comment: social    Drug use: Not on file    Sexual activity: Not on file     Review of Systems  Objective:     Weight: 119.4 kg (263 lb 4.8 oz)  Body mass index is 35.66 kg/m².  Vital Signs (Most Recent):  Temp: 98.7 °F (37.1 °C) (08/21/22 0545)  Pulse: (!) 51 (08/21/22 0600)  Resp: 14 (08/21/22 0600)  BP: 138/78 (08/21/22 0600)  SpO2: 95 % (08/21/22 0600) Vital Signs (24h Range):  Temp:  [98.1 °F (36.7 °C)-98.7 °F (37.1 °C)] 98.7 °F (37.1 °C)  Pulse:  [51-89] 51  Resp:  [5-24] 14  SpO2:  [94 %-100 %] 95 %  BP: ()/(47-83) 138/78                          Neurosurgery Physical Exam  General: no acute distress  Head: Non-traumatic, normocephalic  Eyes: Pupils equal, EOMI  Neck: Supple, normal ROM, no tenderness to palpation  CVS: Normal rate and rhythm, distal pulses present  Pulm: Symmetric expansion, no respiratory distress  GI: Abdomen nondistended, nontender    MSK: Moves all extremities without restriction, atraumatic  Skin: Dry, intact  Psych: Normal thought content and cognition    Neuro:  Alert, awake, oriented, to self, place, time  Language:  Speech is fluent, goal directed without any noted dysarthria or aphasia    Cranial nerves:    CNII-XII: Intact on fine exam,   Pupils equal round react to light,   Extraocular muscles are intact  V1 to V3 is intact to light touch,   no facial asymmetry,   Hearing is intact to finger rub and voice  tongue/uvula/palate midline,   shoulder shrug equal,     No pronator drift    Extremities:  Motor:  Upper Extremity    Deltoid Triceps Biceps Wrist  Extension Wrist  Flexion Interosseous   R 5/5 5/5 5/5 5/5 5/5 5/5   L 5/5 5/5 5/5 5/5 5/5 5/5       Thumb   Abduction Thumb  ADDuction Finger  Flexion Finger  Extension     R 5/5  5/5 5/5 5/5     L 5/5 5/5 5/5 5/5        Lower Extremity     Iliopsoas Quadriceps Hamstring Plantarflexion Dorsiflexion EHL   R 5/5 5/5 5/5 5/5 5/5 5/5   L 5/5 5/5 5/5 5/5 5/5 5/5       Reflexes:     DTR: 2+ biceps    2+ triceps   2+ brachioradialis   2+ patellar  2+ Achilles     Dexter's: Negative     Babinski's: Negative     Clonus: Negative     Sensory:      Sensation intact to light touch, temperature sensation, vibration, pinprick     Coordination:      Coordination intact throughout, no dysmetria, normal rapid alternating movements, no dysdiadochokinesia  Cerebellar:  Normal finger-to-nose, normal heel-to-shin    Significant Labs:  Recent Labs   Lab 08/19/22  1058 08/20/22  0245   * 133*   K 4.7 4.1   CO2 20* 24   BUN 26.5* 23.2   CREATININE 0.75 0.76   CALCIUM 9.0 8.5*     Recent Labs   Lab 08/19/22  1058 08/20/22  0247 08/21/22  0233   WBC 42.4* 21.0* 14.5*   HGB 8.2* 6.7* 7.8*   HCT 22.2* 19.5* 22.3*   PLT 32* 9* 15*     Recent Labs   Lab 08/19/22  1059   INR 1.00     Microbiology Results (last 7 days)     Procedure Component Value Units Date/Time    Blood culture #1 **CANNOT BE ORDERED STAT** [[id number]]  (Normal) Collected: 08/19/22 1306    Order Status: Completed Specimen: Blood from Arm, Left Updated: 08/20/22 1503     CULTURE, BLOOD ([facility]) No Growth At 24 Hours    Blood culture #2 **CANNOT BE ORDERED STAT** [[id number]]  (Normal) Collected: 08/19/22 1310    Order Status: Completed Specimen: Blood from Arm, Left Updated: 08/20/22 1503     CULTURE, BLOOD ([facility]) No Growth At 24 Hours        All pertinent labs from the last 24 hours have been reviewed.    Significant Diagnostics:  I have reviewed and interpreted all pertinent imaging results/findings within the past 24 hours.     CT with small area of hyperdensity in L parietal lobe  MRI with enhancing dural based lesion , highly suggestive of a meningioma     Assessment/Plan:     Active Diagnoses:    Diagnosis Date Noted POA    PRINCIPAL PROBLEM:   Chronic ITP (idiopathic thrombocytopenia) [D69.3] 06/16/2022 Yes    CLL (chronic lymphoid leukemia) in relapse [C91.12] 06/15/2022 Yes      Problems Resolved During this Admission:     - CLL  - repeat head CT   - Follow up as an outpatient for dural based lesion   - Thrombocytopenia per hematology   - Will follow at request of primary team       Farheen Giordano MD  Neurosurgery  [facility] - 7th Floor ICU

## 2022-08-21 NOTE — PROGRESS NOTES
[facility] - 7th Floor ICU  Critical Care - Medicine  Progress Note    Patient Name: Ronny Cloud  MRN: [id number]  Admission Date: 8/19/2022  Hospital Length of Stay: 2 days  Code Status: Full Code  Attending Provider: José Tyler MD  Primary Care Provider: KELLIE STODDARD MD   Principal Problem: Chronic ITP (idiopathic thrombocytopenia)    Subjective:     HPI: 67 M admitted for fall, imaging showing ICH with L parietal mass and potential hemmorhagic component.     Hospital/ICU Course: No neuro deficits. Received plts and RBCs yesterday.    Interval History/Significant Events: Hemodynamically stable. No neuro deficits.     Objective:     Vital Signs (Most Recent):  Temp: 97.1 °F (36.2 °C) (08/21/22 0854)  Pulse: 68 (08/21/22 1000)  Resp: (!) 21 (08/21/22 1000)  BP: 138/79 (08/21/22 1000)  SpO2: 97 % (08/21/22 1000) Vital Signs (24h Range):  Temp:  [97 °F (36.1 °C)-98.7 °F (37.1 °C)] 97.1 °F (36.2 °C)  Pulse:  [51-89] 68  Resp:  [5-24] 21  SpO2:  [94 %-100 %] 97 %  BP: (101-153)/(56-83) 138/79     Weight: 119.4 kg (263 lb 4.8 oz)  Body mass index is 35.66 kg/m².      Intake/Output Summary (Last 24 hours) at 8/21/2022 1024  Last data filed at 8/21/2022 0720  Gross per 24 hour   Intake 1205 ml   Output 3301 ml   Net -2096 ml       Physical Exam  GENERAL: NAD, A & 0 X 3, calm  CARDIAC: RRR, NO m/g/r  PULM: CTA BL, No wheezing or rales  ABD: NT/ND/S. Bowel sounds heard  EXT: no cyanosis, clubbing, or edema, peripheral pulses intact  NEURO: no obvious neurosensory deficits, no dysarthria noted  PSYCH: no agitation or anxiety   EYES: tracks examiner around room, pupil reactive to light  NECK: trachea midline, no obvious JVD      Vents:none       Lines/Drains/Airways     Peripheral Intravenous Line  Duration                Peripheral IV - Single Lumen 08/19/22 0812 22 G Posterior;Right Hand 2 days         Peripheral IV - Single Lumen 08/20/22 0512 20 G Anterior;Right Forearm 1 day                 Significant Labs:    CBC/Anemia Profile:  Recent Labs   Lab 08/19/22  1058 08/20/22  0247 08/21/22  0233   WBC 42.4* 21.0* 14.5*   HGB 8.2* 6.7* 7.8*   HCT 22.2* 19.5* 22.3*   PLT 32* 9* 15*   MCV 84.7 89.4 84.8   RDW 13.5 14.1 13.9        Chemistries:  Recent Labs   Lab 08/19/22  1058 08/20/22  0245   * 133*   K 4.7 4.1   CO2 20* 24   BUN 26.5* 23.2   CREATININE 0.75 0.76   CALCIUM 9.0 8.5*   ALBUMIN 3.8 3.4   BILITOT 1.9* 2.0*   ALKPHOS 49 44   ALT 27 22   AST 19 12   GLUCOSE 145* 99           Significant Imaging:  I have reviewed all pertinent imaging results/findings within the past 24 hours.    Medication: Current meds reviewed    Assessment/Plan:     A/P  1)fall with possible ICH  2)L parietal mass  3)h/o CLL and ITP with pancytopenia    -can downgrade to floor given stability in ICU  -transfuse per oncology recommendations  -plan in regards to mass per NSG recs       Edy Joya MD  Critical Care - Medicine  [facility] - 7th Floor ICU

## 2022-08-22 NOTE — PLAN OF CARE
Problem: Adult Inpatient Plan of Care  Goal: Plan of Care Review  Outcome: Ongoing, Progressing  Goal: Patient-Specific Goal (Individualized)  Outcome: Ongoing, Progressing  Goal: Optimal Comfort and Wellbeing  Outcome: Ongoing, Progressing  Goal: Readiness for Transition of Care  Outcome: Ongoing, Progressing     Problem: Adjustment to Illness (Stroke, Hemorrhagic)  Goal: Optimal Coping  Outcome: Ongoing, Progressing     Problem: Bowel Elimination Impaired (Stroke, Hemorrhagic)  Goal: Effective Bowel Elimination  Outcome: Ongoing, Progressing     Problem: Cerebral Tissue Perfusion (Stroke, Hemorrhagic)  Goal: Optimal Cerebral Tissue Perfusion  Outcome: Ongoing, Progressing     Problem: Cognitive Impairment (Stroke, Hemorrhagic)  Goal: Optimal Cognitive Function  Outcome: Ongoing, Progressing     Problem: Communication Impairment (Stroke, Hemorrhagic)  Goal: Effective Communication Skills  Outcome: Ongoing, Progressing     Problem: Functional Ability Impaired (Stroke, Hemorrhagic)  Goal: Optimal Functional Ability  Outcome: Ongoing, Progressing     Problem: Pain (Stroke, Hemorrhagic)  Goal: Acceptable Pain Control  Outcome: Ongoing, Progressing     Problem: Sensorimotor Impairment (Stroke, Hemorrhagic)  Goal: Improved Sensorimotor Function  Outcome: Ongoing, Progressing     Problem: Swallowing Impairment (Stroke, Hemorrhagic)  Goal: Optimal Eating and Swallowing Without Aspiration  Outcome: Ongoing, Progressing     Problem: Urinary Elimination Impaired (Stroke, Hemorrhagic)  Goal: Effective Urinary Elimination  Outcome: Ongoing, Progressing     Problem: Fall Injury Risk  Goal: Absence of Fall and Fall-Related Injury  Outcome: Ongoing, Progressing

## 2022-08-22 NOTE — TELEPHONE ENCOUNTER
----- Message from Susan Pulido, AGACNP-BC sent at 8/22/2022  1:34 PM CDT -----  Regarding: hospital f/u  Patient needs f/u in 2 weeks with CT head without contrast prior to appt. And in 6 months with MRI brain with and without contrast prior to appt. I have ordered both scans. -Susan

## 2022-08-22 NOTE — PROGRESS NOTES
[facility] - 7th Floor ICU  Neurosurgery  Progress Note    Subjective:     Interval History:  No new issues.  Vital signs stable.  Patient resting in bed.    History of Present Illness:  This is a 66 y/o male with history of lymphoma, HTN, and ITP presenting to the ED after a fall that took place around 0000. Patient's wife reports patient fell on carpet, hit the wall, and loss consciousness for a few seconds. He reports falling on his right side and complains of right arm pain with bruising but denies wrist pain N/V. Patient is not sure if he hit his head after falling but complains of a headache. He received chemo infusions with Dr. José Velez earlier today due to low platelet count. Patient reports taking Calquence.    Post-Op Info:  * No surgery found *          Medications:  Continuous Infusions:  Scheduled Meds:   ceFEPime (MAXIPIME) IVPB  2 g Intravenous Q8H    eltrombopag  100 mg Oral Daily    multivitamin  1 tablet Oral Daily    pantoprazole  40 mg Oral Daily    predniSONE  40 mg Oral Daily     PRN Meds:sodium chloride, sodium chloride, sodium chloride, sodium chloride, acetaminophen, acetaminophen, dextrose 10%, dextrose 10%, diphenhydrAMINE, labetalol, ondansetron, polyethylene glycol, prochlorperazine, sodium chloride 0.9%, sodium chloride 0.9%     Review of Systems   All other systems reviewed and are negative.    Objective:     Weight: 119.4 kg (263 lb 4.8 oz)  Body mass index is 35.66 kg/m².  Vital Signs (Most Recent):  Temp: (!) 102.6 °F (39.2 °C) (08/22/22 1001)  Pulse: 95 (08/22/22 1115)  Resp: 20 (08/22/22 1115)  BP: 128/68 (08/22/22 1115)  SpO2: (!) 93 % (08/22/22 1115) Vital Signs (24h Range):  Temp:  [97.1 °F (36.2 °C)-103 °F (39.4 °C)] 102.6 °F (39.2 °C)  Pulse:  [60-97] 95  Resp:  [3-37] 20  SpO2:  [83 %-99 %] 93 %  BP: ()/() 128/68     Date 08/22/22 0700 - 08/23/22 0659   Shift 8386-2355 4332-2776 7663-7242 24 Hour Total   INTAKE   Shift Total(mL/kg)        OUTPUT   Urine(mL/kg/hr) 1150   1150   Shift Total(mL/kg) 1150(9.6)   1150(9.6)   Weight (kg) 119.4 119.4 119.4 119.4                        Physical Exam:  Nursing note and vitals reviewed.    Constitutional: He appears well-developed and well-nourished. No distress.     Eyes: Pupils are equal, round, and reactive to light. Conjunctivae and EOM are normal.     Cardiovascular: Normal rate, regular rhythm and intact distal pulses.     Abdominal: Soft. Bowel sounds are normal.     Psych/Behavior: He is oriented to person, place, and time. He has a normal mood and affect.     Musculoskeletal:        Right Upper Extremities: Muscle strength is 5/5.        Left Upper Extremities: Muscle strength is 5/5.       Right Lower Extremities: Muscle strength is 5/5.        Left Lower Extremities: Muscle strength is 5/5.     Neurological:        Sensory: There is no sensory deficit in the trunk. There is no sensory deficit in the extremities.        DTRs: DTRs are DTRS NORMAL AND SYMMETRICnormal and symmetric.        Cranial nerves: Cranial nerve(s) II, III, IV, V, VI, VII, VIII, IX, X, XI and XII are intact.       Significant Labs:  No results for input(s): GLU, NA, K, CL, CO2, BUN, CREATININE, CALCIUM, MG in the last 48 hours.  Recent Labs   Lab 08/21/22  0233 08/22/22  0223   WBC 14.5* 11.2   HGB 7.8* 8.1*   HCT 22.3* 23.2*   PLT 15* 19*     No results for input(s): LABPT, INR, APTT in the last 48 hours.  Microbiology Results (last 7 days)     Procedure Component Value Units Date/Time    Blood Culture [[id number]] Collected: 08/22/22 0642    Order Status: Resulted Specimen: Blood, Venous Updated: 08/22/22 0658    Blood culture #2 **CANNOT BE ORDERED STAT** [[id number]]  (Normal) Collected: 08/19/22 1310    Order Status: Completed Specimen: Blood from Arm, Left Updated: 08/21/22 1501     CULTURE, BLOOD ([facility]) No Growth At 48 Hours    Blood culture #1 **CANNOT BE ORDERED STAT** [[id number]]  (Normal) Collected: 08/19/22 1306     Order Status: Completed Specimen: Blood from Arm, Left Updated: 08/21/22 1500     CULTURE, BLOOD ([facility]) No Growth At 48 Hours          Significant Diagnostics:      Assessment/Plan:    Dr. Giordano recommended follow-up in 6 weeks with Dr. Luong  Follow hematology plan.    Will sign off at this time.     Active Diagnoses:    Diagnosis Date Noted POA    PRINCIPAL PROBLEM:  Chronic ITP (idiopathic thrombocytopenia) [D69.3] 06/16/2022 Yes    CLL (chronic lymphoid leukemia) in relapse [C91.12] 06/15/2022 Yes      Problems Resolved During this Admission:       [facility]  Neurosurgery  [facility] - 7th Floor ICU

## 2022-08-22 NOTE — PLAN OF CARE
08/22/22 1113   Discharge Assessment   Assessment Type Discharge Planning Assessment   Confirmed/corrected address, phone number and insurance Yes   Confirmed Demographics Correct on Facesheet   Source of Information patient   Reason For Admission chronic thrombocytopenia   Lives With spouse  (Pt lives w his wife, Katina in a single story home with no steps to enter.)   Do you expect to return to your current living situation? Yes   Do you have help at home or someone to help you manage your care at home? Yes   Who are your caregiver(s) and their phone number(s)? pts wife, Katina--[phone]   Prior to hospitilization cognitive status: Alert/Oriented   Current cognitive status: Alert/Oriented   Walking or Climbing Stairs Difficulty none;other (see comments)  (Pt reports he has assess to a RW)   Dressing/Bathing Difficulty none   Home Layout Able to live on 1st floor   Equipment Currently Used at Home none   Patient currently being followed by outpatient case management? No   Do you currently have service(s) that help you manage your care at home? No   Who is going to help you get home at discharge? pt's wife, Katina   How do you get to doctors appointments? car, drives self   Are you on dialysis? No   Discharge Plan A   (Home)   Discharge Plan B   (Home)   DME Needed Upon Discharge  other (see comments)  (TBD)   Discharge Plan discussed with: Patient   Discharge Barriers Identified None   Relationship/Environment   Name(s) of Who Lives With Patient Pt's wife, Katina       Pt's PCP is Dr Paola WATTS. He uses [facility] on [address] of [address]. Follow for any dc needs.

## 2022-08-22 NOTE — PROGRESS NOTES
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    Treatment History  Fludarabine/Rituxan x 5 cycles (1/15)  WinRho 5/18  Rituxan x4 (6/18)  Promacta 8/18-present    He was initially treated with 5 cycles of FR CLL with a CR.  Cycle 6 was held due to cumulative cytopenias despite dose reduction.  His counts recovered gradually.  He developed acute ITP 5/18 without evidence of recurrence of his CLL at that time.  He was refractory to treatment with prednisone, WinRho and Rituxan.  He responded to treatment with Promacta and was continued on treatment.  He developed recurrence of his CLL and was started on Acalabrutinib 4/25/22. Platelet count at that time was 140,000 and Promacta was held.  Treatment was well tolerated.      07/28/22: Plts 30,000. Resumed Promacta 50 mg/d.  08/08/22: Plts 1,000. Prednisone 40 mg/d.  08/10/22: Plts 1,000. IVIG 1 gm/kg x 2 days.  08/15/22: Plts 4,000. D/C Acalabrutinib and increased Promacta to 100 mg/d.  08/18/22: Plts 2,000. Transfuse 1 unit platelets on 8/19/22.    Patient fell at home on the evening of 8/18/22 landing on his side.  He did not lose Consciousness, he denies hitting his head.  He did have a headache.  Has a hematoma involving the right forearm.  CBC shows progressive leukocytosis and anemia.  Hospital admission was recommended for additional workup and treatment.  He is alert and oriented.  He has no focal neurologic deficits.  He denies any fever or chills.  CT of the head without contrast showed a left parietal lobe heterogeneous hyperdensity - could not rule out partially calcified meningioma or small arachnoid hemorrhage.  Case was discussed with Neurosurgery. MRI brain 08/19/2022 revealed a 13 mm dural-based enhancing mass along the left parietal convexity most suggestive of meningioma with a trace subdural and subarachnoid hemorrhage along the right cerebral convexity.       He was observed in the ICU with no progressive neurologic symptoms. He spiked a fever on 8/21/22 and blood cultures were obtained.  He was continued on IV Cefepime.  He received PRBC and platelet transfusions for symptomatic anemia and ongoing thrombocytopenia.    Today (8/22/22): Patient awake and alert. Minor headache yesterday, none currently.  T-max a 103° F at 5:30 a.m. this morning.  Minor chills.  No chest pain, cough or shortness of breath.  No abdominal pain, nausea or diarrhea.  No frequency, urgency or dysuria.  No mucocutaneous bleeding.  Oral hemorrhagic bulla resolved.    SUBJECTIVE:     Continuous Infusions:  Scheduled Meds:   ceFEPime (MAXIPIME) IVPB  2 g Intravenous Q8H    eltrombopag  100 mg Oral Daily    multivitamin  1 tablet Oral Daily    pantoprazole  40 mg Oral Daily    predniSONE  40 mg Oral Daily     PRN Meds:    Review of patient's allergies indicates:   Allergen Reactions    Sulfa (sulfonamide antibiotics)      Other reaction(s): VOMITING    Sulfamethoxazole-trimethoprim Nausea Only and Nausea And Vomiting     Other reaction(s): Diaphoresis.        Review of Systems:  Constitutional:  Weight stable, + fever, minor chills, + generalized weakness, + fatigue.    Eye:  No icterus, No blurring, No double vision, No visual disturbances.    Ear/Nose/Mouth/Throat:  No decreased hearing, No nasal congestion, No sore throat.  No gum bleeding or epistaxis.  Respiratory:  No shortness of breath, No cough, No sputum production, No hemoptysis, No wheezing.    Cardiovascular:  No chest pain, No palpitations, No tachycardia.    Gastrointestinal:  No nausea, No vomiting, No diarrhea, mild constipation, No abdominal pain.    Genitourinary:  No dysuria, No hematuria, No change in urine stream.    Hematology/Lymphatics:  + bruising tendency, No bleeding tendency, No swollen lymph glands.    Musculoskeletal:  No joint pain or back pain. No LE edema.  Integumentary:  No rash, No pruritus, +  petechiae.    Neurologic:  Alert and oriented X4, No abnormal balance, + mild headache - improved.    PMHx:   CLL, ITP, HTN  PSHx: Appendectomy, Laparoscopic Cholecystectomy, R arm skin graft, Bone marrow biopsy, Umbilical hernia repair, Colonoscopy 7/17  SH: Lifetime nonsmoker, + Social alcohol use, Lives in [place] with his wife  FH: Father had prostate cancer, mother had renal cell carcinoma       OBJECTIVE:     Vital Signs (Most Recent)  Temp: (!) 101.9 °F (38.8 °C) (08/22/22 0700)  Pulse: 86 (08/22/22 0700)  Resp: 19 (08/22/22 0700)  BP: (!) 147/67 (08/22/22 0700)  SpO2: (!) 84 % (08/22/22 0700)    Physical Exam:  General: well developed, ill-appearing white male in NAD.  HEENT: normocephalic, atraumatic, conjunctivae/corneas clear.  OP clear, no lesions or petechiae.  Lungs:  clear to auscultation throughout.  Heart: regular rate and rhythm, no murmur.  Abdomen: soft, non-tender non-distented; bowel sounds normal; no masses or splenomegaly  Extremities: no LE edema  Skin: Warm, intact. No rashes or lesions.  Scattered ecchymoses and petechiae on the extremities.  +hematoma involving right forearm.  Neurologic: Alert and oriented, no focal deficits, normal strength.  Gait normal.      Laboratory:  CBC with Differential:  Recent Labs   Lab 08/22/22  0223   WBC 11.2   RBC 2.70*   HCT 23.2*   HGB 8.1*   MCV 85.9   MCH 30.0   RDW 13.9   PLT 19*   MPV 10.1     CMP:  No results for input(s): GLU, CALCIUM, ALBUMIN, PROT, NA, K, CO2, CL, BUN, CREATININE, ALKPHOS, ALT, AST, BILITOT in the last 24 hours.  BMP:   No results for input(s): GLU, CALCIUM, NA, K, CO2, CL, BUN, CREATININE in the last 24 hours.  LFTs:   No results for input(s): ALT, AST, ALKPHOS, BILITOT, PROT, ALBUMIN in the last 24 hours.    Diagnostic Results:  CXR pending      ASSESSMENT/PLAN:   ASSESSMENT  Refractory ITP  Recurrent CLL - leukocytosis improving  Anemia  Fever    PLAN  Patient recultured secondary to fever. Continue IV  cefepime.  Leukocytosis improving since acalabrutinib held.  Platelet count stable today post transfusion.  Continue Promacta 100 mg daily.  Repeat labs in [email or web address].      DUKE LYNCH MD

## 2022-08-23 NOTE — CONSULTS
CONSULTATION DATE: 8/23/2022          CONSULTING PHYSICIAN: Dr. José Velez     REASON FOR CONSULTATION: Fever, left lower lobe pneumonia in an immunocompromised patient          HISTORY OF PRESENT ILLNESS:  He is a 67-year-old male with a past medical history of CLL and refractory ITP, on chronic prednisone 40 mg daily as well as Promacta (previously on Rituxan - last in 6/2022), who was brought to the emergency room after falling at home.  There was concern for ICH, and patient was evaluated by Neurosurgery.  He has improved without intervention, and neurosurgery is planning to follow him as an outpatient.  Approximately 2 days following admit, patient did begin to have notable fevers.  Blood cultures were obtained and are negative thus far.  CT of the chest showed evidence of a left lower lobe pneumonia.  He was empirically started on cefepime and Azactam I sent.  Sputum culture and respiratory PCR panel is pending.  Patient reports he was feeling somewhat ill several days prior to falling and had subjective fevers and weakness.  Additionally, he did report a somewhat productive cough, however denies any recent travel or known sick contacts.  He is currently sitting on the side of the bed without significant complaints..  He does still require supplemental oxygen, however denies any shortness of breath or chest pain.  We have been consulted for input regarding fever and pneumonia in an immunocompromised patient.          PAST MEDICAL HISTORY:   Past Medical History:   Diagnosis Date    Chronic ITP (idiopathic thrombocytopenia)     Hypertension     Leukemia             PAST SURGICAL HISTORY:   Past Surgical History:   Procedure Laterality Date    APPENDECTOMY      BONE MARROW BIOPSY      COLONOSCOPY      7/2017    LAPAROSCOPIC CHOLECYSTECTOMY      SKIN GRAFT N/A     R arm    UMBILICAL HERNIA REPAIR              SOCIAL HISTORY:   Social History     Socioeconomic History    Marital status:     Tobacco Use    Smoking status: Never Smoker    Smokeless tobacco: Never Used   Substance and Sexual Activity    Alcohol use: Yes     Comment: social            FAMILY HISTORY:   Family History   Problem Relation Age of Onset    Kidney cancer Mother     Prostate cancer Father             ALLERGIES:   Review of patient's allergies indicates:   Allergen Reactions    Sulfa (sulfonamide antibiotics)      Other reaction(s): VOMITING    Sulfamethoxazole-trimethoprim Nausea Only and Nausea And Vomiting     Other reaction(s): Diaphoresis.            REVIEW OF SYSTEMS: Negative unless stated above         MEDICATIONS:   Reviewed in EMR        PHYSICAL EXAM:   Vitals:    08/23/22 1230   BP: 116/62   Pulse: 80   Resp: (!) 27   Temp:       GENERAL: NAD; does not appear toxic  SKIN: no rash  HEENT: sclera non-icteric; PERRL; OP without erythema or exudates  NECK: supple; no LAD  CHEST: Mostly CTA with rales noted to posterior LLL; nonlabored, equal expansion   CARDIOVASCULAR: RRR, S1S2; no murmur; strong, equal peripheral pulses; no edema  ABDOMEN:  active bowel sounds; abdomen soft, nondistended, nontender to palpation  GENITOURINARY: no suprapubic tenderness; no CVA tenderness   EXTREMITIES: no cyanosis or clubbing  NEURO: AAO x4; CN II-XII grossly intact  PSYCH: Mentation and affect appropriate          LABORATORY DATA: Reviewed         RADIOLOGICAL DATA:   Imaging Results          MRI Brain W WO Contrast (Final result)  Result time 08/19/22 14:47:10    Final result by Michelle Martinez MD (08/19/22 14:47:10)                 Impression:      1. A 13 mm dural-based enhancing mass along the left parietal convexity is most suggestive of a meningioma.  2. Trace subdural and subarachnoid hemorrhage along the right cerebral convexity.      Electronically signed by: Michelle Martinez  Date:    08/19/2022  Time:    14:47             Narrative:    EXAMINATION:  MRI BRAIN W WO CONTRAST    CLINICAL HISTORY:  Abnormal CT scan,  Neurosurgery recommended this MRI to differentiate meningioma versus ICH;    TECHNIQUE:  Multiplanar, multisequence MR images of the brain were obtained with and without administration of intravenous contrast.    COMPARISON:  CT of the head dated 08/19/2022    FINDINGS:  There is a dural-based enhancing mass along the left parietal convexity measuring 11 x 13 mm, with peripheral calcifications, dural tail and adjacent bony hypertrophy (series 10, image 11).  There is no adjacent edema.  There is trace subdural hemorrhage and subarachnoid hemorrhage along the right cerebral convexity.    There is no acute infarct.  Mild scattered T2/FLAIR hyperintensities in the subcortical and periventricular white matter likely represent chronic microvascular ischemic changes.    There is local mass effect without midline shift or herniation.  The basal cisterns are patent.  The ventricles are normal in size.  The major intracranial flow voids are patent.  There is a right maxillary sinus mucosal retention cyst.  The mastoid air cells are clear.                               CT Head Without Contrast (Final result)  Result time 08/19/22 11:54:04    Final result by Albert Nixon MD (08/19/22 11:54:04)                 Impression:      Left parietal lobe subjacent to calvarial bony protuberance small heterogeneous hyperdensity which may represent a partially calcified meningioma versus hemorrhagic brain lesion versus a small subarachnoid hemorrhage.  Follow-up exams are recommended.      Electronically signed by: Albert Nixon  Date:    08/19/2022  Time:    11:54             Narrative:    EXAMINATION:  CT HEAD WITHOUT CONTRAST    CLINICAL HISTORY:  Head trauma, moderate-severe;    TECHNIQUE:  Sequential axial images were performed of the brain without contrast.    Dose product length of 1101 mGycm. Automated exposure control was utilized to minimize radiation dose.    COMPARISON:  None available.    FINDINGS:  There is left parietal  lobe peripheral heterogeneous hyperdensity on axial image 23 series 2 and the sagittal image 9 series 44 with maximum size of 1.4 cm.  This is subjacent to a bony calvarial protrusion.  This may represent partially calcified meningioma, hemorrhagic brain lesion or a small subarachnoid hemorrhage.  There is no mass effect, midline shift or hydrocephalus.   There is no sulcal effacement or low attenuation changes to suggest recent large vessel territory infarction.    Right maxillary sinus small retention cyst.  Otherwise, visualized paranasal sinuses are clear without mucosal thickening, polypoidal abnormality or air-fluid levels. Mastoid air cells aeration is optimal.                                      IMPRESSION:   He is a 67-year-old male with a past medical history of CLL and refractory ITP who presented after falling at home.  He was found to possibly have a small intracranial hemorrhage which resolved without intervention.  He then began with fevers, and imaging revealed a left lower lobe pneumonia.  Id consulted for input given patient's immunocompromised status.    1. Left lower lobe pneumonia  2. Sepsis s/t above  3. Possible ICH s/t fall at home, stable without intervention  4. H/o CLL and refractory ITP on chronic prednisone and Promacta       PLAN:  Send MRSA PCR and urine Legionella Ag.  F/u sputum cx and respiratory PCR panel.  Repeat COVID PCR negative.  BCx negative thus far.  Continue cefepime and azithromycin for now.   Encouraged mobilization and use of IS as tolerated.  Noted prednisone dose decreased to 20mg daily per heme / onc.  Discussed with patient, wife, and nursing.

## 2022-08-23 NOTE — PROGRESS NOTES
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    Treatment History  Fludarabine/Rituxan x 5 cycles (1/15)  WinRho 5/18  Rituxan x4 (6/18)  Promacta 8/18-present    He was initially treated with 5 cycles of FR CLL with a CR.  Cycle 6 was held due to cumulative cytopenias despite dose reduction.  His counts recovered gradually.  He developed acute ITP 5/18 without evidence of recurrence of his CLL at that time.  He was refractory to treatment with prednisone, WinRho and Rituxan.  He responded to treatment with Promacta and was continued on treatment.  He developed recurrence of his CLL and was started on Acalabrutinib 4/25/22. Platelet count at that time was 140,000 and Promacta was held.  Treatment was well tolerated.      07/28/22: Plts 30,000. Resumed Promacta 50 mg/d.  08/08/22: Plts 1,000. Prednisone 40 mg/d.  08/10/22: Plts 1,000. IVIG 1 gm/kg x 2 days.  08/15/22: Plts 4,000. D/C Acalabrutinib and increased Promacta to 100 mg/d.  08/18/22: Plts 2,000. Transfuse 1 unit platelets on 8/19/22.    Patient fell at home on the evening of 8/18/22 landing on his side.  He did not lose Consciousness, he denies hitting his head.  He did have a headache.  Has a hematoma involving the right forearm.  CBC shows progressive leukocytosis and anemia.  Hospital admission was recommended for additional workup and treatment.  He is alert and oriented.  He has no focal neurologic deficits.  He denies any fever or chills.  CT of the head without contrast showed a left parietal lobe heterogeneous hyperdensity - could not rule out partially calcified meningioma or small arachnoid hemorrhage.  Case was discussed with Neurosurgery. MRI brain 08/19/2022 revealed a 13 mm dural-based enhancing mass along the left parietal convexity most suggestive of meningioma with a trace subdural and subarachnoid hemorrhage along the right cerebral convexity.       He was observed in the ICU with no progressive neurologic symptoms. He spiked a fever on 8/21/22 and blood cultures were obtained.  He was continued on IV Cefepime.  He received PRBC and platelet transfusions for symptomatic anemia and ongoing thrombocytopenia.  He continued to have fever with negative blood cultures.  Chest x-ray 08/22/22 showed a possible LLL infiltrate.  CT of the chest showed a dense LLL consolidation and additional patchy ground-glass opacities.  Respiratory PCR panel was ordered and azithromycin was added to his antibiotics.    Today (8/23/22):  He continues to run fever.  He denies any chest pain or shortness of breath.  Minimal cough without significant sputum production.  Oxygen saturation 93% on room air.  He had 1 minor episode of epistaxis, no gum bleeding.  Platelet count 9000 today.  He is currently receiving a platelet transfusion.      SUBJECTIVE:     Continuous Infusions:  Scheduled Meds:   azithromycin (ZITHROMAX) 250 mg IVPB  250 mg Intravenous Q24H    ceFEPime (MAXIPIME) IVPB  2 g Intravenous Q8H    eltrombopag  100 mg Oral Daily    multivitamin  1 tablet Oral Daily    pantoprazole  40 mg Oral Daily    predniSONE  40 mg Oral Daily     PRN Meds:    Review of patient's allergies indicates:   Allergen Reactions    Sulfa (sulfonamide antibiotics)      Other reaction(s): VOMITING    Sulfamethoxazole-trimethoprim Nausea Only and Nausea And Vomiting     Other reaction(s): Diaphoresis.        Review of Systems:  Constitutional:  Weight stable, + fever, no chills, + generalized weakness, + fatigue.    Eye:  No icterus, No blurring, No double vision, No visual disturbances.    Ear/Nose/Mouth/Throat:  No decreased hearing, No nasal congestion, No sore throat.  No gum bleeding, minor epistaxis.  Respiratory:  No shortness of breath, No cough, minimal sputum production, No hemoptysis, No wheezing.    Cardiovascular:  No chest pain, No palpitations, No tachycardia.     Gastrointestinal:  No nausea, No vomiting, No diarrhea, mild constipation, No abdominal pain.    Genitourinary:  No dysuria, No hematuria, No change in urine stream.    Hematology/Lymphatics:  + bruising tendency, No bleeding tendency, No swollen lymph glands.    Musculoskeletal:  No joint pain or back pain. No LE edema. R arm hematoma.  Integumentary:  No rash, No pruritus, + petechiae.    Neurologic:  Alert and oriented X4, No abnormal balance, + mild headache - improved.    PMHx:   CLL, ITP, HTN  PSHx: Appendectomy, Laparoscopic Cholecystectomy, R arm skin graft, Bone marrow biopsy, Umbilical hernia repair, Colonoscopy 7/17  SH: Lifetime nonsmoker, + Social alcohol use, Lives in [place] with his wife  FH: Father had prostate cancer, mother had renal cell carcinoma       OBJECTIVE:     Vital Signs (Most Recent)  Temp: (!) 100.4 °F (38 °C) (08/23/22 0630)  Pulse: 82 (08/23/22 0630)  Resp: 17 (08/23/22 0630)  BP: 118/68 (08/23/22 0630)  SpO2: 97 % (08/23/22 0630)    Physical Exam:  General: well developed, ill-appearing white male in NAD.  HEENT: normocephalic, atraumatic, conjunctivae/corneas clear.  OP clear, no lesions or petechiae.  Lungs:  Decreased breath sounds with faint rhonchi left lower chest.  Right lung clear.  Heart: regular rate and rhythm, no murmur.  Abdomen: soft, non-tender non-distented; bowel sounds normal; no masses or splenomegaly  Extremities: no LE edema.  Skin: Warm, intact. No rashes or lesions.  Scattered ecchymoses and petechiae on the extremities.  +hematoma involving right forearm.  Neurologic: Alert and oriented, no focal deficits, normal strength.  Gait normal.      Laboratory:  CBC with Differential:  Recent Labs   Lab 08/23/22  0208   WBC 10.5   RBC 2.62*   HCT 22.7*   HGB 8.0*   MCV 86.6   MCH 30.5   RDW 13.7   PLT 9*   MPV 11.4*     CMP:  No results for input(s): GLU, CALCIUM, ALBUMIN, PROT, NA, K, CO2, CL, BUN, CREATININE, ALKPHOS, ALT, AST, BILITOT in the last 24  hours.  BMP:   No results for input(s): GLU, CALCIUM, NA, K, CO2, CL, BUN, CREATININE in the last 24 hours.  LFTs:   No results for input(s): ALT, AST, ALKPHOS, BILITOT, PROT, ALBUMIN in the last 24 hours.    Diagnostic Results:  Chest x-ray and CT chest images reviewed.      ASSESSMENT/PLAN:   ASSESSMENT  Refractory ITP  Recurrent CLL - leukocytosis improved  LLL pneumonia  Anemia - stable      PLAN  Day 4 IV Cefipime, day 2 Azithromycin.  Blood cultures no growth to date.  Respiratory PCR panel pending.  May need to broaden antibiotic coverage to include anti-fungal.  Consult ID for evaluation and recommendations.  Transfuse 1 unit single donor platelets today.  Continue Promacta 100 mg daily.      DUKE LYNCH MD

## 2022-08-23 NOTE — PLAN OF CARE
Problem: Adult Inpatient Plan of Care  Goal: Plan of Care Review  Outcome: Ongoing, Progressing  Goal: Patient-Specific Goal (Individualized)  Outcome: Ongoing, Progressing  Goal: Absence of Hospital-Acquired Illness or Injury  Outcome: Ongoing, Progressing  Goal: Optimal Comfort and Wellbeing  Outcome: Ongoing, Progressing  Goal: Readiness for Transition of Care  Outcome: Ongoing, Progressing     Problem: Adjustment to Illness (Stroke, Hemorrhagic)  Goal: Optimal Coping  Outcome: Ongoing, Progressing     Problem: Bowel Elimination Impaired (Stroke, Hemorrhagic)  Goal: Effective Bowel Elimination  Outcome: Ongoing, Progressing     Problem: Cerebral Tissue Perfusion (Stroke, Hemorrhagic)  Goal: Optimal Cerebral Tissue Perfusion  Outcome: Ongoing, Progressing     Problem: Cognitive Impairment (Stroke, Hemorrhagic)  Goal: Optimal Cognitive Function  Outcome: Ongoing, Progressing     Problem: Communication Impairment (Stroke, Hemorrhagic)  Goal: Effective Communication Skills  Outcome: Ongoing, Progressing     Problem: Functional Ability Impaired (Stroke, Hemorrhagic)  Goal: Optimal Functional Ability  Outcome: Ongoing, Progressing     Problem: Pain (Stroke, Hemorrhagic)  Goal: Acceptable Pain Control  Outcome: Ongoing, Progressing     Problem: Respiratory Compromise (Stroke, Hemorrhagic)  Goal: Effective Oxygenation and Ventilation  Outcome: Ongoing, Progressing     Problem: Sensorimotor Impairment (Stroke, Hemorrhagic)  Goal: Improved Sensorimotor Function  Outcome: Ongoing, Progressing     Problem: Swallowing Impairment (Stroke, Hemorrhagic)  Goal: Optimal Eating and Swallowing Without Aspiration  Outcome: Ongoing, Progressing     Problem: Urinary Elimination Impaired (Stroke, Hemorrhagic)  Goal: Effective Urinary Elimination  Outcome: Ongoing, Progressing     Problem: Fall Injury Risk  Goal: Absence of Fall and Fall-Related Injury  Outcome: Ongoing, Progressing     Problem: Fluid Imbalance (Pneumonia)  Goal: Fluid  Balance  Outcome: Ongoing, Progressing     Problem: Infection (Pneumonia)  Goal: Resolution of Infection Signs and Symptoms  Outcome: Ongoing, Progressing     Problem: Respiratory Compromise (Pneumonia)  Goal: Effective Oxygenation and Ventilation  Outcome: Ongoing, Progressing

## 2022-08-24 NOTE — PROGRESS NOTES
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    Treatment History  Fludarabine/Rituxan x 5 cycles (1/15)  WinRho 5/18  Rituxan x4 (6/18)  Promacta 8/18-present    He was initially treated with 5 cycles of FR CLL with a CR.  Cycle 6 was held due to cumulative cytopenias despite dose reduction.  His counts recovered gradually.  He developed acute ITP 5/18 without evidence of recurrence of his CLL at that time.  He was refractory to treatment with prednisone, WinRho and Rituxan.  He responded to treatment with Promacta and was continued on treatment.  He developed recurrence of his CLL and was started on Acalabrutinib 4/25/22. Platelet count at that time was 140,000 and Promacta was held.  Treatment was well tolerated.      07/28/22: Plts 30,000. Resumed Promacta 50 mg/d.  08/08/22: Plts 1,000. Prednisone 40 mg/d.  08/10/22: Plts 1,000. IVIG 1 gm/kg x 2 days.  08/15/22: Plts 4,000. D/C Acalabrutinib and increased Promacta to 100 mg/d.  08/18/22: Plts 2,000. Transfuse 1 unit platelets on 8/19/22.    Patient fell at home on the evening of 8/18/22 landing on his side.  He did not lose consciousness, he denies hitting his head.  He did have a headache.  Has a hematoma involving the right forearm.  CBC showed progressive leukocytosis and anemia.  Hospital admission was recommended for additional workup and treatment.  He was alert and oriented.  He had no focal neurologic deficits.  He denied any fever or chills.  CT of the head without contrast showed a left parietal lobe heterogeneous hyperdensity - could not rule out partially calcified meningioma or small arachnoid hemorrhage.  Case was discussed with Neurosurgery. MRI brain 08/19/2022 revealed a 13 mm dural-based enhancing mass along the left parietal convexity most suggestive of meningioma with a trace subdural and subarachnoid hemorrhage along the right cerebral  convexity.      He was observed in the ICU with no progressive neurologic symptoms. He spiked a fever on 8/21/22 and blood cultures were obtained.  He was continued on IV Cefepime.  He received PRBC and platelet transfusions for symptomatic anemia and ongoing thrombocytopenia.  He continued to have fever with negative blood cultures.  Chest x-ray 08/22/22 showed a possible LLL infiltrate.  CT of the chest showed a dense LLL consolidation and additional patchy ground-glass opacities.  Respiratory PCR panel was ordered and azithromycin was added to his antibiotics.    Today (8/24/22):  Patient awake and alert this morning.  T-max 102.9° F at 3:00 a.m..  He has mild pleuritic pain in the left lower posterior chest.  Minimal cough, no significant sputum production.  Respiratory PCR panel was negative.  MRSA PCR negative.  Repeat COVID PCR negative.  Blood cultures from 08/19 and 8/22/22 are negative.  Respiratory culture pending.  Platelet count 11,000 today.      SUBJECTIVE:     Continuous Infusions:  Scheduled Meds:   azithromycin (ZITHROMAX) 250 mg IVPB  250 mg Intravenous Q24H    ceFEPime (MAXIPIME) IVPB  2 g Intravenous Q8H    eltrombopag  100 mg Oral Daily    guaiFENesin  600 mg Oral BID    multivitamin  1 tablet Oral Daily    pantoprazole  40 mg Oral Daily    predniSONE  20 mg Oral Daily     PRN Meds:    Review of patient's allergies indicates:   Allergen Reactions    Sulfa (sulfonamide antibiotics)      Other reaction(s): VOMITING    Sulfamethoxazole-trimethoprim Nausea Only and Nausea And Vomiting     Other reaction(s): Diaphoresis.        Review of Systems:  Constitutional: + fever, no chills, + generalized weakness, + fatigue.    Eye:  No icterus, No blurring, No double vision, No visual disturbances.    Ear/Nose/Mouth/Throat:  No decreased hearing, No nasal congestion, No sore throat.  No gum bleeding, no epistaxis.  Respiratory:  No shortness of breath, Occ cough, minimal sputum production, No  hemoptysis, No wheezing.    Cardiovascular:  No chest pain, No palpitations, No tachycardia.    Gastrointestinal:  No nausea, No vomiting, No diarrhea, mild constipation, No abdominal pain.    Genitourinary:  No dysuria, No hematuria, No change in urine stream.    Hematology/Lymphatics:  + bruising tendency, No bleeding tendency, No swollen lymph glands.    Musculoskeletal:  No joint pain or back pain. No LE edema. R arm hematoma.  Integumentary:  No rash, No pruritus, + petechiae.    Neurologic:  Alert and oriented X4, No abnormal balance, + mild headache - improved.    PMHx:   CLL, ITP, HTN  PSHx: Appendectomy, Laparoscopic Cholecystectomy, R arm skin graft, Bone marrow biopsy, Umbilical hernia repair, Colonoscopy 7/17  SH: Lifetime nonsmoker, + Social alcohol use, Lives in [place] with his wife  FH: Father had prostate cancer, mother had renal cell carcinoma       OBJECTIVE:     Vital Signs (Most Recent)  Temp: (!) 100.8 °F (38.2 °C) (08/24/22 0645)  Pulse: 91 (08/24/22 0645)  Resp: 18 (08/24/22 0645)  BP: 114/70 (08/24/22 0645)  SpO2: 95 % (08/24/22 0645)    Physical Exam:  General: well developed, ill-appearing white male in NAD.  HEENT: normocephalic, atraumatic, conjunctivae/corneas clear.  OP clear, no lesions or petechiae.  Lungs:  Decreased breath sounds and rhonchi left lower chest.  Right lung clear.  Heart: regular rate and rhythm, no murmur.  Abdomen: soft, non-tender non-distented; bowel sounds normal; no masses or splenomegaly  Extremities: no LE edema.  Skin: Warm, intact. No rashes or lesions.  Scattered ecchymoses and petechiae on the extremities.  +hematoma involving right forearm.  Neurologic: Alert and oriented, no focal deficits, normal strength.  Gait normal.      Laboratory:  CBC with Differential:  Recent Labs   Lab 08/24/22  0345   WBC 11.9*   RBC 2.49*   HCT 21.6*   HGB 7.4*   MCV 86.7   MCH 29.7   RDW 13.9   PLT 11*   MPV 11.1*     CMP:  Recent Labs   Lab 08/24/22  0345   CALCIUM 8.4*    ALBUMIN 3.0*      K 4.1   CO2 23   BUN 22.1   CREATININE 0.80   ALKPHOS 47   ALT 21   AST 9   BILITOT 2.1*     BMP:   Recent Labs   Lab 08/24/22  0345   CALCIUM 8.4*      K 4.1   CO2 23   BUN 22.1   CREATININE 0.80     LFTs:   Recent Labs   Lab 08/24/22  0345   ALT 21   AST 9   ALKPHOS 47   BILITOT 2.1*   ALBUMIN 3.0*       Diagnostic Results:  No new images for review      ASSESSMENT/PLAN:   ASSESSMENT  Refractory ITP  Recurrent CLL   LLL pneumonia  Persistent fever  Anemia       PLAN  Day 3 IV Cefipime and Azithromycin.  Ongoing fever. Follow ID recommendations.  Transfuse 1 unit platelets.  Hold PRBC transfusion for now.  Continue Promacta 100 mg daily.  Tapering prednisone secondary to lack of response.  Repeat CBC in a..      DUKE LYNCH MD

## 2022-08-24 NOTE — PROGRESS NOTES
SUBJECTIVE:  Still with some fevers.  Now on RA.  No SOB.  No issues overnight.       REVIEW OF SYSTEMS: Negative unless stated above         MEDICATIONS:   Reviewed in EMR        PHYSICAL EXAM:   Vitals:    08/24/22 0915   BP: (!) 145/74   Pulse: 77   Resp: 18   Temp: 100 °F (37.8 °C)          GENERAL: NAD; does not appear toxic  SKIN: no rash  HEENT: sclera non-icteric; PERRL  NECK: supple; no LAD  CHEST: Mostly CTA with rales noted to posterior LLL; nonlabored, equal expansion   CARDIOVASCULAR: RRR, S1S2; no murmur; strong, equal peripheral pulses; no edema  ABDOMEN:  active bowel sounds; abdomen soft, nondistended, nontender to palpation  EXTREMITIES: no cyanosis or clubbing  NEURO: AAO x4; CN II-XII grossly intact  PSYCH: Mentation and affect appropriate          LABORATORY DATA: Reviewed         RADIOLOGICAL DATA:   Imaging Results          MRI Brain W WO Contrast (Final result)  Result time 08/19/22 14:47:10    Final result by Michelle Martinez MD (08/19/22 14:47:10)                 Impression:      1. A 13 mm dural-based enhancing mass along the left parietal convexity is most suggestive of a meningioma.  2. Trace subdural and subarachnoid hemorrhage along the right cerebral convexity.      Electronically signed by: Michelle Martinez  Date:    08/19/2022  Time:    14:47             Narrative:    EXAMINATION:  MRI BRAIN W WO CONTRAST    CLINICAL HISTORY:  Abnormal CT scan, Neurosurgery recommended this MRI to differentiate meningioma versus ICH;    TECHNIQUE:  Multiplanar, multisequence MR images of the brain were obtained with and without administration of intravenous contrast.    COMPARISON:  CT of the head dated 08/19/2022    FINDINGS:  There is a dural-based enhancing mass along the left parietal convexity measuring 11 x 13 mm, with peripheral calcifications, dural tail and adjacent bony hypertrophy (series 10, image 11).  There is no adjacent edema.  There is trace subdural hemorrhage and subarachnoid  hemorrhage along the right cerebral convexity.    There is no acute infarct.  Mild scattered T2/FLAIR hyperintensities in the subcortical and periventricular white matter likely represent chronic microvascular ischemic changes.    There is local mass effect without midline shift or herniation.  The basal cisterns are patent.  The ventricles are normal in size.  The major intracranial flow voids are patent.  There is a right maxillary sinus mucosal retention cyst.  The mastoid air cells are clear.                               CT Head Without Contrast (Final result)  Result time 08/19/22 11:54:04    Final result by Albert Nixon MD (08/19/22 11:54:04)                 Impression:      Left parietal lobe subjacent to calvarial bony protuberance small heterogeneous hyperdensity which may represent a partially calcified meningioma versus hemorrhagic brain lesion versus a small subarachnoid hemorrhage.  Follow-up exams are recommended.      Electronically signed by: Albert Nixon  Date:    08/19/2022  Time:    11:54             Narrative:    EXAMINATION:  CT HEAD WITHOUT CONTRAST    CLINICAL HISTORY:  Head trauma, moderate-severe;    TECHNIQUE:  Sequential axial images were performed of the brain without contrast.    Dose product length of 1101 mGycm. Automated exposure control was utilized to minimize radiation dose.    COMPARISON:  None available.    FINDINGS:  There is left parietal lobe peripheral heterogeneous hyperdensity on axial image 23 series 2 and the sagittal image 9 series 44 with maximum size of 1.4 cm.  This is subjacent to a bony calvarial protrusion.  This may represent partially calcified meningioma, hemorrhagic brain lesion or a small subarachnoid hemorrhage.  There is no mass effect, midline shift or hydrocephalus.   There is no sulcal effacement or low attenuation changes to suggest recent large vessel territory infarction.    Right maxillary sinus small retention cyst.  Otherwise, visualized  paranasal sinuses are clear without mucosal thickening, polypoidal abnormality or air-fluid levels. Mastoid air cells aeration is optimal.                                      IMPRESSION:   He is a 67-year-old male with a past medical history of CLL and refractory ITP who presented after falling at home.  He was found to possibly have a small intracranial hemorrhage which resolved without intervention.  He then began with fevers, and imaging revealed a left lower lobe pneumonia.  Id consulted for input given patient's immunocompromised status.    1. Left lower lobe pneumonia  2. Sepsis s/t above  3. Possible ICH s/t fall at home, stable without intervention  4. H/o CLL and refractory ITP on chronic prednisone and Promacta       PLAN:  Sputum cx with normal damari thus far.  Respiratory PCR panel and MRSA PCR negative.   F/u urine Legionella Ag.  BCx negative thus far.  Continue cefepime and azithromycin for now.   Encouraged mobilization and use of IS as tolerated.  Discussed with patient and wife.

## 2022-08-24 NOTE — TELEPHONE ENCOUNTER
I confirmed with Dr. Velez the Promacta dose is 100 mg daily and that the last Rx we sent to his pharmacy was for Promacta 50 mg ii po daily with a disp of 60 tabs. I advised both patient and wife of this. Patient voiced concern that he would have to order this twice a month but I advised him we wrote for a disp of 60 tabs so he will only need to get it once a month. Patient voiced understanding and agreed to call with any other questions.

## 2022-08-24 NOTE — PLAN OF CARE
Problem: Adult Inpatient Plan of Care  Goal: Plan of Care Review  Outcome: Ongoing, Progressing  Flowsheets (Taken 8/23/2022 2210)  Plan of Care Reviewed With: patient  Goal: Patient-Specific Goal (Individualized)  Outcome: Ongoing, Progressing  Goal: Absence of Hospital-Acquired Illness or Injury  Outcome: Ongoing, Progressing  Intervention: Identify and Manage Fall Risk  Flowsheets (Taken 8/23/2022 2210)  Safety Promotion/Fall Prevention:   assistive device/personal item within reach   nonskid shoes/socks when out of bed   side rails raised x 2  Intervention: Prevent Skin Injury  Flowsheets (Taken 8/23/2022 2210)  Body Position: position changed independently  Skin Protection: adhesive use limited  Intervention: Prevent and Manage VTE (Venous Thromboembolism) Risk  Flowsheets (Taken 8/23/2022 2210)  Activity Management: Ambulated -L4  VTE Prevention/Management: ROM (active) performed  Range of Motion: ROM (range of motion) performed  Goal: Optimal Comfort and Wellbeing  Outcome: Ongoing, Progressing  Intervention: Monitor Pain and Promote Comfort  Flowsheets (Taken 8/23/2022 2210)  Pain Management Interventions:   care clustered   pain management plan reviewed with patient/caregiver  Intervention: Provide Person-Centered Care  Flowsheets (Taken 8/23/2022 2210)  Trust Relationship/Rapport:   care explained   questions encouraged   thoughts/feelings acknowledged  Goal: Readiness for Transition of Care  Outcome: Ongoing, Progressing     Problem: Adjustment to Illness (Stroke, Hemorrhagic)  Goal: Optimal Coping  Outcome: Ongoing, Progressing  Intervention: Support Psychosocial Response to Stroke  Flowsheets (Taken 8/23/2022 2210)  Supportive Measures: active listening utilized  Family/Support System Care: self-care encouraged     Problem: Bowel Elimination Impaired (Stroke, Hemorrhagic)  Goal: Effective Bowel Elimination  Outcome: Ongoing, Progressing  Intervention: Promote Effective Bowel Elimination  Flowsheets  (Taken 8/23/2022 2210)  Bowel Elimination Management: toileting offered     Problem: Cerebral Tissue Perfusion (Stroke, Hemorrhagic)  Goal: Optimal Cerebral Tissue Perfusion  Outcome: Ongoing, Progressing     Problem: Cognitive Impairment (Stroke, Hemorrhagic)  Goal: Optimal Cognitive Function  Outcome: Ongoing, Progressing     Problem: Communication Impairment (Stroke, Hemorrhagic)  Goal: Effective Communication Skills  Outcome: Ongoing, Progressing  Intervention: Optimize Communication Skills  Flowsheets (Taken 8/23/2022 2210)  Communication Enhancement Strategies: communication board used     Problem: Functional Ability Impaired (Stroke, Hemorrhagic)  Goal: Optimal Functional Ability  Outcome: Ongoing, Progressing  Intervention: Optimize Functional Ability  Flowsheets (Taken 8/23/2022 2210)  Self-Care Promotion: independence encouraged  Activity Management: Ambulated -L4     Problem: Pain (Stroke, Hemorrhagic)  Goal: Acceptable Pain Control  Outcome: Ongoing, Progressing  Intervention: Monitor and Manage Pain  Flowsheets (Taken 8/23/2022 2210)  Pain Management Interventions:   care clustered   pain management plan reviewed with patient/caregiver     Problem: Respiratory Compromise (Stroke, Hemorrhagic)  Goal: Effective Oxygenation and Ventilation  Outcome: Ongoing, Progressing     Problem: Sensorimotor Impairment (Stroke, Hemorrhagic)  Goal: Improved Sensorimotor Function  Outcome: Ongoing, Progressing  Intervention: Optimize Range of Motion, Motor Control and Function  Flowsheets (Taken 8/23/2022 2210)  Range of Motion: ROM (range of motion) performed  Intervention: Optimize Sensory and Perceptual Ability  Flowsheets (Taken 8/23/2022 2210)  Pressure Reduction Techniques: frequent weight shift encouraged     Problem: Swallowing Impairment (Stroke, Hemorrhagic)  Goal: Optimal Eating and Swallowing Without Aspiration  Outcome: Ongoing, Progressing     Problem: Urinary Elimination Impaired (Stroke,  Hemorrhagic)  Goal: Effective Urinary Elimination  Outcome: Ongoing, Progressing  Intervention: Promote Effective Bladder Elimination  Flowsheets (Taken 8/23/2022 2210)  Urinary Elimination Promotion:   frequent voiding encouraged   toileting device within reach     Problem: Fall Injury Risk  Goal: Absence of Fall and Fall-Related Injury  Outcome: Ongoing, Progressing  Intervention: Identify and Manage Contributors  Flowsheets (Taken 8/23/2022 2210)  Self-Care Promotion: independence encouraged  Intervention: Promote Injury-Free Environment  Flowsheets (Taken 8/23/2022 2210)  Safety Promotion/Fall Prevention:   assistive device/personal item within reach   nonskid shoes/socks when out of bed   side rails raised x 2     Problem: Fluid Imbalance (Pneumonia)  Goal: Fluid Balance  Outcome: Ongoing, Progressing     Problem: Infection (Pneumonia)  Goal: Resolution of Infection Signs and Symptoms  Outcome: Ongoing, Progressing     Problem: Respiratory Compromise (Pneumonia)  Goal: Effective Oxygenation and Ventilation  Outcome: Ongoing, Progressing  Intervention: Promote Airway Secretion Clearance  Flowsheets (Taken 8/23/2022 2210)  Breathing Techniques/Airway Clearance: deep/controlled cough encouraged  Cough And Deep Breathing: done independently per patient  Intervention: Optimize Oxygenation and Ventilation  Flowsheets (Taken 8/23/2022 2210)  Head of Bed (HOB) Positioning: HOB elevated

## 2022-08-24 NOTE — TELEPHONE ENCOUNTER
Attempted to contact pt regarding hosp f/u appt w/ CT on 09/07, but NA and to let him know I scheduled a 6 mnth f/u w/ MRI per Susan for 02/22/23 at 1:00; MRI on 02/17/23 at 9:00.

## 2022-08-25 NOTE — PLAN OF CARE
08/22/22 1113   Discharge Assessment   Assessment Type Discharge Planning Assessment   Confirmed/corrected address, phone number and insurance Yes   Confirmed Demographics Correct on Facesheet   Source of Information patient   Reason For Admission chronic thrombocytopenia   Lives With spouse  (Pt lives w his wife, Katina in a single story home with no steps to enter.)   Do you expect to return to your current living situation? Yes   Do you have help at home or someone to help you manage your care at home? Yes   Who are your caregiver(s) and their phone number(s)? pts wife, Katina--[phone]   Prior to hospitilization cognitive status: Alert/Oriented   Current cognitive status: Alert/Oriented   Walking or Climbing Stairs Difficulty none;other (see comments)  (Pt reports he has assess to a RW)   Dressing/Bathing Difficulty none   Home Layout Able to live on 1st floor   Equipment Currently Used at Home none   Patient currently being followed by outpatient case management? No   Do you currently have service(s) that help you manage your care at home? No   Who is going to help you get home at discharge? pt's wife, Katina   How do you get to doctors appointments? car, drives self   Are you on dialysis? No   Discharge Plan A Home  (Home)   Discharge Plan B Home with family  (Home)   DME Needed Upon Discharge  other (see comments)  (TBD)   Discharge Plan discussed with: Patient   Discharge Barriers Identified None

## 2022-08-25 NOTE — PLAN OF CARE
Problem: Fall Injury Risk  Goal: Absence of Fall and Fall-Related Injury  Outcome: Ongoing, Progressing  Intervention: Promote Injury-Free Environment  Flowsheets (Taken 8/25/2022 1434)  Safety Promotion/Fall Prevention:   assistive device/personal item within reach   nonskid shoes/socks when out of bed   side rails raised x 3   instructed to call staff for mobility   supervised activity

## 2022-08-25 NOTE — PROGRESS NOTES
SUBJECTIVE:  AF majority of day yesterday.  Felt somewhat febrile during the night, however no documented fevers until earlier today.  Reports associated symptoms.  Remains on RA.  Reports cough had been somewhat wet sounding, now dry today.  No events overnight.  Sitting in chair.      REVIEW OF SYSTEMS: Negative unless stated above         MEDICATIONS:   Reviewed in EMR        PHYSICAL EXAM:   Vitals:    08/25/22 1532   BP: 129/71   Pulse: 87   Resp:    Temp: (!) 101.5 °F (38.6 °C)          GENERAL: NAD; does not appear toxic  SKIN: no rash  HEENT: sclera non-icteric; PERRL  NECK: supple; no LAD  CHEST: Mostly CTA with rales noted to posterior LLL; nonlabored, equal expansion   CARDIOVASCULAR: RRR, S1S2; no murmur; strong, equal peripheral pulses; no edema  ABDOMEN:  active bowel sounds; abdomen soft, nondistended, nontender to palpation  EXTREMITIES: no cyanosis or clubbing  NEURO: AAO x4; CN II-XII grossly intact  PSYCH: Mentation and affect appropriate          LABORATORY DATA: Reviewed         RADIOLOGICAL DATA:   Imaging Results          MRI Brain W WO Contrast (Final result)  Result time 08/19/22 14:47:10    Final result by Michelle Martinez MD (08/19/22 14:47:10)                 Impression:      1. A 13 mm dural-based enhancing mass along the left parietal convexity is most suggestive of a meningioma.  2. Trace subdural and subarachnoid hemorrhage along the right cerebral convexity.      Electronically signed by: Michelle Martinez  Date:    08/19/2022  Time:    14:47             Narrative:    EXAMINATION:  MRI BRAIN W WO CONTRAST    CLINICAL HISTORY:  Abnormal CT scan, Neurosurgery recommended this MRI to differentiate meningioma versus ICH;    TECHNIQUE:  Multiplanar, multisequence MR images of the brain were obtained with and without administration of intravenous contrast.    COMPARISON:  CT of the head dated 08/19/2022    FINDINGS:  There is a dural-based enhancing mass along the left parietal convexity  measuring 11 x 13 mm, with peripheral calcifications, dural tail and adjacent bony hypertrophy (series 10, image 11).  There is no adjacent edema.  There is trace subdural hemorrhage and subarachnoid hemorrhage along the right cerebral convexity.    There is no acute infarct.  Mild scattered T2/FLAIR hyperintensities in the subcortical and periventricular white matter likely represent chronic microvascular ischemic changes.    There is local mass effect without midline shift or herniation.  The basal cisterns are patent.  The ventricles are normal in size.  The major intracranial flow voids are patent.  There is a right maxillary sinus mucosal retention cyst.  The mastoid air cells are clear.                               CT Head Without Contrast (Final result)  Result time 08/19/22 11:54:04    Final result by Albert Nixon MD (08/19/22 11:54:04)                 Impression:      Left parietal lobe subjacent to calvarial bony protuberance small heterogeneous hyperdensity which may represent a partially calcified meningioma versus hemorrhagic brain lesion versus a small subarachnoid hemorrhage.  Follow-up exams are recommended.      Electronically signed by: Albert Nixon  Date:    08/19/2022  Time:    11:54             Narrative:    EXAMINATION:  CT HEAD WITHOUT CONTRAST    CLINICAL HISTORY:  Head trauma, moderate-severe;    TECHNIQUE:  Sequential axial images were performed of the brain without contrast.    Dose product length of 1101 mGycm. Automated exposure control was utilized to minimize radiation dose.    COMPARISON:  None available.    FINDINGS:  There is left parietal lobe peripheral heterogeneous hyperdensity on axial image 23 series 2 and the sagittal image 9 series 44 with maximum size of 1.4 cm.  This is subjacent to a bony calvarial protrusion.  This may represent partially calcified meningioma, hemorrhagic brain lesion or a small subarachnoid hemorrhage.  There is no mass effect, midline shift or  hydrocephalus.   There is no sulcal effacement or low attenuation changes to suggest recent large vessel territory infarction.    Right maxillary sinus small retention cyst.  Otherwise, visualized paranasal sinuses are clear without mucosal thickening, polypoidal abnormality or air-fluid levels. Mastoid air cells aeration is optimal.                                      IMPRESSION:   He is a 67-year-old male with a past medical history of CLL and refractory ITP who presented after falling at home.  He was found to possibly have a small intracranial hemorrhage which resolved without intervention.  He then began with fevers, and imaging revealed a left lower lobe pneumonia.  Id consulted for input given patient's immunocompromised status.    1. Left lower lobe pneumonia  2. Sepsis s/t above  3. Possible ICH s/t fall at home, stable without intervention  4. H/o CLL and refractory ITP on chronic prednisone and Promacta       PLAN:  Cultures negative thus far.  Continues with fevers despite broad coverage.  Repeat CT chest with contrast to evaluate for complications including empyema and pulmonary abscess.  Repeat COVID-19 PCR as well.   F/u urine Legionella Ag.  Continue cefepime and azithromycin for now.   Discussed with patient and wife.

## 2022-08-25 NOTE — PROGRESS NOTES
"Inpatient Nutrition Evaluation    Admit Date: 8/19/2022   Nutrition Recommendation/Prescription     - Continue regular diet as tolerated.   - Continue MVI as medially feasible.    Nutrition Assessment     Chart Review    Reason Seen: length of stay    Diagnosis:  Refractory ITP  Recurrent CLL   LLL pneumonia  Persistent fever  Anemia     Relevant Medical History:   Chronic ITP, HTN, Leukemia    Nutrition-Related Medications: Azithromycin, Pepcid, MVI    Nutrition-Related Labs:  8/24:  Glu 117, Ca 8.4, GFR >60    Diet Order: Diet Adult Regular  Oral Supplement Order: none at this time  Appetite/Oral Intake: good/% of meals  Factors Affecting Nutritional Intake: none identified at this time  Food/Congregation/Cultural Preferences: none reported    Skin Integrity: bruised (ecchymotic)  Wound(s):   none noted    Comments    8/25: Pt in bed with spouse at bedside. Seen for LOS - reports good appetite currently as well as prior to admit. +BMs. No significant wt loss noted.     Anthropometrics    Height: 6' 0.05" (183 cm)    Last Weight: 119.4 kg (263 lb 4.8 oz) (08/19/22 2147) Weight Method: Bed Scale  BMI (Calculated): 35.7  BMI Classification: obese grade II (BMI 35-39.9)     Ideal Body Weight (IBW), Male: 178.3 lb  % Ideal Body Weight, Male (lb): 147.67 %  UBW: Per pt ~ 266lb      Wt Readings from Last 5 Encounters:   08/19/22 119.4 kg (263 lb 4.8 oz)   08/12/22 120 kg (264 lb 8.8 oz)   08/11/22 120 kg (264 lb 8.8 oz)   08/10/22 120 kg (264 lb 8.8 oz)   07/28/22 120.7 kg (266 lb 3.2 oz)     Weight Change(s) Since Admission: -1lb  Admit Weight: 120.2 kg (265 lb) (08/19/22 1038)    Patient Education    Not applicable.    Monitoring & Evaluation     Dietitian will monitor food and beverage intake and weight change.  Nutrition Risk/Follow-Up: low (follow-up in 5-7 days)  Patients assigned 'low nutrition risk' status do not qualify for a full nutritional assessment but will be monitored and re-evaluated in a 5-7 day time " period.

## 2022-08-25 NOTE — PROGRESS NOTES
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    Treatment History  Fludarabine/Rituxan x 5 cycles (1/15)  WinRho 5/18  Rituxan x4 (6/18)  Promacta 8/18-present    He was initially treated with 5 cycles of FR CLL with a CR.  Cycle 6 was held due to cumulative cytopenias despite dose reduction.  His counts recovered gradually.  He developed acute ITP 5/18 without evidence of recurrence of his CLL at that time.  He was refractory to treatment with prednisone, WinRho and Rituxan.  He responded to treatment with Promacta and was continued on treatment.  He developed recurrence of his CLL and was started on Acalabrutinib 4/25/22. Platelet count at that time was 140,000 and Promacta was held.  Treatment was well tolerated.      07/28/22: Plts 30,000. Resumed Promacta 50 mg/d.  08/08/22: Plts 1,000. Prednisone 40 mg/d.  08/10/22: Plts 1,000. IVIG 1 gm/kg x 2 days.  08/15/22: Plts 4,000. D/C Acalabrutinib and increased Promacta to 100 mg/d.  08/18/22: Plts 2,000. Transfuse 1 unit platelets on 8/19/22.    Patient fell at home on the evening of 8/18/22 landing on his side.  He did not lose consciousness, he denies hitting his head.  He did have a headache.  Has a hematoma involving the right forearm.  CBC showed progressive leukocytosis and anemia.  Hospital admission was recommended for additional workup and treatment.  He was alert and oriented.  He had no focal neurologic deficits.  He denied any fever or chills.  CT of the head without contrast showed a left parietal lobe heterogeneous hyperdensity - could not rule out partially calcified meningioma or small arachnoid hemorrhage.  Case was discussed with Neurosurgery. MRI brain 08/19/2022 revealed a 13 mm dural-based enhancing mass along the left parietal convexity most suggestive of meningioma with a trace subdural and subarachnoid hemorrhage along the right cerebral  convexity.      He was observed in the ICU with no progressive neurologic symptoms. He spiked a fever on 8/21/22 and blood cultures were obtained.  He was continued on IV Cefepime.  He received PRBC and platelet transfusions for symptomatic anemia and ongoing thrombocytopenia.  He continued to have fever with negative blood cultures.  Chest x-ray 08/22/22 showed a possible LLL infiltrate.  CT of the chest showed a dense LLL consolidation and additional patchy ground-glass opacities.  Azithromycin was added.  Respiratory PCR panel was negative.  MRSA PCR negative.  Repeat COVID PCR negative.       Today (8/25/22):  Patient feels better this morning.  No fever for the last 24 hours.  Significant shortness of breath.  Oxygen saturation 98% on room air.  Blood cultures from 08/19, 8/22 and 8/23/22 remain no growth to date.  No headaches or focal neurologic symptoms.  Hemoglobin stable 7.3, platelets 14,000. No bleeding.      SUBJECTIVE:     Continuous Infusions:  Scheduled Meds:   azithromycin (ZITHROMAX) 250 mg IVPB  250 mg Intravenous Q24H    ceFEPime (MAXIPIME) IVPB  2 g Intravenous Q8H    eltrombopag  100 mg Oral Daily    famotidine  20 mg Oral BID    guaiFENesin  600 mg Oral BID    multivitamin  1 tablet Oral Daily    predniSONE  20 mg Oral Daily     PRN Meds:    Review of patient's allergies indicates:   Allergen Reactions    Sulfa (sulfonamide antibiotics)      Other reaction(s): VOMITING    Sulfamethoxazole-trimethoprim Nausea Only and Nausea And Vomiting     Other reaction(s): Diaphoresis.        Review of Systems:  Constitutional: No fever, no chills, + generalized weakness, + fatigue.    Eye:  No icterus, No blurring, No double vision, No visual disturbances.    Ear/Nose/Mouth/Throat:  No decreased hearing, No nasal congestion, No sore throat.  No gum bleeding, no epistaxis.  Respiratory:  No shortness of breath, Occ cough, minimal sputum production, No hemoptysis, No wheezing.    Cardiovascular:  No  chest pain, No palpitations, No tachycardia.    Gastrointestinal:  No nausea, No vomiting, No diarrhea, mild constipation, No abdominal pain.    Genitourinary:  No dysuria, No hematuria, No change in urine stream.    Hematology/Lymphatics:  + bruising tendency, No bleeding tendency, No swollen lymph glands.    Musculoskeletal:  No joint pain or back pain. No LE edema. R arm hematoma.  Integumentary:  No rash, No pruritus, + petechiae.    Neurologic:  Alert and oriented X4, No abnormal balance, + mild headache - improved.    PMHx:   CLL, ITP, HTN  PSHx: Appendectomy, Laparoscopic Cholecystectomy, R arm skin graft, Bone marrow biopsy, Umbilical hernia repair, Colonoscopy 7/17  SH: Lifetime nonsmoker, + Social alcohol use, Lives in [place] with his wife  FH: Father had prostate cancer, mother had renal cell carcinoma       OBJECTIVE:     Vital Signs (Most Recent)  Temp: 100 °F (37.8 °C) (08/25/22 0300)  Pulse: 86 (08/25/22 0300)  Resp: 18 (08/25/22 0300)  BP: (!) 147/76 (08/25/22 0300)  SpO2: 99 % (08/25/22 0300)    Physical Exam:  General: well developed, ill-appearing white male in NAD.  HEENT: normocephalic, atraumatic, conjunctivae/corneas clear.  OP clear, no lesions or petechiae.  Lungs:  Decreased breath sounds and rhonchi left lower chest.  Right lung clear.  Heart: regular rate and rhythm, no murmur.  Abdomen: soft, non-tender non-distented; bowel sounds normal; no masses or organomegaly  Extremities: no LE edema.  Skin: Warm, intact. No rashes or lesions.  Scattered ecchymoses and petechiae on the extremities.  +hematoma involving right forearm.  Neurologic: Alert and oriented, no focal deficits, normal strength.  Gait normal.      Laboratory:  CBC with Differential:  Recent Labs   Lab 08/25/22  0403   WBC 19.6*   RBC 2.40*   HCT 20.7*   HGB 7.3*   MCV 86.3   MCH 30.4   RDW 13.9   PLT 14*   MPV 10.3     CMP:  No results for input(s): GLU, CALCIUM, ALBUMIN, PROT, NA, K, CO2, CL, BUN, CREATININE, ALKPHOS,  ALT, AST, BILITOT in the last 24 hours.  BMP:   No results for input(s): GLU, CALCIUM, NA, K, CO2, CL, BUN, CREATININE in the last 24 hours.  LFTs:   No results for input(s): ALT, AST, ALKPHOS, BILITOT, PROT, ALBUMIN in the last 24 hours.    Diagnostic Results:  No new images for review      ASSESSMENT/PLAN:   ASSESSMENT  Refractory ITP  Recurrent CLL   LLL pneumonia  Persistent fever  Anemia       PLAN  Day 4 IV Cefipime and Azithromycin.  Fever improved.  Continue current treatment.  Case discussed with ID.  Continue Promacta.  Discontinue prednisone.  Hold further transfusions and repeat labs in a.m..        DUKE LYNCH MD

## 2022-08-26 NOTE — PROGRESS NOTES
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    Treatment History  Fludarabine/Rituxan x 5 cycles (1/15)  WinRho 5/18  Rituxan x4 (6/18)  Promacta 8/18-present    He was initially treated with 5 cycles of FR CLL with a CR.  Cycle 6 was held due to cumulative cytopenias despite dose reduction.  His counts recovered gradually.  He developed acute ITP 5/18 without evidence of recurrence of his CLL at that time.  He was refractory to treatment with prednisone, WinRho and Rituxan.  He responded to treatment with Promacta and was continued on treatment.  He developed recurrence of his CLL and was started on Acalabrutinib 4/25/22. Platelet count at that time was 140,000 and Promacta was held.  Treatment was well tolerated.      07/28/22: Plts 30,000. Resumed Promacta 50 mg/d.  08/08/22: Plts 1,000. Prednisone 40 mg/d.  08/10/22: Plts 1,000. IVIG 1 gm/kg x 2 days.  08/15/22: Plts 4,000. D/C Acalabrutinib and increased Promacta to 100 mg/d.  08/18/22: Plts 2,000. Transfuse 1 unit platelets on 8/19/22.    Patient fell at home on the evening of 8/18/22 landing on his side.  He did not lose consciousness, he denies hitting his head.  He did have a headache.  Has a hematoma involving the right forearm.  CBC showed progressive leukocytosis and anemia.  Hospital admission was recommended for additional workup and treatment.  He was alert and oriented.  He had no focal neurologic deficits.  He denied any fever or chills.  CT of the head without contrast showed a left parietal lobe heterogeneous hyperdensity - could not rule out partially calcified meningioma or small arachnoid hemorrhage.  Case was discussed with Neurosurgery. MRI brain 08/19/2022 revealed a 13 mm dural-based enhancing mass along the left parietal convexity most suggestive of meningioma with a trace subdural and subarachnoid hemorrhage along the right cerebral  convexity.      He was observed in the ICU with no progressive neurologic symptoms. He spiked a fever on 8/21/22 and blood cultures were obtained.  He was continued on IV Cefepime.  He received PRBC and platelet transfusions for symptomatic anemia and ongoing thrombocytopenia.  He continued to have fever with negative blood cultures.  Chest x-ray 08/22/22 showed a possible LLL infiltrate.  CT of the chest showed a dense LLL consolidation and additional patchy ground-glass opacities.  Azithromycin was added.  Respiratory PCR panel was negative.  MRSA PCR negative.  Repeat COVID PCR negative.       Today (8/26/22):  Patient still having intermittent fever.  T-max 101.8° F at 10:00 p.m. last night.  He had mild hematuria early this a.m. and was given 1 unit of platelets.  Platelet count today 13,000 post transfusion.  Hemoglobin decreased to 6.3.  2 units PRBCs ordered for transfusion. CT C/A/P showed an unchanged dense LLL consolidation with slight improvement of multifocal ground-glass opacities.  There was prominent paratracheal, retroperitoneal and mesenteric adenopathy and mild splenomegaly.  There was a large left renal cyst with no evidence of hydronephrosis.      SUBJECTIVE:     Continuous Infusions:  Scheduled Meds:   azithromycin (ZITHROMAX) 250 mg IVPB  250 mg Intravenous Q24H    ceFEPime (MAXIPIME) IVPB  2 g Intravenous Q8H    eltrombopag  100 mg Oral Daily    famotidine  20 mg Oral BID    guaiFENesin  600 mg Oral BID    multivitamin  1 tablet Oral Daily     PRN Meds:    Review of patient's allergies indicates:   Allergen Reactions    Sulfa (sulfonamide antibiotics)      Other reaction(s): VOMITING    Sulfamethoxazole-trimethoprim Nausea Only and Nausea And Vomiting     Other reaction(s): Diaphoresis.        Review of Systems:  Constitutional: + fever, no chills, + generalized weakness, + fatigue.    Eye:  No icterus, No blurring, No double vision, No visual disturbances.    Ear/Nose/Mouth/Throat:   No decreased hearing, No nasal congestion, No sore throat.  No gum bleeding, no epistaxis.  Respiratory:  No shortness of breath, Occ cough, minimal sputum production, No hemoptysis, No wheezing.    Cardiovascular:  No chest pain, No palpitations, No tachycardia.    Gastrointestinal:  No nausea, No vomiting, No diarrhea, mild constipation, No abdominal pain.    Genitourinary:  No dysuria, Mild hematuria, No change in urine stream.    Hematology/Lymphatics:  + bruising tendency, + bleeding tendency, No swollen lymph glands.    Musculoskeletal:  No joint pain or back pain. No LE edema. R arm hematoma.  Integumentary:  No rash, No pruritus, + petechiae.    Neurologic:  Alert and oriented X4, No abnormal balance, No headache.    PMHx:   CLL, ITP, HTN  PSHx: Appendectomy, Laparoscopic Cholecystectomy, R arm skin graft, Bone marrow biopsy, Umbilical hernia repair, Colonoscopy 7/17  SH: Lifetime nonsmoker, + Social alcohol use, Lives in [place] with his wife  FH: Father had prostate cancer, mother had renal cell carcinoma       OBJECTIVE:     Vital Signs (Most Recent)  Temp: 100.2 °F (37.9 °C) (08/26/22 0629)  Pulse: 78 (08/26/22 0629)  Resp: 19 (08/26/22 0629)  BP: 133/73 (08/26/22 0629)  SpO2: 97 % (08/26/22 0629)    Physical Exam:  General: well developed, ill-appearing white male in NAD.  HEENT: normocephalic, atraumatic, conjunctivae/corneas clear.  OP clear, no lesions or petechiae.  Lungs:  Decreased breath sounds and rhonchi left lower chest.  Right lung clear.  Heart: regular rate and rhythm, no murmur.  Abdomen: soft, non-tender non-distented; bowel sounds normal; no palpable masses or organomegaly  Extremities: no LE edema.  Skin: Warm, intact. No rashes or lesions.  Scattered ecchymoses and petechiae on the extremities.  +hematoma involving right forearm.  Neurologic: Alert and oriented, no focal deficits, normal strength.  Gait normal.      Laboratory:  CBC with Differential:  Recent Labs   Lab  08/26/22  0328   WBC 12.6*   RBC 2.09*   HCT 18.1*   HGB 6.3*   MCV 86.6   MCH 30.1   RDW 13.9   PLT 13*   MPV 11.1*     CMP:  No results for input(s): GLU, CALCIUM, ALBUMIN, PROT, NA, K, CO2, CL, BUN, CREATININE, ALKPHOS, ALT, AST, BILITOT in the last 24 hours.  BMP:   No results for input(s): GLU, CALCIUM, NA, K, CO2, CL, BUN, CREATININE in the last 24 hours.  LFTs:   No results for input(s): ALT, AST, ALKPHOS, BILITOT, PROT, ALBUMIN in the last 24 hours.    Diagnostic Results:  CT images reviewed.      ASSESSMENT/PLAN:   ASSESSMENT  Refractory ITP  Recurrent CLL   LLL pneumonia  Persistent fever  Anemia       PLAN  Day 5 IV Cefipime and Azithromycin.  Persistent fever.  No new culture results.  He remains transfusion dependent for PRBCs and platelets.  Not responding to Promacta as he did previously.  He received 1 unit of platelets last night.  Also transfusing 2 units PRBCs.  Will schedule a bone marrow aspirate and biopsy on Monday 8/29/22.  Daily labs.        DUKE LYNCH MD

## 2022-08-26 NOTE — PROGRESS NOTES
SUBJECTIVE:  Still with fevers.  Otherwise, no complaints.  Remains on RA.      REVIEW OF SYSTEMS: Negative unless stated above         MEDICATIONS:   Reviewed in EMR        PHYSICAL EXAM:   Vitals:    08/26/22 1426   BP: 120/73   Pulse: 79   Resp: 16   Temp: 99.6 °F (37.6 °C)      GENERAL: NAD; does not appear toxic  SKIN: no rash  HEENT: sclera non-icteric; PERRL  NECK: supple; no LAD  CHEST: Mostly CTA with rales noted to posterior LLL; nonlabored, equal expansion   CARDIOVASCULAR: RRR, S1S2; no murmur; strong, equal peripheral pulses; no edema  ABDOMEN:  active bowel sounds; abdomen soft, nondistended, nontender to palpation  EXTREMITIES: no cyanosis or clubbing  NEURO: AAO x4; CN II-XII grossly intact  PSYCH: Mentation and affect appropriate          LABORATORY DATA: Reviewed         RADIOLOGICAL DATA: Reviewed       IMPRESSION:   He is a 67-year-old male with a past medical history of CLL and refractory ITP who presented after falling at home.  He was found to possibly have a small intracranial hemorrhage which resolved without intervention.  He then began with fevers, and imaging revealed a left lower lobe pneumonia.  Id consulted for input given patient's immunocompromised status.    1. Left lower lobe pneumonia  2. Sepsis s/t above  3. Possible ICH s/t fall at home, stable without intervention  4. H/o CLL and refractory ITP on chronic prednisone and Promacta       PLAN:  CT findings noted.  Still having fevers.  Will change cefepime to meropenem and continue azithromycin for now.  Send Fungitell and Aspergillus serum Ag.  Legionella urine Ag negative.  Repeat COVID-19 PCR negative.  Need to consider noninfectious causes as well.  Oncology planning on bone marrow biopsy on Monday - f/u pathology.   Discussed with patient, wife, and nursing.

## 2022-08-26 NOTE — PLAN OF CARE
Problem: Adult Inpatient Plan of Care  Goal: Plan of Care Review  Outcome: Ongoing, Progressing  Flowsheets (Taken 8/25/2022 2000)  Plan of Care Reviewed With: patient  Goal: Patient-Specific Goal (Individualized)  Outcome: Ongoing, Progressing  Goal: Absence of Hospital-Acquired Illness or Injury  Outcome: Ongoing, Progressing  Intervention: Identify and Manage Fall Risk  Flowsheets (Taken 8/25/2022 2000)  Safety Promotion/Fall Prevention:   assistive device/personal item within reach   Fall Risk signage in place   Fall Risk reviewed with patient/family   nonskid shoes/socks when out of bed   side rails raised x 2   medications reviewed   lighting adjusted  Intervention: Prevent Skin Injury  Flowsheets (Taken 8/25/2022 2000)  Body Position: sitting up in bed  Skin Protection:   adhesive use limited   tubing/devices free from skin contact  Intervention: Prevent and Manage VTE (Venous Thromboembolism) Risk  Flowsheets (Taken 8/25/2022 2000)  VTE Prevention/Management:   bleeding risk assessed   bleeding precations maintained  Range of Motion: ROM (range of motion) performed  Intervention: Prevent Infection  Flowsheets (Taken 8/25/2022 2000)  Infection Prevention:   single patient room provided   hand hygiene promoted   rest/sleep promoted  Goal: Optimal Comfort and Wellbeing  Outcome: Ongoing, Progressing  Intervention: Monitor Pain and Promote Comfort  Flowsheets (Taken 8/25/2022 2000)  Pain Management Interventions:   quiet environment facilitated   care clustered  Intervention: Provide Person-Centered Care  Flowsheets (Taken 8/25/2022 2000)  Trust Relationship/Rapport:   care explained   choices provided   emotional support provided   empathic listening provided   questions answered   thoughts/feelings acknowledged  Goal: Readiness for Transition of Care  Outcome: Ongoing, Progressing     Problem: Adjustment to Illness (Stroke, Hemorrhagic)  Goal: Optimal Coping  Outcome: Ongoing, Progressing     Problem: Bowel  Elimination Impaired (Stroke, Hemorrhagic)  Goal: Effective Bowel Elimination  Outcome: Ongoing, Progressing     Problem: Cerebral Tissue Perfusion (Stroke, Hemorrhagic)  Goal: Optimal Cerebral Tissue Perfusion  Outcome: Ongoing, Progressing     Problem: Cognitive Impairment (Stroke, Hemorrhagic)  Goal: Optimal Cognitive Function  Outcome: Ongoing, Progressing     Problem: Communication Impairment (Stroke, Hemorrhagic)  Goal: Effective Communication Skills  Outcome: Ongoing, Progressing     Problem: Functional Ability Impaired (Stroke, Hemorrhagic)  Goal: Optimal Functional Ability  Outcome: Ongoing, Progressing  Intervention: Optimize Functional Ability  Flowsheets (Taken 8/25/2022 2243)  Activity Management: Ambulated to bathroom - L4     Problem: Fall Injury Risk  Goal: Absence of Fall and Fall-Related Injury  Outcome: Ongoing, Progressing  Intervention: Identify and Manage Contributors  Flowsheets (Taken 8/25/2022 2000)  Medication Review/Management: medications reviewed

## 2022-08-27 NOTE — PLAN OF CARE
Problem: Adult Inpatient Plan of Care  Goal: Plan of Care Review  Outcome: Ongoing, Progressing  Flowsheets (Taken 8/27/2022 0034)  Plan of Care Reviewed With:   patient   significant other  Goal: Patient-Specific Goal (Individualized)  Outcome: Ongoing, Progressing  Goal: Absence of Hospital-Acquired Illness or Injury  Outcome: Ongoing, Progressing  Intervention: Identify and Manage Fall Risk  Flowsheets (Taken 8/27/2022 0034)  Safety Promotion/Fall Prevention:   assistive device/personal item within reach   Fall Risk reviewed with patient/family   Fall Risk signage in place   medications reviewed   nonskid shoes/socks when out of bed  Intervention: Prevent Skin Injury  Flowsheets (Taken 8/27/2022 0034)  Body Position: position changed independently  Skin Protection:   adhesive use limited   transparent dressing maintained   tubing/devices free from skin contact  Intervention: Prevent and Manage VTE (Venous Thromboembolism) Risk  Flowsheets (Taken 8/27/2022 0034)  VTE Prevention/Management:   bleeding risk assessed   bleeding precations maintained  Range of Motion:   active ROM (range of motion) encouraged   ROM (range of motion) performed  Intervention: Prevent Infection  Flowsheets (Taken 8/27/2022 0034)  Infection Prevention:   rest/sleep promoted   single patient room provided   hand hygiene promoted  Goal: Optimal Comfort and Wellbeing  Outcome: Ongoing, Progressing  Intervention: Monitor Pain and Promote Comfort  Flowsheets (Taken 8/27/2022 0034)  Pain Management Interventions: quiet environment facilitated  Intervention: Provide Person-Centered Care  Flowsheets (Taken 8/27/2022 0034)  Trust Relationship/Rapport:   care explained   questions encouraged   choices provided   reassurance provided   emotional support provided   thoughts/feelings acknowledged   empathic listening provided   questions answered  Goal: Readiness for Transition of Care  Outcome: Ongoing, Progressing     Problem: Adjustment to  Illness (Stroke, Hemorrhagic)  Goal: Optimal Coping  Outcome: Ongoing, Progressing     Problem: Bowel Elimination Impaired (Stroke, Hemorrhagic)  Goal: Effective Bowel Elimination  Outcome: Ongoing, Progressing     Problem: Cerebral Tissue Perfusion (Stroke, Hemorrhagic)  Goal: Optimal Cerebral Tissue Perfusion  Outcome: Ongoing, Progressing     Problem: Cognitive Impairment (Stroke, Hemorrhagic)  Goal: Optimal Cognitive Function  Outcome: Ongoing, Progressing     Problem: Pain (Stroke, Hemorrhagic)  Goal: Acceptable Pain Control  Outcome: Ongoing, Progressing     Problem: Infection (Pneumonia)  Goal: Resolution of Infection Signs and Symptoms  Outcome: Ongoing, Progressing  Intervention: Prevent Infection Progression  Flowsheets (Taken 8/27/2022 0034)  Infection Management: (Antibiotic therapy) other (see comments)     Problem: Respiratory Compromise (Pneumonia)  Goal: Effective Oxygenation and Ventilation  Outcome: Ongoing, Progressing  Intervention: Promote Airway Secretion Clearance  Flowsheets (Taken 8/27/2022 0034)  Breathing Techniques/Airway Clearance: (Incentive Spirometer) other (see comments)

## 2022-08-27 NOTE — PROGRESS NOTES
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    Treatment History  Fludarabine/Rituxan x 5 cycles (1/15)  WinRho 5/18  Rituxan x4 (6/18)  Promacta 8/18-present    He was initially treated with 5 cycles of FR CLL with a CR.  Cycle 6 was held due to cumulative cytopenias despite dose reduction.  His counts recovered gradually.  He developed acute ITP 5/18 without evidence of recurrence of his CLL at that time.  He was refractory to treatment with prednisone, WinRho and Rituxan.  He responded to treatment with Promacta and was continued on treatment.  He developed recurrence of his CLL and was started on Acalabrutinib 4/25/22. Platelet count at that time was 140,000 and Promacta was held.  Treatment was well tolerated.      07/28/22: Plts 30,000. Resumed Promacta 50 mg/d.  08/08/22: Plts 1,000. Prednisone 40 mg/d.  08/10/22: Plts 1,000. IVIG 1 gm/kg x 2 days.  08/15/22: Plts 4,000. D/C Acalabrutinib and increased Promacta to 100 mg/d.  08/18/22: Plts 2,000. Transfuse 1 unit platelets on 8/19/22.    Patient fell at home on the evening of 8/18/22 landing on his side.  He did not lose consciousness, he denies hitting his head.  He did have a headache.  Has a hematoma involving the right forearm.  CBC showed progressive leukocytosis and anemia.  Hospital admission was recommended for additional workup and treatment.  He was alert and oriented.  He had no focal neurologic deficits.  He denied any fever or chills.  CT of the head without contrast showed a left parietal lobe heterogeneous hyperdensity - could not rule out partially calcified meningioma or small arachnoid hemorrhage.  Case was discussed with Neurosurgery. MRI brain 08/19/2022 revealed a 13 mm dural-based enhancing mass along the left parietal convexity most suggestive of meningioma with a trace subdural and subarachnoid hemorrhage along the right cerebral  convexity.      He was observed in the ICU with no progressive neurologic symptoms. He spiked a fever on 8/21/22 and blood cultures were obtained.  He was continued on IV Cefepime.  He received PRBC and platelet transfusions for symptomatic anemia and ongoing thrombocytopenia.  He continued to have fever with negative blood cultures.  Chest x-ray 08/22/22 showed a possible LLL infiltrate.  CT of the chest showed a dense LLL consolidation and additional patchy ground-glass opacities.  Azithromycin was added.  Respiratory PCR panel was negative.  MRSA PCR negative.  Repeat COVID PCR negative.     (8/26/22):  Patient still having intermittent fever.  T-max 101.8° F at 10:00 p.m. last night.  He had mild hematuria early this a.m. and was given 1 unit of platelets.  Platelet count today 13,000 post transfusion.  Hemoglobin decreased to 6.3.  2 units PRBCs ordered for transfusion. CT C/A/P showed an unchanged dense LLL consolidation with slight improvement of multifocal ground-glass opacities.  There was prominent paratracheal, retroperitoneal and mesenteric adenopathy and mild splenomegaly.  There was a large left renal cyst with no evidence of hydronephrosis.    Today (8/27/22):  Patient is seen in rounds. Wife at bedside. He continues to spike fever with T max 102.9. Platelets this morning 7,000. Hb 7.4. No hematuria, BRBPR or melena. No bleeding. He reports occ dry cough about the same.       SUBJECTIVE:     Continuous Infusions:  Scheduled Meds:   azithromycin (ZITHROMAX) 250 mg IVPB  250 mg Intravenous Q24H    eltrombopag  100 mg Oral Daily    famotidine  20 mg Oral BID    guaiFENesin  600 mg Oral BID    meropenem (MERREM) IVPB  1 g Intravenous Q8H    multivitamin  1 tablet Oral Daily     PRN Meds:    Review of patient's allergies indicates:   Allergen Reactions    Sulfa (sulfonamide antibiotics)      Other reaction(s): VOMITING    Sulfamethoxazole-trimethoprim Nausea Only and Nausea And Vomiting     Other  reaction(s): Diaphoresis.        Review of Systems:  Constitutional: + fever, no chills, + generalized weakness, + fatigue.    Eye:  No icterus, No blurring, No double vision, No visual disturbances.    Ear/Nose/Mouth/Throat:  No decreased hearing, No nasal congestion, No sore throat.  No gum bleeding, no epistaxis.  Respiratory:  No shortness of breath, Occ cough, minimal sputum production, No hemoptysis, No wheezing.    Cardiovascular:  No chest pain, No palpitations, No tachycardia.    Gastrointestinal:  No nausea, No vomiting, No diarrhea, mild constipation, No abdominal pain.    Genitourinary:  No dysuria, Mild hematuria, No change in urine stream.    Hematology/Lymphatics:  + bruising tendency, + bleeding tendency, No swollen lymph glands.    Musculoskeletal:  No joint pain or back pain. No LE edema. R arm hematoma.  Integumentary:  No rash, No pruritus, + petechiae.    Neurologic:  Alert and oriented X4, No abnormal balance, No headache.    PMHx:   CLL, ITP, HTN  PSHx: Appendectomy, Laparoscopic Cholecystectomy, R arm skin graft, Bone marrow biopsy, Umbilical hernia repair, Colonoscopy 7/17  SH: Lifetime nonsmoker, + Social alcohol use, Lives in [place] with his wife  FH: Father had prostate cancer, mother had renal cell carcinoma       OBJECTIVE:     Vital Signs (Most Recent)  Temp: (!) 100.7 °F (38.2 °C) (08/27/22 0857)  Pulse: 87 (08/27/22 0857)  Resp: 18 (08/27/22 0739)  BP: 131/72 (08/27/22 0857)  SpO2: 96 % (08/27/22 0739)    Physical Exam:  General: well developed, ill-appearing white male in NAD.  HEENT: normocephalic, atraumatic, conjunctivae/corneas clear.  OP clear, no lesions or petechiae.  Lungs:  Decreased breath sounds and rhonchi left lower chest.  Right lung clear.  Heart: regular rate and rhythm, no murmur.  Abdomen: soft, non-tender non-distented; bowel sounds normal; no palpable masses or organomegaly  Extremities: no LE edema.  Skin: Warm, intact. No rashes or lesions.  Scattered  ecchymoses and petechiae on the extremities.  +hematoma involving right forearm.  Neurologic: Alert and oriented, no focal deficits, normal strength.  Gait normal.      Laboratory:  CBC with Differential:  Recent Labs   Lab 08/27/22  0341   WBC 10.6   RBC 2.43*   HCT 21.4*   HGB 7.4*   MCV 88.1   MCH 30.5   RDW 13.9   PLT 7*   MPV 11.4*     CMP:  Recent Labs   Lab 08/27/22  0341   CALCIUM 8.1*   ALBUMIN 2.7*   *   K 4.1   CO2 22*   BUN 16.6   CREATININE 0.76   ALKPHOS 64   ALT 59*   AST 25   BILITOT 1.7*     BMP:   Recent Labs   Lab 08/27/22  0341   CALCIUM 8.1*   *   K 4.1   CO2 22*   BUN 16.6   CREATININE 0.76     LFTs:   Recent Labs   Lab 08/27/22  0341   ALT 59*   AST 25   ALKPHOS 64   BILITOT 1.7*   ALBUMIN 2.7*       Diagnostic Results:  CT images reviewed.      ASSESSMENT/PLAN:   ASSESSMENT  Refractory ITP  Recurrent CLL   LLL pneumonia  Persistent fever  Anemia       PLAN  Day 6 IV Azithromycin.  Day 2 Merrpenem  Cefepime was d/c yesterday (had total 5 days)  Persistent fever.  No new culture results.  He remains transfusion dependent for PRBCs and platelets.    On Promacta 100 mg-Not responding to Promacta as he did previously  Will transfuse 1 unit of platelets today.   Will hold blood transfusion today, may need tomorrow.  Will continue to monitor counts daily.   Bone marrow aspirate and biopsy on Monday 8/29/22.      Manju Ellington MD  Hematology/Oncology

## 2022-08-28 NOTE — PROGRESS NOTES
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    Treatment History  Fludarabine/Rituxan x 5 cycles (1/15)  WinRho 5/18  Rituxan x4 (6/18)  Promacta 8/18-present    He was initially treated with 5 cycles of FR CLL with a CR.  Cycle 6 was held due to cumulative cytopenias despite dose reduction.  His counts recovered gradually.  He developed acute ITP 5/18 without evidence of recurrence of his CLL at that time.  He was refractory to treatment with prednisone, WinRho and Rituxan.  He responded to treatment with Promacta and was continued on treatment.  He developed recurrence of his CLL and was started on Acalabrutinib 4/25/22. Platelet count at that time was 140,000 and Promacta was held.  Treatment was well tolerated.      07/28/22: Plts 30,000. Resumed Promacta 50 mg/d.  08/08/22: Plts 1,000. Prednisone 40 mg/d.  08/10/22: Plts 1,000. IVIG 1 gm/kg x 2 days.  08/15/22: Plts 4,000. D/C Acalabrutinib and increased Promacta to 100 mg/d.  08/18/22: Plts 2,000. Transfuse 1 unit platelets on 8/19/22.    Patient fell at home on the evening of 8/18/22 landing on his side.  He did not lose consciousness, he denies hitting his head.  He did have a headache.  Has a hematoma involving the right forearm.  CBC showed progressive leukocytosis and anemia.  Hospital admission was recommended for additional workup and treatment.  He was alert and oriented.  He had no focal neurologic deficits.  He denied any fever or chills.  CT of the head without contrast showed a left parietal lobe heterogeneous hyperdensity - could not rule out partially calcified meningioma or small arachnoid hemorrhage.  Case was discussed with Neurosurgery. MRI brain 08/19/2022 revealed a 13 mm dural-based enhancing mass along the left parietal convexity most suggestive of meningioma with a trace subdural and subarachnoid hemorrhage along the right cerebral  convexity.      He was observed in the ICU with no progressive neurologic symptoms. He spiked a fever on 8/21/22 and blood cultures were obtained.  He was continued on IV Cefepime.  He received PRBC and platelet transfusions for symptomatic anemia and ongoing thrombocytopenia.  He continued to have fever with negative blood cultures.  Chest x-ray 08/22/22 showed a possible LLL infiltrate.  CT of the chest showed a dense LLL consolidation and additional patchy ground-glass opacities.  Azithromycin was added.  Respiratory PCR panel was negative.  MRSA PCR negative.  Repeat COVID PCR negative.     (8/26/22):  Patient still having intermittent fever.  T-max 101.8° F at 10:00 p.m. last night.  He had mild hematuria early this a.m. and was given 1 unit of platelets.  Platelet count today 13,000 post transfusion.  Hemoglobin decreased to 6.3.  2 units PRBCs ordered for transfusion. CT C/A/P showed an unchanged dense LLL consolidation with slight improvement of multifocal ground-glass opacities.  There was prominent paratracheal, retroperitoneal and mesenteric adenopathy and mild splenomegaly.  There was a large left renal cyst with no evidence of hydronephrosis.    (8/27/22):  Patient is seen in rounds. Wife at bedside. He continues to spike fever with T max 102.9. Platelets this morning 7,000. Hb 7.4. No hematuria, BRBPR or melena. No bleeding. He reports occ dry cough about the same.     Today (8/28/22): Patient continues to spike fever Tmax 103.1. He just received Tylenol and has ice packs. He had a unit of platelets yesterday and today platelets 12k. No bleeding. Anemia stable with Hb 7.5. Wife and patient requesting platelet transfusion as today is 12k, (up from 7k yesterday) and they are afraid that will decrease and he start bleeding. I discuss with them that because he is having fever and this can decrease platelets I will recheck his platelets later today and if decreasing then will transfuse, they are not very  happy but agreed with the plan. They know that for any evidence of bleeding her will have platelets regardless the number.      SUBJECTIVE:     Continuous Infusions:  Scheduled Meds:   azithromycin (ZITHROMAX) 250 mg IVPB  250 mg Intravenous Q24H    eltrombopag  100 mg Oral Daily    famotidine  20 mg Oral BID    guaiFENesin  600 mg Oral BID    meropenem (MERREM) IVPB  1 g Intravenous Q8H    multivitamin  1 tablet Oral Daily     PRN Meds:    Review of patient's allergies indicates:   Allergen Reactions    Sulfa (sulfonamide antibiotics)      Other reaction(s): VOMITING    Sulfamethoxazole-trimethoprim Nausea Only and Nausea And Vomiting     Other reaction(s): Diaphoresis.        Review of Systems:  Constitutional: + fever, no chills, + generalized weakness, + fatigue.    Eye:  No icterus, No blurring, No double vision, No visual disturbances.    Ear/Nose/Mouth/Throat:  No decreased hearing, No nasal congestion, No sore throat.  No gum bleeding, no epistaxis.  Respiratory:  No shortness of breath, Occ cough, minimal sputum production, No hemoptysis, No wheezing.    Cardiovascular:  No chest pain, No palpitations, No tachycardia.    Gastrointestinal:  No nausea, No vomiting, No diarrhea, mild constipation, No abdominal pain.    Genitourinary:  No dysuria, Mild hematuria, No change in urine stream.    Hematology/Lymphatics:  + bruising tendency, + bleeding tendency, No swollen lymph glands.    Musculoskeletal:  No joint pain or back pain. No LE edema. R arm hematoma.  Integumentary:  No rash, No pruritus, + petechiae.    Neurologic:  Alert and oriented X4, No abnormal balance, No headache.    PMHx:   CLL, ITP, HTN  PSHx: Appendectomy, Laparoscopic Cholecystectomy, R arm skin graft, Bone marrow biopsy, Umbilical hernia repair, Colonoscopy 7/17  SH: Lifetime nonsmoker, + Social alcohol use, Lives in [place] with his wife  FH: Father had prostate cancer, mother had renal cell carcinoma       OBJECTIVE:     Vital Signs  (Most Recent)  Temp: 99.7 °F (37.6 °C) (08/28/22 0737)  Pulse: 73 (08/28/22 0737)  Resp: 18 (08/28/22 0737)  BP: 124/76 (08/28/22 0737)  SpO2: 96 % (08/28/22 0737)    Physical Exam:  General: well developed, ill-appearing white male in NAD.  HEENT: normocephalic, atraumatic, conjunctivae/corneas clear.  OP clear, no lesions or petechiae.  Lungs:  Decreased breath sounds and rhonchi left lower chest.  Right lung clear.  Heart: regular rate and rhythm, no murmur.  Abdomen: soft, non-tender non-distented; bowel sounds normal; no palpable masses or organomegaly  Extremities: no LE edema.  Skin: Warm, intact. No rashes or lesions.  Scattered ecchymoses and petechiae on the extremities.  +hematoma involving right forearm.  Neurologic: Alert and oriented, no focal deficits, normal strength.  Gait normal.      Laboratory:  CBC with Differential:  Recent Labs   Lab 08/28/22 0417   WBC 13.3*   RBC 2.51*   HCT 22.1*   HGB 7.5*   MCV 88.0   MCH 29.9   RDW 14.3   PLT 12*   MPV 11.2*     CMP:  Recent Labs   Lab 08/28/22 0417   CALCIUM 8.1*   ALBUMIN 2.9*   *   K 4.0   CO2 24   BUN 15.6   CREATININE 0.74   ALKPHOS 71   ALT 77*   AST 28   BILITOT 1.6*     BMP:   Recent Labs   Lab 08/28/22 0417   CALCIUM 8.1*   *   K 4.0   CO2 24   BUN 15.6   CREATININE 0.74     LFTs:   Recent Labs   Lab 08/28/22 0417   ALT 77*   AST 28   ALKPHOS 71   BILITOT 1.6*   ALBUMIN 2.9*       Diagnostic Results:  CT images reviewed.      ASSESSMENT/PLAN:   ASSESSMENT  Refractory ITP  Recurrent CLL   LLL pneumonia  Persistent fever  Anemia       PLAN  Day 7 IV Azithromycin.  Day 3 Merrpenem  Cefepime was d/c yesterday (had total 5 days)  Persistent fever.  No new culture results.  He remains transfusion dependent for PRBCs and platelets.    On Promacta 100 mg-Not responding to Promacta as he did previously  Will hold platelet transfusion for now  CBC around 3:00 PM and if < 12k then he will receive platelet transfusion. t  For any evidence of  bleeding he will be transfuse with platelets  Will continue to monitor counts daily.   Bone marrow aspirate and biopsy planned for Monday 8/29/22.      Manju Ellington MD  Hematology/Oncology

## 2022-08-29 NOTE — PROGRESS NOTES
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    Treatment History  Fludarabine/Rituxan x 5 cycles (1/15)  WinRho 5/18  Rituxan x4 (6/18)  Promacta 8/18-present    He was initially treated with 5 cycles of FR CLL with a CR.  Cycle 6 was held due to cumulative cytopenias despite dose reduction.  His counts recovered gradually.  He developed acute ITP 5/18 without evidence of recurrence of his CLL at that time.  He was refractory to treatment with prednisone, WinRho and Rituxan.  He responded to treatment with Promacta and was continued on treatment.  He developed recurrence of his CLL and was started on Acalabrutinib 4/25/22. Platelet count at that time was 140,000 and Promacta was held.  Treatment was well tolerated.      07/28/22: Plts 30,000. Resumed Promacta 50 mg/d.  08/08/22: Plts 1,000. Prednisone 40 mg/d.  08/10/22: Plts 1,000. IVIG 1 gm/kg x 2 days.  08/15/22: Plts 4,000. D/C Acalabrutinib and increased Promacta to 100 mg/d.  08/18/22: Plts 2,000. Transfuse 1 unit platelets on 8/19/22.    Patient fell at home on the evening of 8/18/22 landing on his side.  He did not lose consciousness, he denies hitting his head.  He did have a headache.  Has a hematoma involving the right forearm.  CBC showed progressive leukocytosis and anemia.  Hospital admission was recommended for additional workup and treatment.  He was alert and oriented.  He had no focal neurologic deficits.  He denied any fever or chills.  CT of the head without contrast showed a left parietal lobe heterogeneous hyperdensity - could not rule out partially calcified meningioma or small arachnoid hemorrhage.  Case was discussed with Neurosurgery. MRI brain 08/19/2022 revealed a 13 mm dural-based enhancing mass along the left parietal convexity most suggestive of meningioma with a trace subdural and subarachnoid hemorrhage along the right cerebral  convexity.      He was observed in the ICU with no progressive neurologic symptoms. He spiked a fever on 8/21/22 and blood cultures were obtained.  He was continued on IV Cefepime.  He received PRBC and platelet transfusions for symptomatic anemia and ongoing thrombocytopenia.  He continued to have fever with negative blood cultures.  Chest x-ray 08/22/22 showed a possible LLL infiltrate.  CT of the chest showed a dense LLL consolidation and additional patchy ground-glass opacities.  Azithromycin was added.  Respiratory PCR panel was negative.  MRSA PCR negative.  Repeat COVID PCR negative.     He continued to spike fevers.  CT C/A/P 8/25/22 showed an unchanged dense LLL consolidation with slight improvement of multifocal ground-glass opacities.  There was prominent paratracheal, retroperitoneal and mesenteric adenopathy and mild splenomegaly.  There is a large left renal cyst with no evidence of hydronephrosis.  He has continued to require PRBC and platelet transfusions.    Today (8/29/22):  T-max 103.1° yesterday.  Fever did not come down with Tylenol or Advil.  He was given Decadron with improvement in his temperature. He feels better this morning, currently afebrile. He is NPO for a bone marrow aspirate and biopsy.  Hemoglobin 7.5, platelets [id number].      SUBJECTIVE:     Continuous Infusions:  Scheduled Meds:   azithromycin (ZITHROMAX) 250 mg IVPB  250 mg Intravenous Q24H    eltrombopag  100 mg Oral Daily    famotidine  20 mg Oral BID    guaiFENesin  600 mg Oral BID    meropenem (MERREM) IVPB  1 g Intravenous Q8H    multivitamin  1 tablet Oral Daily     PRN Meds:    Review of patient's allergies indicates:   Allergen Reactions    Sulfa (sulfonamide antibiotics)      Other reaction(s): VOMITING    Sulfamethoxazole-trimethoprim Nausea Only and Nausea And Vomiting     Other reaction(s): Diaphoresis.        Review of Systems:  Constitutional: + fever, no chills, + generalized weakness, + fatigue.    Eye:  No icterus,  No blurring, No double vision, No visual disturbances.    Ear/Nose/Mouth/Throat:  No decreased hearing, No nasal congestion, No sore throat.  No gum bleeding, no epistaxis.  Respiratory:  No shortness of breath, Occ cough, minimal sputum production, No hemoptysis, No wheezing.    Cardiovascular:  No chest pain, No palpitations, No tachycardia.    Gastrointestinal:  No nausea, No vomiting, No diarrhea, mild constipation, No abdominal pain.    Genitourinary:  No dysuria, Mild hematuria, No change in urine stream.    Hematology/Lymphatics:  + bruising tendency, + bleeding tendency, No swollen lymph glands.    Musculoskeletal:  No joint pain or back pain. No LE edema. R arm hematoma.  Integumentary:  No rash, No pruritus, + petechiae.    Neurologic:  Alert and oriented X4, No abnormal balance, No headache.    PMHx:   CLL, ITP, HTN  PSHx: Appendectomy, Laparoscopic Cholecystectomy, R arm skin graft, Bone marrow biopsy, Umbilical hernia repair, Colonoscopy 7/17  SH: Lifetime nonsmoker, + Social alcohol use, Lives in [place] with his wife  FH: Father had prostate cancer, mother had renal cell carcinoma       OBJECTIVE:     Vital Signs (Most Recent)  Temp: 97.5 °F (36.4 °C) (08/29/22 0300)  Pulse: 64 (08/29/22 0300)  Resp: 18 (08/29/22 0300)  BP: 117/73 (08/29/22 0300)  SpO2: (!) 94 % (08/29/22 0300)    Physical Exam:  General: well developed, ill-appearing white male in NAD.  HEENT: normocephalic, atraumatic, conjunctivae/corneas clear.  OP clear, no lesions or petechiae.  Lungs:  Decreased breath sounds and rhonchi left lower chest.  Right lung clear.  Heart: regular rate and rhythm, no murmur.  Abdomen: soft, non-tender non-distented; bowel sounds normal; no palpable masses or organomegaly  Extremities: no LE edema.  Skin: Warm, intact. No rashes or lesions.  Scattered ecchymoses and petechiae on the extremities.  +hematoma involving right forearm.  Neurologic: Alert and oriented, no focal deficits, normal strength.   Gait normal.      Laboratory:  CBC with Differential:  Recent Labs   Lab 08/29/22  0341   WBC 9.9   RBC 2.49*   HCT 22.1*   HGB 7.5*   MCV 88.8   MCH 30.1   RDW 14.3   PLT 17*   MPV 10.6*     CMP:  Recent Labs   Lab 08/29/22  0341   CALCIUM 8.4*   ALBUMIN 2.9*      K 4.4   CO2 25   BUN 15.9   CREATININE 0.68*   ALKPHOS 77   ALT 72*   AST 20   BILITOT 0.9     BMP:   Recent Labs   Lab 08/29/22  0341   CALCIUM 8.4*      K 4.4   CO2 25   BUN 15.9   CREATININE 0.68*     LFTs:   Recent Labs   Lab 08/29/22  0341   ALT 72*   AST 20   ALKPHOS 77   BILITOT 0.9   ALBUMIN 2.9*       Diagnostic Results:  No new imaging for review.      ASSESSMENT/PLAN:   ASSESSMENT  Refractory ITP  Recurrent CLL   LLL pneumonia  Persistent fever  Anemia       PLAN  Day 4 Meropenem. D/C Azithromycin.  Persistent fever with negative cultures.  Start itraconazole 200 mg b.i.d.  He remains transfusion dependent for PRBCs and platelets.    No evidence of response to Promacta.  Bone marrow aspirate and biopsy today.      DUKE LYNCH MD

## 2022-08-30 NOTE — PROGRESS NOTES
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    Treatment History  Fludarabine/Rituxan x 5 cycles (1/15)  WinRho 5/18  Rituxan x4 (6/18)  Promacta 8/18-present    He was initially treated with 5 cycles of FR CLL with a CR.  Cycle 6 was held due to cumulative cytopenias despite dose reduction.  His counts recovered gradually.  He developed acute ITP 5/18 without evidence of recurrence of his CLL at that time.  He was refractory to treatment with prednisone, WinRho and Rituxan.  He responded to treatment with Promacta and was continued on treatment.  He developed recurrence of his CLL and was started on Acalabrutinib 4/25/22. Platelet count at that time was 140,000 and Promacta was held.  Treatment was well tolerated.      07/28/22: Plts 30,000. Resumed Promacta 50 mg/d.  08/08/22: Plts 1,000. Prednisone 40 mg/d.  08/10/22: Plts 1,000. IVIG 1 gm/kg x 2 days.  08/15/22: Plts 4,000. D/C Acalabrutinib and increased Promacta to 100 mg/d.  08/18/22: Plts 2,000. Transfuse 1 unit platelets on 8/19/22.    Patient fell at home on the evening of 8/18/22 landing on his side.  He did not lose consciousness, he denies hitting his head.  He did have a headache.  Has a hematoma involving the right forearm.  CBC showed progressive leukocytosis and anemia.  Hospital admission was recommended for additional workup and treatment.  He was alert and oriented.  He had no focal neurologic deficits.  He denied any fever or chills.  CT of the head without contrast showed a left parietal lobe heterogeneous hyperdensity - could not rule out partially calcified meningioma or small arachnoid hemorrhage.  Case was discussed with Neurosurgery. MRI brain 08/19/2022 revealed a 13 mm dural-based enhancing mass along the left parietal convexity most suggestive of meningioma with a trace subdural and subarachnoid hemorrhage along the right cerebral  convexity.      He was observed in the ICU with no progressive neurologic symptoms. He spiked a fever on 8/21/22 and blood cultures were obtained.  He was continued on IV Cefepime.  He received PRBC and platelet transfusions for symptomatic anemia and ongoing thrombocytopenia.  He continued to have fever with negative blood cultures.  Chest x-ray 08/22/22 showed a possible LLL infiltrate.  CT of the chest showed a dense LLL consolidation and additional patchy ground-glass opacities.  Azithromycin was added.  Respiratory PCR panel was negative.  MRSA PCR negative.  Repeat COVID PCR negative.     He continued to spike fevers.  CT C/A/P 8/25/22 showed an unchanged dense LLL consolidation with slight improvement of multifocal ground-glass opacities.  There was prominent paratracheal, retroperitoneal and mesenteric adenopathy and mild splenomegaly.  There is a large left renal cyst with no evidence of hydronephrosis.  He has continued to require PRBC and platelet transfusions. A bone marrow aspirate and biopsy with cultures was done on 8/29/22.    Today (8/30/22):  Awake and alert this morning, his wife is at bedside.  He is still spiking fevers up to 103° F.  All cultures to date remain negative.  Bone marrow done yesterday including bacterial, fungal and AFB cultures.  Azithromycin was discontinued and itraconazole was added.  His fevers have not been responsive to Tylenol or NSAIDs.  Trying to limit NSAIDs secondary to his thrombocytopenia.  He has required IV Decadron to break his high fever.      SUBJECTIVE:     Continuous Infusions:   sodium chloride 0.9% Stopped (08/29/22 1149)     Scheduled Meds:   eltrombopag  100 mg Oral Daily    famotidine  20 mg Oral BID    flumazeniL  0.2 mg Intravenous Once    guaiFENesin  600 mg Oral BID    itraconazole  200 mg Oral BID    LIDOcaine (PF) 10 mg/ml (1%)  1 mL Other Once    meropenem (MERREM) IVPB  1 g Intravenous Q8H    multivitamin  1 tablet Oral Daily     PRN  Meds:    Review of patient's allergies indicates:   Allergen Reactions    Sulfa (sulfonamide antibiotics)      Other reaction(s): VOMITING    Sulfamethoxazole-trimethoprim Nausea Only and Nausea And Vomiting     Other reaction(s): Diaphoresis.        Review of Systems:  Constitutional: + fever, no chills, + generalized weakness, + fatigue.    Eye:  No icterus, No blurring, No double vision, No visual disturbances.    Ear/Nose/Mouth/Throat:  No decreased hearing, No nasal congestion, No sore throat.  No gum bleeding, no epistaxis.  Respiratory:  No shortness of breath, Occ cough, minimal sputum production, No hemoptysis, No wheezing.    Cardiovascular:  No chest pain, No palpitations, No tachycardia.    Gastrointestinal:  No nausea, No vomiting, No diarrhea, mild constipation, No abdominal pain.    Genitourinary:  No dysuria, Mild hematuria, No change in urine stream.    Hematology/Lymphatics:  + bruising tendency, + bleeding tendency, No swollen lymph glands.    Musculoskeletal:  No joint pain or back pain. No LE edema. R arm hematoma.  Integumentary:  No rash, No pruritus, + petechiae.    Neurologic:  Alert and oriented X4, No abnormal balance, No headache.    PMHx:   CLL, ITP, HTN  PSHx: Appendectomy, Laparoscopic Cholecystectomy, R arm skin graft, Bone marrow biopsy, Umbilical hernia repair, Colonoscopy 7/17  SH: Lifetime nonsmoker, + Social alcohol use, Lives in [place] with his wife  FH: Father had prostate cancer, mother had renal cell carcinoma       OBJECTIVE:     Vital Signs (Most Recent)  Temp: 99.4 °F (37.4 °C) (08/30/22 0428)  Pulse: 93 (08/30/22 0428)  Resp: 18 (08/30/22 0009)  BP: 119/71 (08/30/22 0428)  SpO2: (!) 94 % (08/30/22 0428)    Physical Exam:  General: well developed, ill-appearing white male in NAD.  HEENT: normocephalic, atraumatic, conjunctivae/corneas clear.  OP clear, no lesions or petechiae.  Lungs:  Decreased breath sounds and rhonchi left lower chest.  Right lung clear.  Heart:  regular rate and rhythm, no murmur.  Abdomen: soft, non-tender non-distented; bowel sounds normal; no palpable masses or organomegaly  Extremities: no LE edema.  Skin: Warm, intact. No rashes or lesions.  Scattered ecchymoses and petechiae on the extremities.  Evolving right forearm hematoma.  Neurologic: Alert and oriented, no focal deficits, normal strength.  Gait normal.      Laboratory:  CBC with Differential:  Recent Labs   Lab 08/30/22  0347   WBC 14.3*   RBC 2.33*   HCT 20.6*   HGB 7.1*   MCV 88.4   MCH 30.5   RDW 14.3   PLT 12*   MPV 10.7*     CMP:  No results for input(s): GLU, CALCIUM, ALBUMIN, PROT, NA, K, CO2, CL, BUN, CREATININE, ALKPHOS, ALT, AST, BILITOT in the last 24 hours.    BMP:   No results for input(s): GLU, CALCIUM, NA, K, CO2, CL, BUN, CREATININE in the last 24 hours.    LFTs:   No results for input(s): ALT, AST, ALKPHOS, BILITOT, PROT, ALBUMIN in the last 24 hours.      Diagnostic Results:  No new imaging for review.      ASSESSMENT/PLAN:   ASSESSMENT  Refractory ITP  Recurrent CLL   LLL pneumonia  Persistent fever  Anemia       PLAN  Day 5 Meropenem. Day 2 Itraconazole.  Ongoing fevers more suggestive of a lymphoproliferative process rather than an infectious process.  Have discussed management extensively with ID.  Await bone marrow results to help guide treatment decisions.  Restart low-dose prednisone for fever suppression.   Portable CXR today.        DUKE LYNCH MD

## 2022-08-30 NOTE — PROGRESS NOTES
SUBJECTIVE:  Clinically unchanged.  No new complaints.  Continues with fevers.        REVIEW OF SYSTEMS: Negative unless stated above         MEDICATIONS:   Reviewed in EMR        PHYSICAL EXAM:   Vitals:    08/30/22 1159   BP: 115/70   Pulse: 73   Resp: 18   Temp: 98.4 °F (36.9 °C)     GENERAL: NAD; does not appear toxic  SKIN: no rash  HEENT: sclera non-icteric; PERRL  NECK: supple; no LAD  CHEST: CTA; nonlabored, equal expansion   CARDIOVASCULAR: RRR, S1S2; no murmur; strong, equal peripheral pulses; no edema  ABDOMEN:  active bowel sounds; abdomen soft, nondistended, nontender to palpation  EXTREMITIES: no cyanosis or clubbing  NEURO: AAO x4; CN II-XII grossly intact  PSYCH: Mentation and affect appropriate          LABORATORY DATA: Reviewed         RADIOLOGICAL DATA: Reviewed       IMPRESSION:   He is a 67-year-old male with a past medical history of CLL and refractory ITP who presented after falling at home.  He was found to possibly have a small intracranial hemorrhage which resolved without intervention.  He then began with fevers, and imaging revealed a left lower lobe pneumonia.  Id consulted for input given patient's immunocompromised status.    Left lower lobe pneumonia  Sepsis s/t above  Possible ICH s/t fall at home, stable without intervention  H/o CLL and refractory ITP on chronic prednisone and Promacta       PLAN:  F/u bone marrow cultures and pathology as well as pending Fungitell and serum Aspergillus Ag.  Continue empiric meropenem and itraconazole.  May need to pursue LN biopsy if work-up remains otherwise negative.   Discussed with patient.

## 2022-08-30 NOTE — PROGRESS NOTES
SUBJECTIVE:  Remains with intermittent fevers.  Otherwise, no complaints.  S/p bone marrow biopsy earlier today.        REVIEW OF SYSTEMS: Negative unless stated above         MEDICATIONS:   Reviewed in EMR        PHYSICAL EXAM:   VS: T-max 103, HR 80     GENERAL: NAD; does not appear toxic  SKIN: no rash  HEENT: sclera non-icteric; PERRL  NECK: supple; no LAD  CHEST: CTA; nonlabored, equal expansion   CARDIOVASCULAR: RRR, S1S2; no murmur; strong, equal peripheral pulses; no edema  ABDOMEN:  active bowel sounds; abdomen soft, nondistended, nontender to palpation  EXTREMITIES: no cyanosis or clubbing  NEURO: AAO x4; CN II-XII grossly intact  PSYCH: Mentation and affect appropriate          LABORATORY DATA: Reviewed         RADIOLOGICAL DATA: Reviewed       IMPRESSION:   He is a 67-year-old male with a past medical history of CLL and refractory ITP who presented after falling at home.  He was found to possibly have a small intracranial hemorrhage which resolved without intervention.  He then began with fevers, and imaging revealed a left lower lobe pneumonia.  Id consulted for input given patient's immunocompromised status.    Left lower lobe pneumonia  Sepsis s/t above  Possible ICH s/t fall at home, stable without intervention  H/o CLL and refractory ITP on chronic prednisone and Promacta       PLAN:  S/p bone marrow biopsy - f/u cultures and pathology.  F/u Fungitell and serum Aspergillus Ag.  DC azithromycin and continue meropenem.  Start empiric antifungal with itraconazole.  May need to pursue LN biopsy if work-up remains otherwise negative.   Discussed with patient, wife, and nursing.

## 2022-08-30 NOTE — PHYSICIAN QUERY
PT Name: Ronny Cloud  MR #: [id number]     DOCUMENTATION CLARIFICATION     CDS: Holly Dang RN, CCDS   Contact Information: [email or web address]    This form is a permanent document in the medical record.     Query Date: August 30, 2022    By submitting this query, we are merely seeking further clarification of documentation.  Please utilize your independent clinical judgment when addressing the question(s) below.  The Medical Record contains the following:  Indicators Supporting Clinical Findings Location in Medical Record   x HR         RR          BP        Temp 8/19 1038: T 99.3, HR 77, R 20, SpO2 97%, /72    8/21 2051/2100: T 101.7, HR 93, R 29, SpO2 83%, /76 Flowsheets    Lactic Acid          Procalcitonin     x WBC           Bands      CRP     08/19/22 10:58 08/20/22 02:47   WBC 42.4 (H) 21.0 (H)      Labs   x Culture(s) 8/19 Blood Culture: No growth at 5 days  8/22 Blood Culture: No growth at 5 days  8/23 Blood Culture: No growth at 5 days  8/23 Respiratory Culture: Normal Respiratory Deedee  8/25 Respiratory Culture: Normal Respiratory Deedee   Microbiology    AMS, Confusion, LOC, etc.      Organ Dysfunction/ Failure     x Bacteremia or Sepsis / Septic Sepsis s/t above (left lower lobe pneumonia) 8/23-8/26 ID, FNP Alexi    x Known or Suspected Source of Infection documented Noted to have LLL pneumonia with ongoing fevers for which ID is consulted.  8/23 ID, FNP Alexi/ Dr. Mcpherson    (Failed) Outpatient Treatment     x Medication Cefepime 1g IV Q8H  Tylenol 650mg PO Q4H PRN  Azithromycin 250mg IV Q24H  Cefepime 2g IV Q 8H  Merrem 1g IV Q8H  Dexamethasone 8mg IVP x1  Dexamethasone 8mg IVP x1  Itraconazole 200mg PO BID 8/19-8/20 MAR  8/19-Present MAR  8/22-8/29 MAR  8/22-8/26 MAR  8/26-Present MAR  8/28 MAR  8/29 MAR  8/29-Present MAR    Treatment     x Other 68 y/o male with history of lymphoma, HTN, and ITP presenting to the ED after a fall that took place around 0000.  Patient's wife reports patient fell on carpet, hit the wall, and loss consciousness for a few seconds...subdural hematoma 8/19 ED, Dr. Barth        Please clarify/confirm the Consultants diagnosis of Sepsis:     [  ] Diagnosis ruled in   [ X ] Diagnosis ruled out   [  ] Other diagnosis (please specify): __________________________   [  ] Clinically undetermined       Please document in your progress notes daily for the duration of treatment until resolved and include in your discharge summary.

## 2022-08-30 NOTE — PLAN OF CARE
Problem: Adult Inpatient Plan of Care  Goal: Plan of Care Review  Outcome: Ongoing, Progressing  Flowsheets (Taken 8/29/2022 2201)  Plan of Care Reviewed With:   patient   spouse  Goal: Patient-Specific Goal (Individualized)  Outcome: Ongoing, Progressing  Goal: Absence of Hospital-Acquired Illness or Injury  Outcome: Ongoing, Progressing  Intervention: Identify and Manage Fall Risk  Flowsheets (Taken 8/29/2022 2201)  Safety Promotion/Fall Prevention:   instructed to call staff for mobility   nonskid shoes/socks when out of bed   assistive device/personal item within reach   Fall Risk reviewed with patient/family  Intervention: Prevent Infection  Flowsheets (Taken 8/29/2022 2201)  Infection Prevention:   environmental surveillance performed   single patient room provided   hand hygiene promoted   equipment surfaces disinfected   personal protective equipment utilized   rest/sleep promoted  Goal: Optimal Comfort and Wellbeing  Outcome: Ongoing, Progressing  Intervention: Monitor Pain and Promote Comfort  Flowsheets (Taken 8/29/2022 2201)  Pain Management Interventions:   relaxation techniques promoted   premedicated for activity   pain management plan reviewed with patient/caregiver  Intervention: Provide Person-Centered Care  Flowsheets (Taken 8/29/2022 2201)  Trust Relationship/Rapport:   care explained   choices provided   emotional support provided   empathic listening provided   questions answered   questions encouraged   thoughts/feelings acknowledged   reassurance provided  Goal: Readiness for Transition of Care  Outcome: Ongoing, Progressing     Problem: Adjustment to Illness (Stroke, Hemorrhagic)  Goal: Optimal Coping  Outcome: Ongoing, Progressing     Problem: Bowel Elimination Impaired (Stroke, Hemorrhagic)  Goal: Effective Bowel Elimination  Outcome: Ongoing, Progressing     Problem: Cerebral Tissue Perfusion (Stroke, Hemorrhagic)  Goal: Optimal Cerebral Tissue Perfusion  Outcome: Ongoing, Progressing      Problem: Cognitive Impairment (Stroke, Hemorrhagic)  Goal: Optimal Cognitive Function  Outcome: Ongoing, Progressing     Problem: Communication Impairment (Stroke, Hemorrhagic)  Goal: Effective Communication Skills  Outcome: Ongoing, Progressing     Problem: Functional Ability Impaired (Stroke, Hemorrhagic)  Goal: Optimal Functional Ability  Outcome: Ongoing, Progressing     Problem: Pain (Stroke, Hemorrhagic)  Goal: Acceptable Pain Control  Outcome: Ongoing, Progressing     Problem: Respiratory Compromise (Stroke, Hemorrhagic)  Goal: Effective Oxygenation and Ventilation  Outcome: Ongoing, Progressing     Problem: Sensorimotor Impairment (Stroke, Hemorrhagic)  Goal: Improved Sensorimotor Function  Outcome: Ongoing, Progressing     Problem: Swallowing Impairment (Stroke, Hemorrhagic)  Goal: Optimal Eating and Swallowing Without Aspiration  Outcome: Ongoing, Progressing     Problem: Urinary Elimination Impaired (Stroke, Hemorrhagic)  Goal: Effective Urinary Elimination  Outcome: Ongoing, Progressing  Intervention: Promote Effective Bladder Elimination  Flowsheets (Taken 8/29/2022 2201)  Urinary Elimination Promotion: toileting device within reach     Problem: Fall Injury Risk  Goal: Absence of Fall and Fall-Related Injury  Outcome: Ongoing, Progressing  Intervention: Identify and Manage Contributors  Flowsheets (Taken 8/29/2022 2201)  Medication Review/Management: medications reviewed  Intervention: Promote Injury-Free Environment  Flowsheets (Taken 8/29/2022 2201)  Safety Promotion/Fall Prevention:   instructed to call staff for mobility   nonskid shoes/socks when out of bed   assistive device/personal item within reach   Fall Risk reviewed with patient/family     Problem: Fluid Imbalance (Pneumonia)  Goal: Fluid Balance  Outcome: Ongoing, Progressing     Problem: Infection (Pneumonia)  Goal: Resolution of Infection Signs and Symptoms  Outcome: Ongoing, Progressing     Problem: Respiratory Compromise  (Pneumonia)  Goal: Effective Oxygenation and Ventilation  Outcome: Ongoing, Progressing  Intervention: Optimize Oxygenation and Ventilation  Flowsheets (Taken 8/29/2022 2201)  Airway/Ventilation Management: airway patency maintained  Head of Bed (HOB) Positioning: HOB at 30 degrees     Problem: Fever  Goal: Body Temperature in Desired Range  Outcome: Ongoing, Progressing  Intervention: Promote Normothermia  Flowsheets (Taken 8/29/2022 2201)  Fever Reduction/Comfort Measures:   aerosol temperature decreased   cool cloth applied   fluid intake increased   ice pack(s) applied   lightweight bedding   lightweight clothing

## 2022-08-30 NOTE — PROCEDURES
"Ronny Cloud is a 67 y.o. male patient.    Temp: 99.1 °F (37.3 °C) (08/30/22 0816)  Pulse: 75 (08/30/22 0816)  Resp: 18 (08/30/22 0816)  BP: (!) 143/83 (08/30/22 0816)  SpO2: 96 % (08/30/22 0816)  Weight: 120.3 kg (265 lb 3.4 oz) (08/28/22 0300)  Height: 6' 0.05" (183 cm) (08/19/22 2147)  Mallampati Scale: Class II  ASA Classification: Class 2    Bone marrow    Date/Time: 8/30/2022 11:37 AM  Performed by: Claude Rand MD  Authorized by: Claude Rand MD     Consent Done?: Yes (Verbal) and Yes (Written)   Immediately prior to procedure a time out was called to verify the correct patient, procedure, equipment, support staff and site/side marked as required. .      Position: left lateral  Anesthesia: local infiltration  Local anesthetic: lidocaine 1% without epinephrine  Aspiration?: Yes   Biopsy?: Yes    Specimen source: posterior iliac crest  Patient tolerated the procedure well with no immediate complications.  Post-operative instructions were provided for the patient.  Patient was discharged and will follow up if any complications occur.     8/30/2022    "

## 2022-08-31 NOTE — PROGRESS NOTES
SUBJECTIVE:  Continues with fevers up to 103.  Otherwise, no new issues.         REVIEW OF SYSTEMS: Negative unless stated above         MEDICATIONS:   Reviewed in EMR        PHYSICAL EXAM:   Vitals:    08/31/22 0955   BP: 131/65   Pulse: 91   Resp:    Temp: (!) 102.3 °F (39.1 °C)     GENERAL: NAD; does not appear toxic  SKIN: no rash  HEENT: sclera non-icteric; PERRL  NECK: supple; no LAD  CHEST: CTA; nonlabored, equal expansion   CARDIOVASCULAR: RRR, S1S2; no murmur; strong, equal peripheral pulses; no edema  ABDOMEN:  active bowel sounds; abdomen soft, nondistended, nontender to palpation  EXTREMITIES: no cyanosis or clubbing  NEURO: AAO x4; CN II-XII grossly intact  PSYCH: Mentation and affect appropriate          LABORATORY DATA: Reviewed         RADIOLOGICAL DATA: Reviewed       IMPRESSION:   He is a 67-year-old male with a past medical history of CLL and refractory ITP who presented after falling at home.  He was found to possibly have a small intracranial hemorrhage which resolved without intervention.  He then began with fevers, and imaging revealed a left lower lobe pneumonia.  Id consulted for input given patient's immunocompromised status.    Persistent fever  Left lower lobe pneumonia, resolving  Possible ICH s/t fall at home, stable without intervention  H/o CLL and refractory ITP on chronic prednisone and Promacta       PLAN:  Cultures negative thus far.   F/u bone marrow pathology, Fungitell, and serum Aspergillus Ag.  Continue empiric meropenem and itraconazole.  Discussed with patient and wife.

## 2022-08-31 NOTE — PROGRESS NOTES
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    Treatment History  Fludarabine/Rituxan x 5 cycles (1/15)  WinRho 5/18  Rituxan x4 (6/18)  Promacta 8/18-present    He was initially treated with 5 cycles of FR CLL with a CR.  Cycle 6 was held due to cumulative cytopenias despite dose reduction.  His counts recovered gradually.  He developed acute ITP 5/18 without evidence of recurrence of his CLL at that time.  He was refractory to treatment with prednisone, WinRho and Rituxan.  He responded to treatment with Promacta and was continued on treatment.  He developed recurrence of his CLL and was started on Acalabrutinib 4/25/22. Platelet count at that time was 140,000 and Promacta was held.  Treatment was well tolerated.      07/28/22: Plts 30,000. Resumed Promacta 50 mg/d.  08/08/22: Plts 1,000. Prednisone 40 mg/d.  08/10/22: Plts 1,000. IVIG 1 gm/kg x 2 days.  08/15/22: Plts 4,000. D/C Acalabrutinib and increased Promacta to 100 mg/d.  08/18/22: Plts 2,000. Transfuse 1 unit platelets on 8/19/22.    Patient fell at home on the evening of 8/18/22 landing on his side.  He did not lose consciousness, he denies hitting his head.  He did have a headache.  Has a hematoma involving the right forearm.  CBC showed progressive leukocytosis and anemia.  Hospital admission was recommended for additional workup and treatment.  He was alert and oriented.  He had no focal neurologic deficits.  He denied any fever or chills.  CT of the head without contrast showed a left parietal lobe heterogeneous hyperdensity - could not rule out partially calcified meningioma or small arachnoid hemorrhage.  Case was discussed with Neurosurgery. MRI brain 08/19/2022 revealed a 13 mm dural-based enhancing mass along the left parietal convexity most suggestive of meningioma with a trace subdural and subarachnoid hemorrhage along the right cerebral  convexity.      He was observed in the ICU with no progressive neurologic symptoms. He spiked a fever on 8/21/22 and blood cultures were obtained.  He was continued on IV Cefepime.  He received PRBC and platelet transfusions for symptomatic anemia and ongoing thrombocytopenia.  He continued to have fever with negative blood cultures.  Chest x-ray 08/22/22 showed a possible LLL infiltrate.  CT of the chest showed a dense LLL consolidation and additional patchy ground-glass opacities.  Azithromycin was added.  Respiratory PCR panel was negative.  MRSA PCR negative.  Repeat COVID PCR negative.     He continued to spike fevers.  CT C/A/P 8/25/22 showed an unchanged dense LLL consolidation with slight improvement of multifocal ground-glass opacities.  There was prominent paratracheal, retroperitoneal and mesenteric adenopathy and mild splenomegaly.  There was a large left renal cyst with no evidence of hydronephrosis.  He continued to require PRBC and platelet transfusions. A bone marrow aspirate and biopsy with cultures was done on 8/29/22.  Azithromycin was discontinued and itraconazole was added.  His fevers were not responsive to Tylenol or NSAIDs.  He required IV Decadron to decrease his fever.  He was restarted on prednisone 20 mg daily 8/30/22.     Today (8/31/22):  He is currently afebrile and without complaints.  He continues to spike fevers up to 103° F. All cultures since admission have been negative.  Chest x-ray 8/30/22 showed improved left lower lung consolidation.  He remains transfusion dependent for PRBCs and platelets.  Bone marrow results pending.      SUBJECTIVE:     Continuous Infusions:   sodium chloride 0.9% Stopped (08/29/22 1149)     Scheduled Meds:   eltrombopag  100 mg Oral Daily    famotidine  20 mg Oral BID    flumazeniL  0.2 mg Intravenous Once    guaiFENesin  600 mg Oral BID    itraconazole  200 mg Oral BID    LIDOcaine (PF) 10 mg/ml (1%)  1 mL Other Once    meropenem (MERREM) IVPB  1 g  Intravenous Q8H    multivitamin  1 tablet Oral Daily    predniSONE  20 mg Oral Daily     PRN Meds:    Review of patient's allergies indicates:   Allergen Reactions    Sulfa (sulfonamide antibiotics)      Other reaction(s): VOMITING    Sulfamethoxazole-trimethoprim Nausea Only and Nausea And Vomiting     Other reaction(s): Diaphoresis.        Review of Systems:  Constitutional: + fever, no chills, + generalized weakness, + fatigue.    Eye:  No icterus, No blurring, No double vision, No visual disturbances.    Ear/Nose/Mouth/Throat:  No decreased hearing, No nasal congestion, No sore throat.  No gum bleeding, no epistaxis.  Respiratory:  No shortness of breath, Occ cough, minimal sputum production, No hemoptysis, No wheezing.    Cardiovascular:  No chest pain, No palpitations, No tachycardia.    Gastrointestinal:  No nausea, No vomiting, No diarrhea, mild constipation, No abdominal pain.    Genitourinary:  No dysuria, Mild hematuria, No change in urine stream.    Hematology/Lymphatics:  + bruising tendency, + bleeding tendency, No swollen lymph glands.    Musculoskeletal:  No joint pain or back pain. No LE edema. R arm hematoma.  Integumentary:  No rash, No pruritus, + petechiae.    Neurologic:  Alert and oriented X4, No abnormal balance, No headache.    PMHx:   CLL, ITP, HTN  PSHx: Appendectomy, Laparoscopic Cholecystectomy, R arm skin graft, Bone marrow biopsy, Umbilical hernia repair, Colonoscopy 7/17  SH: Lifetime nonsmoker, + Social alcohol use, Lives in [place] with his wife  FH: Father had prostate cancer, mother had renal cell carcinoma       OBJECTIVE:     Vital Signs (Most Recent)  Temp: 99.6 °F (37.6 °C) (08/31/22 0750)  Pulse: 86 (08/31/22 0750)  Resp: 18 (08/31/22 0750)  BP: 106/73 (08/31/22 0750)  SpO2: (!) 93 % (08/31/22 0750)    Physical Exam:  General: well developed, ill-appearing white male in NAD.  HEENT: normocephalic, atraumatic, conjunctivae/corneas clear.  OP clear, no lesions or  petechiae.  Lungs:  Faint crackles left lower chest.  Right lung clear.  Heart: regular rate and rhythm, no murmur.  Abdomen: soft, non-tender non-distented; bowel sounds normal; no palpable masses or organomegaly  Extremities: no LE edema.  Skin: Warm, intact. No rashes or lesions.  Scattered ecchymoses and petechiae on the extremities.  Evolving right forearm hematoma.  Neurologic: Alert and oriented, no focal deficits, normal strength.  Gait normal.      Laboratory:  CBC with Differential:  WBC 26.9, hemoglobin 7.3, hematocrit 21.6, platelets 9000    No results for input(s): WBC, NEUTROPCT, LYMPH, MONOPCT, EOSPCT, BASOPHIL, RBC, HCT, HGB, MCV, MCH, RDW, PLT, MPV in the last 24 hours.    Invalid input(s): MCMC    CMP:  Recent Labs   Lab 08/31/22  0339   CALCIUM 8.1*   ALBUMIN 2.7*   *   K 4.4   CO2 24   BUN 17.1   CREATININE 0.72*   ALKPHOS 70   ALT 80*   AST 23   BILITOT 1.3       BMP:   Recent Labs   Lab 08/31/22  0339   CALCIUM 8.1*   *   K 4.4   CO2 24   BUN 17.1   CREATININE 0.72*       Diagnostic Results:  Chest x-ray images reviewed.      ASSESSMENT/PLAN:   ASSESSMENT  Refractory ITP  Recurrent CLL   LLL pneumonia  Persistent fever  Anemia       PLAN  Day 6 Meropenem. Day 3 Itraconazole.  Ongoing fevers more suggestive of a lymphoproliferative process rather than an infectious process.  Transfuse 1 unit of platelets today.  Await bone marrow results to help guide treatment decisions.  Increase prednisone to 40 mg daily for fever suppression.      DUKE LYNCH MD

## 2022-09-01 NOTE — PROGRESS NOTES
"Inpatient Nutrition Evaluation    Admit Date: 8/19/2022   Nutrition Recommendation/Prescription     - Continue regular diet as tolerated.   - Continue MVI as medially feasible.    Nutrition Assessment     Chart Review    Reason Seen: length of stay and follow-up    Diagnosis:  Refractory ITP  Recurrent CLL   LLL pneumonia  Persistent fever  Anemia     Relevant Medical History:   Chronic ITP, HTN, Leukemia    Nutrition-Related Medications: MVI, prednisone, Merrem  Nutrition-Related Labs:  8/24:  Glu 117, Ca 8.4, GFR >60  9/1: H/H-7.3/21.9, (8/31) Na-129, Crea-0.72, Gluc-153, Ca-8.1    Diet Order: Diet Adult Regular  Oral Supplement Order: none at this time  Appetite/Oral Intake: good/% of meals  Factors Affecting Nutritional Intake: none identified at this time  Food/Religion/Cultural Preferences: none reported    Skin Integrity: bruised (ecchymotic)  Wound(s):   none noted    Comments    8/25: Pt in bed with spouse at bedside. Seen for LOS - reports good appetite currently as well as prior to admit. +BMs. No significant wt loss noted.     9/1: Pt stated appetite has been good. No trouble with meals or intake.     Anthropometrics    Height: 6' 0.05" (183 cm)    Last Weight: 123.6 kg (272 lb 7.8 oz) (08/31/22 0542) Weight Method: Bed Scale  BMI (Calculated): 36.9  BMI Classification: obese grade II (BMI 35-39.9)     Ideal Body Weight (IBW), Male: 178.3 lb  % Ideal Body Weight, Male (lb): 147.67 %  UBW: Per pt ~ 266lb      Wt Readings from Last 5 Encounters:   08/31/22 123.6 kg (272 lb 7.8 oz)   08/12/22 120 kg (264 lb 8.8 oz)   08/11/22 120 kg (264 lb 8.8 oz)   08/10/22 120 kg (264 lb 8.8 oz)   07/28/22 120.7 kg (266 lb 3.2 oz)     Weight Change(s) Since Admission: -1lb  Admit Weight: 120.2 kg (265 lb) (08/19/22 1038)  9/1: 123.6kg wt on 8/31; +wt gain    Patient Education    Not applicable.    Monitoring & Evaluation     Dietitian will monitor food and beverage intake and weight change.  Nutrition " Risk/Follow-Up: low (follow-up in 5-7 days)  Patients assigned 'low nutrition risk' status do not qualify for a full nutritional assessment but will be monitored and re-evaluated in a 5-7 day time period.

## 2022-09-01 NOTE — PLAN OF CARE
Problem: Adult Inpatient Plan of Care  Goal: Plan of Care Review  Outcome: Ongoing, Progressing  Flowsheets (Taken 9/1/2022 0051)  Plan of Care Reviewed With: patient  Goal: Patient-Specific Goal (Individualized)  Outcome: Ongoing, Progressing  Goal: Absence of Hospital-Acquired Illness or Injury  Outcome: Ongoing, Progressing  Intervention: Identify and Manage Fall Risk  Flowsheets (Taken 9/1/2022 0051)  Safety Promotion/Fall Prevention:   assistive device/personal item within reach   side rails raised x 2   nonskid shoes/socks when out of bed   commode/urinal/bedpan at bedside  Intervention: Prevent Skin Injury  Flowsheets (Taken 9/1/2022 0051)  Body Position: position changed independently  Intervention: Prevent and Manage VTE (Venous Thromboembolism) Risk  Flowsheets (Taken 9/1/2022 0051)  VTE Prevention/Management:   bleeding risk assessed   bleeding precations maintained  Intervention: Prevent Infection  Flowsheets (Taken 9/1/2022 0051)  Infection Prevention:   hand hygiene promoted   rest/sleep promoted  Goal: Optimal Comfort and Wellbeing  Outcome: Ongoing, Progressing  Intervention: Monitor Pain and Promote Comfort  Flowsheets (Taken 9/1/2022 0051)  Pain Management Interventions:   care clustered   medication offered   pillow support provided  Intervention: Provide Person-Centered Care  Flowsheets (Taken 9/1/2022 0051)  Trust Relationship/Rapport: choices provided  Goal: Readiness for Transition of Care  Outcome: Ongoing, Progressing     Problem: Adjustment to Illness (Stroke, Hemorrhagic)  Goal: Optimal Coping  Outcome: Ongoing, Progressing  Intervention: Support Psychosocial Response to Stroke  Flowsheets (Taken 9/1/2022 0051)  Supportive Measures: self-care encouraged     Problem: Bowel Elimination Impaired (Stroke, Hemorrhagic)  Goal: Effective Bowel Elimination  Outcome: Ongoing, Progressing     Problem: Cerebral Tissue Perfusion (Stroke, Hemorrhagic)  Goal: Optimal Cerebral Tissue  Perfusion  Outcome: Ongoing, Progressing     Problem: Communication Impairment (Stroke, Hemorrhagic)  Goal: Effective Communication Skills  Outcome: Ongoing, Progressing     Problem: Functional Ability Impaired (Stroke, Hemorrhagic)  Goal: Optimal Functional Ability  Outcome: Ongoing, Progressing     Problem: Pain (Stroke, Hemorrhagic)  Goal: Acceptable Pain Control  Outcome: Ongoing, Progressing     Problem: Respiratory Compromise (Stroke, Hemorrhagic)  Goal: Effective Oxygenation and Ventilation  Outcome: Ongoing, Progressing     Problem: Sensorimotor Impairment (Stroke, Hemorrhagic)  Goal: Improved Sensorimotor Function  Outcome: Ongoing, Progressing     Problem: Swallowing Impairment (Stroke, Hemorrhagic)  Goal: Optimal Eating and Swallowing Without Aspiration  Outcome: Ongoing, Progressing     Problem: Urinary Elimination Impaired (Stroke, Hemorrhagic)  Goal: Effective Urinary Elimination  Outcome: Ongoing, Progressing     Problem: Fall Injury Risk  Goal: Absence of Fall and Fall-Related Injury  Outcome: Ongoing, Progressing     Problem: Fluid Imbalance (Pneumonia)  Goal: Fluid Balance  Outcome: Ongoing, Progressing     Problem: Infection (Pneumonia)  Goal: Resolution of Infection Signs and Symptoms  Outcome: Ongoing, Progressing     Problem: Respiratory Compromise (Pneumonia)  Goal: Effective Oxygenation and Ventilation  Outcome: Ongoing, Progressing     Problem: Fever  Goal: Body Temperature in Desired Range  Outcome: Ongoing, Progressing

## 2022-09-01 NOTE — PROGRESS NOTES
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    Treatment History  Fludarabine/Rituxan x 5 cycles (1/15)  WinRho 5/18  Rituxan x4 (6/18)  Promacta 8/18-present    He was initially treated with 5 cycles of FR CLL with a CR.  Cycle 6 was held due to cumulative cytopenias despite dose reduction.  His counts recovered gradually.  He developed acute ITP 5/18 without evidence of recurrence of his CLL at that time.  He was refractory to treatment with prednisone, WinRho and Rituxan.  He responded to treatment with Promacta and was continued on treatment.  He developed recurrence of his CLL and was started on Acalabrutinib 4/25/22. Platelet count at that time was 140,000 and Promacta was held.  Treatment was well tolerated.      07/28/22: Plts 30,000. Resumed Promacta 50 mg/d.  08/08/22: Plts 1,000. Prednisone 40 mg/d.  08/10/22: Plts 1,000. IVIG 1 gm/kg x 2 days.  08/15/22: Plts 4,000. D/C Acalabrutinib and increased Promacta to 100 mg/d.  08/18/22: Plts 2,000. Transfuse 1 unit platelets on 8/19/22.    Patient fell at home on the evening of 8/18/22 landing on his side.  He did not lose consciousness, he denies hitting his head.  He did have a headache.  Has a hematoma involving the right forearm.  CBC showed progressive leukocytosis and anemia.  Hospital admission was recommended for additional workup and treatment.  He was alert and oriented.  He had no focal neurologic deficits.  He denied any fever or chills.  CT of the head without contrast showed a left parietal lobe heterogeneous hyperdensity - could not rule out partially calcified meningioma or small arachnoid hemorrhage.  Case was discussed with Neurosurgery. MRI brain 08/19/2022 revealed a 13 mm dural-based enhancing mass along the left parietal convexity most suggestive of meningioma with a trace subdural and subarachnoid hemorrhage along the right cerebral  convexity.      He was observed in the ICU with no progressive neurologic symptoms. He spiked a fever on 8/21/22 and blood cultures were obtained.  He was continued on IV Cefepime.  He received PRBC and platelet transfusions for symptomatic anemia and ongoing thrombocytopenia.  He continued to have fever with negative blood cultures.  Chest x-ray 08/22/22 showed a possible LLL infiltrate.  CT of the chest showed a dense LLL consolidation and additional patchy ground-glass opacities.  Azithromycin was added.  Respiratory PCR panel was negative.  MRSA PCR negative.  Repeat COVID PCR negative.     He continued to spike fevers.  CT C/A/P 8/25/22 showed an unchanged dense LLL consolidation with slight improvement of multifocal ground-glass opacities.  There was prominent paratracheal, retroperitoneal and mesenteric adenopathy and mild splenomegaly.  There was a large left renal cyst with no evidence of hydronephrosis.  He continued to require PRBC and platelet transfusions. A bone marrow aspirate and biopsy with cultures was done on 8/29/22.  Azithromycin was discontinued and itraconazole was added.  His fevers were not responsive to Tylenol or NSAIDs.  He required IV Decadron to decrease his fever.  He was restarted on prednisone 20 mg daily 8/30/22 and increased to 40 mg daily 8/31/22.    Preliminary bone marrow review showed a hypocellular marrow with extensive involvement by small, mature lymphocytes consistent with CLL/SLL.  There were essentially no normal hematopoietic elements.    Today (9/01/22):  He continues to spike significant fevers with intermittent chills.  Increased cough with white sputum production and occasional streak hemoptysis.  Fungitell assay negative, Aspergillus antigen negative.  Bone marrow cultures pending.  WBC count increasing since prednisone restarted.  Differential shows predominantly lymphocytes.  Clinical picture atypical for CLL.  Still concerned about possible  transformation.      SUBJECTIVE:     Continuous Infusions:   sodium chloride 0.9% Stopped (08/29/22 1149)     Scheduled Meds:   eltrombopag  100 mg Oral Daily    famotidine  20 mg Oral BID    flumazeniL  0.2 mg Intravenous Once    guaiFENesin  600 mg Oral BID    itraconazole  200 mg Oral BID    LIDOcaine (PF) 10 mg/ml (1%)  1 mL Other Once    meropenem (MERREM) IVPB  1 g Intravenous Q8H    multivitamin  1 tablet Oral Daily    predniSONE  20 mg Oral Daily     PRN Meds:    Review of patient's allergies indicates:   Allergen Reactions    Sulfa (sulfonamide antibiotics)      Other reaction(s): VOMITING    Sulfamethoxazole-trimethoprim Nausea Only and Nausea And Vomiting     Other reaction(s): Diaphoresis.        Review of Systems:  Constitutional: + fever, + chills, + generalized weakness, + fatigue.    Eye:  No icterus, No blurring, No double vision, No visual disturbances.    Ear/Nose/Mouth/Throat:  No decreased hearing, No nasal congestion, No sore throat.  No gum bleeding, no epistaxis.  Respiratory:  No shortness of breath, + cough, + white sputum production, streak hemoptysis, No wheezing.    Cardiovascular:  No chest pain, No palpitations, No tachycardia.    Gastrointestinal:  No nausea, No vomiting, No diarrhea, mild constipation, No abdominal pain.    Genitourinary:  No dysuria, Mild hematuria, No change in urine stream.    Hematology/Lymphatics:  + bruising tendency, + bleeding tendency, No swollen lymph glands.    Musculoskeletal:  No joint pain or back pain. No LE edema. R arm hematoma.  Integumentary:  No rash, No pruritus, + petechiae.    Neurologic:  Alert and oriented X4, No abnormal balance, No headache.    PMHx:   CLL, ITP, HTN  PSHx: Appendectomy, Laparoscopic Cholecystectomy, R arm skin graft, Bone marrow biopsy, Umbilical hernia repair, Colonoscopy 7/17  SH: Lifetime nonsmoker, + Social alcohol use, Lives in [place] with his wife  FH: Father had prostate cancer, mother had renal cell carcinoma        OBJECTIVE:     Vital Signs (Most Recent)  Temp: (!) 101 °F (38.3 °C) (09/01/22 0449)  Pulse: 93 (09/01/22 0346)  Resp: 20 (09/01/22 0346)  BP: (!) 145/80 (09/01/22 0346)  SpO2: (!) 92 % (09/01/22 0346)    Physical Exam:  General: well developed, ill-appearing white male in NAD.  HEENT: normocephalic, atraumatic, conjunctivae/corneas clear.  OP clear, no lesions or petechiae.  Lungs:  Faint crackles left lower chest.  Right lung clear.  Heart: regular rate and rhythm, no murmur.  Abdomen: soft, non-tender non-distented; +BS; no palpable masses or organomegaly  Extremities: no LE edema.  Skin: Warm, intact. No rashes or lesions.  Scattered ecchymoses and petechiae on the extremities.  Evolving right forearm hematoma.  Neurologic: Alert and oriented, no focal deficits, normal strength.  Gait normal.      Laboratory:  CBC with Differential:  WBC 26.9, hemoglobin 7.3, hematocrit 21.6, platelets 9000    Recent Labs   Lab 09/01/22 0348   WBC 42.2*   RBC 2.48*   HCT 21.9*   HGB 7.3*   MCV 88.3   MCH 29.4   RDW 14.1   PLT 12*   MPV 11.2*       CMP:  No results for input(s): GLU, CALCIUM, ALBUMIN, PROT, NA, K, CO2, CL, BUN, CREATININE, ALKPHOS, ALT, AST, BILITOT in the last 24 hours.      Diagnostic Results:  Chest x-ray images reviewed.      ASSESSMENT/PLAN:   ASSESSMENT  Recurrent CLL   Transfusion-dependent anemia and thrombocytopenia  LLL pneumonia  Persistent fever with negative cultures      PLAN  D/C Meropenem and Itraconazole.  All cultures negative.  Fungitell and Aspergillus antigen also negative.  Ongoing fevers more suggestive of a lymphoproliferative process rather than an infectious process.  Bone marrow shows involvement by CLL with marked decrease of normal hematopoietic elements.  Consult Surgical Oncology for laparoscopic mesenteric lymph node biopsy.  He will require platelet transfusions prior to and during surgery to decrease risk of bleeding.  Treatment plan discussed with the patient and his  wife.  All questions answered.      DUKE LYNCH MD

## 2022-09-01 NOTE — PROGRESS NOTES
SUBJECTIVE:  Clinically unchanged.  Oncology planning on LN biopsy given concern for transformation.  Abx Dc'd earlier per them as infectious workup has remained negative and ongoing fevers are likely noninfectious in origin.        REVIEW OF SYSTEMS: Negative unless stated above         MEDICATIONS:   Reviewed in EMR        PHYSICAL EXAM:   Vitals:    09/01/22 0700   BP: 137/78   Pulse: 101   Resp: (!) 22   Temp: 99 °F (37.2 °C)     GENERAL: NAD; does not appear toxic  SKIN: no rash  HEENT: sclera non-icteric; PERRL  NECK: supple; no LAD  CHEST: CTA; nonlabored, equal expansion   CARDIOVASCULAR: RRR, S1S2; no murmur; strong, equal peripheral pulses; no edema  ABDOMEN:  active bowel sounds; abdomen soft, nondistended, nontender to palpation  EXTREMITIES: no cyanosis or clubbing  NEURO: AAO x4; CN II-XII grossly intact  PSYCH: Mentation and affect appropriate          LABORATORY DATA: Reviewed         RADIOLOGICAL DATA: Reviewed       IMPRESSION:   He is a 67-year-old male with a past medical history of CLL and refractory ITP who presented after falling at home.  He was found to possibly have a small intracranial hemorrhage which resolved without intervention.  He then began with fevers, and imaging revealed a left lower lobe pneumonia.  Id consulted for input given patient's immunocompromised status.    Persistent fever  Left lower lobe pneumonia, resolving  Possible ICH s/t fall at home, stable without intervention  H/o CLL and refractory ITP on chronic prednisone and Promacta       PLAN:  Infectious work-up remains negative.  Agree with DC'ing abx.  Oncology concern for transformation noted - f/u LN biopsy results.    Discussed with patient.

## 2022-09-01 NOTE — CONSULTS
Consults    Chief complaint: CLL AND REFRACTORY ITP    HPI: 67 YEAR OLD MALE WITH COMPLICATED PRESENTING ISSUES WHO ESSENTIALLY HAS RECURRENT CLL WITH TRANSFUSION DEPENDENT ANEMIA AND THROMBOCYTOPENIA, LLL PNEUMONIA, AND PERSISTENT FEVERS WITH NEGATIVE CULTURES; CT SCAN OF THE ABD SHOWED PROMINENT RETROPERITONEAL AND MESENTERIC ADENOPATHY SUSPICIOUS FOR LYMPHOMA; WE HAVE BEEN ASKED TO SEE THE PT FOR MESENTERIC NODE BIOPSY    THE PT IS RECEIVING DAILY TRANSFUSIONS OF PLATELETS WITH [place] OF 67[address]  WBC 15893   7/21      Past Medical and Surgical History    Allergies :  Sulfa (sulfonamide antibiotics) and Sulfamethoxazole-trimethoprim  @[email or web address]@    Medical :  He has a past medical history of Chronic ITP (idiopathic thrombocytopenia), Hypertension, and Leukemia.    Surgical :  He has a past surgical history that includes Appendectomy; Laparoscopic cholecystectomy; Skin graft (N/A); Bone marrow biopsy; Umbilical hernia repair; Colonoscopy; and Bone marrow aspiration (N/A, 8/29/2022).    Family History  His family history includes Kidney cancer in his mother; Prostate cancer in his father.    Social History  He reports that he has never smoked. He has never used smokeless tobacco. He reports current alcohol use.    Review of Systems   Constitutional:  Positive for fatigue and fever.   HENT: Negative.     Eyes: Negative.    Respiratory:  Positive for cough.    Cardiovascular: Negative.    Gastrointestinal: Negative.    Endocrine: Negative.    Genitourinary: Negative.    Musculoskeletal:  Positive for arthralgias.   Skin: Negative.    Allergic/Immunologic: Negative.    Neurological: Negative.    Hematological: Negative.    Psychiatric/Behavioral: Negative.         Objective     Physical Exam  Constitutional:       Appearance: Normal appearance.   HENT:      Mouth/Throat:      Mouth: Mucous membranes are moist.   Cardiovascular:      Rate and Rhythm: Normal rate and regular rhythm.   Pulmonary:       Effort: Pulmonary effort is normal.      Breath sounds: Normal breath sounds.   Abdominal:      General: Abdomen is flat.      Palpations: Abdomen is soft.   Musculoskeletal:         General: Normal range of motion.   Skin:     General: Skin is warm and dry.   Neurological:      General: No focal deficit present.      Mental Status: He is alert and oriented to person, place, and time. Mental status is at baseline.   Psychiatric:         Mood and Affect: Mood normal.         Behavior: Behavior normal.         Thought Content: Thought content normal.         Judgment: Judgment normal.       @[facility] [place]@    Assessment & Plan     Active Hospital Problems    Diagnosis  POA    *Chronic ITP (idiopathic thrombocytopenia) [D69.3]  Yes    Subdural hematoma [S06.5X9A]  Yes    Pneumonia of left lower lobe due to infectious organism [J18.9]  No    Immunocompromised [D84.9]  Yes    Fever [R50.9]  No    CLL (chronic lymphoid leukemia) in relapse [C91.12]  Yes      Resolved Hospital Problems   No resolved problems to display.       IMPRESSION: RECURRENT CLL WITH PERSISTENT FEVER, ANEMIA, THROMBOCYTOPENIA    PLAN: DR CANO TO REVEW AND MAKE RECS REGARDING MESENTERIC LYMPH NODE BIOPSY

## 2022-09-02 NOTE — TRANSFER OF CARE
"Anesthesia Transfer of Care Note    Patient: Ronny Cloud    Procedure(s) Performed: Procedure(s) (LRB):  LAPAROSCOPY, DIAGNOSTIC (N/A)    Patient location: PACU    Anesthesia Type: general    Transport from OR: Transported from OR on 6-10 L/min O2 by face mask with adequate spontaneous ventilation    Post pain: adequate analgesia    Post assessment: no apparent anesthetic complications and tolerated procedure well    Post vital signs: stable    Level of consciousness: awake and alert    Nausea/Vomiting: no nausea/vomiting    Complications: none    Transfer of care protocol was followed      Last vitals:   Visit Vitals  /66 (BP Location: Right arm, Patient Position: Lying)   Pulse (!) 114   Temp (!) 39.4 °C (103 °F)   Resp (!) 22   Ht 6' 0.05" (1.83 m)   Wt 123.6 kg (272 lb 7.8 oz)   SpO2 95%   BMI 36.91 kg/m²     "

## 2022-09-02 NOTE — ANESTHESIA PREPROCEDURE EVALUATION
"                                                                                                             09/02/2022  Ronny Cloud is a 67 y.o., male   Pre-operative evaluation for Procedure(s) (LRB):  LAPAROSCOPY, DIAGNOSTIC (N/A)    /75   Pulse 105   Temp (!) 39.3 °C (102.7 °F)   Resp 18   Ht 6' 0.05" (1.83 m)   Wt 123.6 kg (272 lb 7.8 oz)   SpO2 95%   BMI 36.91 kg/m²     Patient Active Problem List   Diagnosis    CLL (chronic lymphoid leukemia) in relapse    Chronic ITP (idiopathic thrombocytopenia)    Subdural hematoma    Pneumonia of left lower lobe due to infectious organism    Immunocompromised    Fever       Review of patient's allergies indicates:   Allergen Reactions    Sulfa (sulfonamide antibiotics)      Other reaction(s): VOMITING    Sulfamethoxazole-trimethoprim Nausea Only and Nausea And Vomiting     Other reaction(s): Diaphoresis.       Current Outpatient Medications   Medication Instructions    CALQUENCE 100 mg Cap 1 capsule, Oral, 2 times daily    eltrombopag (PROMACTA) 100 mg, Oral, Daily    losartan (COZAAR) 100 mg, Oral, Daily    multivitamin with minerals tablet 1 tablet, Oral, Daily    predniSONE (DELTASONE) 40 mg, Oral, Daily       Past Surgical History:   Procedure Laterality Date    APPENDECTOMY      BONE MARROW ASPIRATION N/A 8/29/2022    Procedure: ASPIRATION, BONE MARROW;  Surgeon: Claude Rand MD;  Location: [facility];  Service: General;  Laterality: N/A;    BONE MARROW BIOPSY      COLONOSCOPY      7/2017    LAPAROSCOPIC CHOLECYSTECTOMY      SKIN GRAFT N/A     R arm    UMBILICAL HERNIA REPAIR         Social History     Socioeconomic History    Marital status:    Tobacco Use    Smoking status: Never    Smokeless tobacco: Never   Substance and Sexual Activity    Alcohol use: Yes     Comment: social       Lab Results   Component Value Date    WBC 39.8 (H) 09/02/2022    HGB 6.5 (L) 09/02/2022    HCT 18.6 (LL) 09/02/2022    MCV 86.1 09/02/2022 "    PLT 6 (LL) 09/02/2022          BMP  Lab Results   Component Value Date     (LL) 09/02/2022    K 4.4 09/02/2022    CHLORIDE 88 (L) 09/02/2022    CO2 25 09/02/2022    GLUCOSE 185 (H) 09/02/2022    BUN 21.3 09/02/2022    CREATININE 0.72 (L) 09/02/2022    CALCIUM 7.9 (L) 09/02/2022    EGFRNONAA >60 07/28/2022        INR  No results for input(s): PT, INR, PROTIME, APTT in the last 72 hours.        Diagnostic Studies:      EKG:  Results for orders placed or performed during the hospital encounter of 08/19/22   EKG 12-lead    Collection Time: 08/19/22 12:40 PM    Narrative    Test Reason : R55,    Vent. Rate : 072 BPM     Atrial Rate : 072 BPM     P-R Int : 134 ms          QRS Dur : 102 ms      QT Int : 410 ms       P-R-T Axes : 020 -09 012 degrees     QTc Int : 448 ms    Sinus rhythm with Premature atrial complexes  Minimal voltage criteria for LVH, may be normal variant ( R in aVL )  Borderline Abnormal ECG    Confirmed by Nataliia Alfaro MD ([id number]) on 8/19/2022 8:24:03 PM    Referred By: [email or web address]   SELF           Confirmed By:Nataliia Alfaro MD        PLAN  Bone marrow showed involvement by CLL with marked decrease of normal hematopoietic elements.  Explains why he has had no response to Promacta.  All antibiotics have been discontinued.  All cultures to date remain negative.  Laparoscopic mesenteric lymph node biopsy scheduled for later today.  Transfuse 2 units platelets and recheck platelet count prior to surgery.  Transfuse 1 unit PRBCs.  500 cc normal saline bolus and repeat sodium level.  Prognosis is guarded.        DUKE VELEZ MD         Electronically signed by Duke Velez MD at 9/2/2022  7:50 AM    .      Pre-op Assessment    I have reviewed the Patient Summary Reports.    I have reviewed the NPO Status.   I have reviewed the Medications.     Review of Systems  Anesthesia Hx:  No problems with previous Anesthesia  Denies Family Hx of Anesthesia complications.     Cardiovascular:  Cardiovascular Normal  No Cardiac Complaints   Pulmonary:   Pneumonia    Hepatic/GI:   No Current GERD Sx       Physical Exam  General: Alert and Oriented    Airway:  Mallampati: II   Mouth Opening: Normal  TM Distance: Normal  Tongue: Normal  Neck ROM: Normal ROM    Dental:  Intact, Caps / Implants    Chest/Lungs:  Tachypnea  Dyspneic w decreased BS on L  Heart:  Rate: Tachycardia  Rhythm: Frequent Prematures        Anesthesia Plan  Type of Anesthesia, risks & benefits discussed:    Anesthesia Type: Gen ETT  Intra-op Monitoring Plan: Standard ASA Monitors  Post Op Pain Control Plan: multimodal analgesia  Induction:  IV and Inhalation  Airway Plan: Direct, Post-Induction  Informed Consent: Informed consent signed with the Patient and all parties understand the risks and agree with anesthesia plan.  All questions answered. Patient consented to blood products? No  ASA Score: 4  Day of Surgery Review of History & Physical: H&P Update referred to the surgeon/provider.  Anesthesia Plan Notes: Discussed Anesthetic Plan w/ Pt/Family. Questions Entertained. Accepted.\    2 u Plt  1u PRBC in  Second PRBC ready    Ready For Surgery From Anesthesia Perspective.     .

## 2022-09-02 NOTE — PLAN OF CARE
Problem: Adult Inpatient Plan of Care  Goal: Plan of Care Review  Outcome: Ongoing, Progressing  Goal: Patient-Specific Goal (Individualized)  Outcome: Ongoing, Progressing  Goal: Absence of Hospital-Acquired Illness or Injury  Outcome: Ongoing, Progressing  Goal: Optimal Comfort and Wellbeing  Outcome: Ongoing, Progressing  Goal: Readiness for Transition of Care  Outcome: Ongoing, Progressing     Problem: Adjustment to Illness (Stroke, Hemorrhagic)  Goal: Optimal Coping  Outcome: Ongoing, Progressing     Problem: Bowel Elimination Impaired (Stroke, Hemorrhagic)  Goal: Effective Bowel Elimination  Outcome: Ongoing, Progressing     Problem: Cerebral Tissue Perfusion (Stroke, Hemorrhagic)  Goal: Optimal Cerebral Tissue Perfusion  Outcome: Ongoing, Progressing     Problem: Cognitive Impairment (Stroke, Hemorrhagic)  Goal: Optimal Cognitive Function  Outcome: Ongoing, Progressing     Problem: Communication Impairment (Stroke, Hemorrhagic)  Goal: Effective Communication Skills  Outcome: Ongoing, Progressing     Problem: Functional Ability Impaired (Stroke, Hemorrhagic)  Goal: Optimal Functional Ability  Outcome: Ongoing, Progressing     Problem: Pain (Stroke, Hemorrhagic)  Goal: Acceptable Pain Control  Outcome: Ongoing, Progressing     Problem: Respiratory Compromise (Stroke, Hemorrhagic)  Goal: Effective Oxygenation and Ventilation  Outcome: Ongoing, Progressing     Problem: Sensorimotor Impairment (Stroke, Hemorrhagic)  Goal: Improved Sensorimotor Function  Outcome: Ongoing, Progressing     Problem: Swallowing Impairment (Stroke, Hemorrhagic)  Goal: Optimal Eating and Swallowing Without Aspiration  Outcome: Ongoing, Progressing     Problem: Urinary Elimination Impaired (Stroke, Hemorrhagic)  Goal: Effective Urinary Elimination  Outcome: Ongoing, Progressing     Problem: Fall Injury Risk  Goal: Absence of Fall and Fall-Related Injury  Outcome: Ongoing, Progressing     Problem: Fluid Imbalance (Pneumonia)  Goal: Fluid  Balance  Outcome: Ongoing, Progressing     Problem: Infection (Pneumonia)  Goal: Resolution of Infection Signs and Symptoms  Outcome: Ongoing, Progressing     Problem: Respiratory Compromise (Pneumonia)  Goal: Effective Oxygenation and Ventilation  Outcome: Ongoing, Progressing     Problem: Fever  Goal: Body Temperature in Desired Range  Outcome: Ongoing, Progressing      Alert and oriented to person, place and time

## 2022-09-02 NOTE — ANESTHESIA PROCEDURE NOTES
Intubation    Date/Time: 9/2/2022 4:10 PM  Performed by: Joey Gerhardt, CRNA  Authorized by: Terrell Gutierrez MD     Intubation:     Induction:  Intravenous    Intubated:  Postinduction    Mask Ventilation:  Easy mask    Attempts:  1    Attempted By:  CRNA    Method of Intubation:  Direct    Blade:  Chun 3    Laryngeal View Grade: Grade I - full view of cords      Difficult Airway Encountered?: No      Airway Device:  Oral endotracheal tube    Airway Device Size:  7.5    Style/Cuff Inflation:  Cuffed (inflated to minimal occlusive pressure)    Inflation Amount (mL):  7    Tube secured:  23    Secured at:  The lips    Placement Verified By:  Capnometry    Complicating Factors:  None

## 2022-09-02 NOTE — PROGRESS NOTES
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    Treatment History  Fludarabine/Rituxan x 5 cycles (1/15)  WinRho 5/18  Rituxan x4 (6/18)  Promacta 8/18-present    He was initially treated with 5 cycles of FR CLL with a CR.  Cycle 6 was held due to cumulative cytopenias despite dose reduction.  His counts recovered gradually.  He developed acute ITP 5/18 without evidence of recurrence of his CLL at that time.  He was refractory to treatment with prednisone, WinRho and Rituxan.  He responded to treatment with Promacta and was continued on treatment.  He developed recurrence of his CLL and was started on Acalabrutinib 4/25/22. Platelet count at that time was 140,000 and Promacta was held.  Treatment was well tolerated.      07/28/22: Plts 30,000. Resumed Promacta 50 mg/d.  08/08/22: Plts 1,000. Prednisone 40 mg/d.  08/10/22: Plts 1,000. IVIG 1 gm/kg x 2 days.  08/15/22: Plts 4,000. D/C Acalabrutinib and increased Promacta to 100 mg/d.  08/18/22: Plts 2,000. Transfuse 1 unit platelets on 8/19/22.    Patient fell at home on the evening of 8/18/22 landing on his side.  He did not lose consciousness, he denies hitting his head.  He did have a headache.  Has a hematoma involving the right forearm.  CBC showed progressive leukocytosis and anemia.  Hospital admission was recommended for additional workup and treatment.  He was alert and oriented.  He had no focal neurologic deficits.  He denied any fever or chills.  CT of the head without contrast showed a left parietal lobe heterogeneous hyperdensity - could not rule out partially calcified meningioma or small arachnoid hemorrhage.  Case was discussed with Neurosurgery. MRI brain 08/19/2022 revealed a 13 mm dural-based enhancing mass along the left parietal convexity most suggestive of meningioma with a trace subdural and subarachnoid hemorrhage along the right cerebral  convexity.      He was observed in the ICU with no progressive neurologic symptoms. He spiked a fever on 8/21/22 and blood cultures were obtained.  He was continued on IV Cefepime.  He received PRBC and platelet transfusions for symptomatic anemia and ongoing thrombocytopenia.  He continued to have fever with negative blood cultures.  Chest x-ray 08/22/22 showed a possible LLL infiltrate.  CT of the chest showed a dense LLL consolidation and additional patchy ground-glass opacities.  Azithromycin was added.  Respiratory PCR panel was negative.  MRSA PCR negative.  Repeat COVID PCR negative.     He continued to spike fevers.  CT C/A/P 8/25/22 showed an unchanged dense LLL consolidation with slight improvement of multifocal ground-glass opacities.  There was prominent paratracheal, retroperitoneal and mesenteric adenopathy and mild splenomegaly.  There was a large left renal cyst with no evidence of hydronephrosis.  He continued to require PRBC and platelet transfusions. A bone marrow aspirate and biopsy with cultures was done on 8/29/22.  Azithromycin was discontinued and itraconazole was added.  His fevers were not responsive to Tylenol or NSAIDs.  He required IV Decadron to decrease his fever.  He was restarted on prednisone 20 mg daily 8/30/22 and increased to 40 mg daily 8/31/22.    Bone marrow aspirate and biopsy 8/29/22 showed a hypocellular marrow at 20% with predominant involvement by CLL/SLL and absent background trilineage hematopoiesis.  No evidence of prolymphocytic transformation.  Bone marrow aerobic and anaerobic cultures negative.  Mycobacterial and fungal cultures pending.  Fungitell assay negative, Aspergillus antigen negative.  Meropenem and itraconazole discontinued 9/01/22.  Surgical oncology consulted for mesenteric lymph node biopsy.    Today (9/02/22):  He continues to have fevers with negative cultures.  Laparoscopic mesenteric lymph node biopsy planned for this afternoon.  Platelet count  this morning was 6000.  2 units of platelets ordered.        SUBJECTIVE:     Continuous Infusions:   sodium chloride 0.9% Stopped (08/29/22 1149)     Scheduled Meds:   acetaminophen  650 mg Oral Once    diphenhydrAMINE  12.5 mg Intravenous Once    eltrombopag  100 mg Oral Daily    famotidine  20 mg Oral BID    guaiFENesin  600 mg Oral BID    multivitamin  1 tablet Oral Daily    predniSONE  40 mg Oral Daily     PRN Meds:    Review of patient's allergies indicates:   Allergen Reactions    Sulfa (sulfonamide antibiotics)      Other reaction(s): VOMITING    Sulfamethoxazole-trimethoprim Nausea Only and Nausea And Vomiting     Other reaction(s): Diaphoresis.        Review of Systems:  Constitutional: + fever, + chills, + generalized weakness, + fatigue.    Eye:  No icterus, No blurring, No double vision, No visual disturbances.    Ear/Nose/Mouth/Throat:  No decreased hearing, No nasal congestion, No sore throat.  No gum bleeding, no epistaxis.  Respiratory:  No shortness of breath, + cough, + white sputum production, streak hemoptysis, No wheezing.    Cardiovascular:  No chest pain, No palpitations, No tachycardia.    Gastrointestinal:  No nausea, No vomiting, No diarrhea, mild constipation, No abdominal pain.    Genitourinary:  No dysuria, Mild hematuria, No change in urine stream.    Hematology/Lymphatics:  + bruising tendency, + bleeding tendency, No peripheral adenopathy.  Musculoskeletal:  No joint pain or back pain. No LE edema. R arm hematoma.  Integumentary:  No rash, No pruritus, + petechiae.    Neurologic:  Alert and oriented X4, No abnormal balance, No headache.    PMHx:   CLL, ITP, HTN  PSHx: Appendectomy, Laparoscopic Cholecystectomy, R arm skin graft, Bone marrow biopsy, Umbilical hernia repair, Colonoscopy 7/17  SH: Lifetime nonsmoker, + Social alcohol use, Lives in [place] with his wife  FH: Father had prostate cancer, mother had renal cell carcinoma       OBJECTIVE:     Vital Signs (Most Recent)  Temp:  (!) 101.6 °F (38.7 °C) (09/02/22 0630)  Pulse: 93 (09/02/22 0417)  Resp: 18 (09/02/22 0417)  BP: (!) 154/75 (09/02/22 0417)  SpO2: (!) 94 % (09/02/22 0417)    Physical Exam:  General: well developed, ill-appearing white male in NAD.  HEENT: normocephalic, atraumatic, conjunctivae/corneas clear.  OP clear, no lesions or petechiae.  Lungs:  Faint crackles left lower chest.  Right lung clear.  Heart: regular rate and rhythm, no murmur.  Abdomen: soft, non-tender non-distented; +BS; no palpable masses or organomegaly  Extremities: no LE edema.  Skin: Warm, intact. No rashes or lesions.  Scattered ecchymoses and petechiae on the extremities.  Evolving right forearm hematoma.  Neurologic: Alert and oriented, no focal deficits, normal strength.  Gait normal.      Laboratory:    Recent Labs   Lab 09/02/22 0437   WBC 39.8*   RBC 2.16*   HCT 18.6*   HGB 6.5*   MCV 86.1   MCH 30.1   RDW 13.8   PLT 6*   MPV 10.9*       CMP:  Recent Labs   Lab 09/02/22 0437   CALCIUM 7.9*   ALBUMIN 2.3*   *   K 4.4   CO2 25   BUN 21.3   CREATININE 0.72*   ALKPHOS 70   ALT 37   AST 13   BILITOT 1.9*       Diagnostic Results:  No new imaging for review.      ASSESSMENT/PLAN:   ASSESSMENT  Recurrent CLL   Transfusion-dependent anemia and thrombocytopenia  LLL pneumonia - improved  Persistent fever with negative cultures  Hyponatremia      PLAN  Bone marrow showed involvement by CLL with marked decrease of normal hematopoietic elements.  Explains why he has had no response to Promacta.  All antibiotics have been discontinued.  All cultures to date remain negative.  Laparoscopic mesenteric lymph node biopsy scheduled for later today.  Transfuse 2 units platelets and recheck platelet count prior to surgery.  Transfuse 1 unit PRBCs.  500 cc normal saline bolus and repeat sodium level.  Prognosis is guarded.      DUKE LYNCH MD

## 2022-09-03 NOTE — PROGRESS NOTES
Pharmacokinetic Initial Assessment: IV Vancomycin    Assessment/Plan:    Initiate intravenous vancomycin with regimen of 1250 mg IV every 12 hours  Desired empiric serum trough concentration is 15 to 20 mcg/mL  Draw vancomycin trough level 60 min prior to fourth dose on 9/4 at approximately 1400  Pharmacy will continue to follow and monitor vancomycin.      Please contact pharmacy at extension [phone] with any questions regarding this assessment.     Thank you for the consult,   Moris Dorado       Patient brief summary:  Ronny Cloud is a 67 y.o. male initiated on antimicrobial therapy with IV Vancomycin for treatment of suspected  PNA    Drug Allergies:   Review of patient's allergies indicates:   Allergen Reactions    Sulfa (sulfonamide antibiotics)      Other reaction(s): VOMITING    Sulfamethoxazole-trimethoprim Nausea Only and Nausea And Vomiting     Other reaction(s): Diaphoresis.       Actual Body Weight:   123 kg    Renal Function:   Estimated Creatinine Clearance: 126.7 mL/min (based on SCr of 0.79 mg/dL).,     Dialysis Method (if applicable):  N/A    CBC (last 72 hours):  Recent Labs   Lab Result Units 09/01/22  0348 09/02/22 0437 09/02/22 1435 09/02/22 1755 09/03/22 0314   WBC x10(3)/mcL 42.2* 39.8* 38.9* 51.6* 31.2*   Hgb gm/dL 7.3* 6.5* 6.2* 7.7* 6.9*   Hct % 21.9* 18.6* 17.4* 23.2* 20.2*   Platelet x10(3)/mcL 12* 6* 33* 34* 44*   Monocyte Man %  --  1  --   --   --        Metabolic Panel (last 72 hours):  Recent Labs   Lab Result Units 09/02/22  0437 09/02/22  1435 09/02/22  1755 09/03/22  0314   Sodium Level mmol/L 119* 123* 124* 124*   Potassium Level mmol/L 4.4 4.6 4.5 4.3   Chloride mmol/L 88* 91* 93* 92*   Carbon Dioxide mmol/L 25 22* 22* 23   Glucose Level mg/dL 185* 171* 227* 223*   Blood Urea Nitrogen mg/dL 21.3 23.2 24.7 26.5*   Creatinine mg/dL 0.72* 0.83 0.86 0.79   Albumin Level gm/dL 2.3*  --  2.2* 2.2*   Bilirubin Total mg/dL 1.9*  --  1.7* 2.4*   Alkaline Phosphatase unit/L 70  --  83  97   Aspartate Aminotransferase unit/L 13  --  43* 28   Alanine Aminotransferase unit/L 37  --  49 42       Drug levels (last 3 results):  No results for input(s): VANCOMYCINRA, VANCORANDOM, VANCOMYCINPE, VANCOPEAK, VANCOMYCINTR, VANCOTROUGH in the last 72 hours.    Microbiologic Results:  Microbiology Results (last 7 days)       Procedure Component Value Units Date/Time    Blood Culture [[id number]]  (Normal) Collected: 08/23/22 0843    Order Status: Completed Specimen: Blood Updated: 08/28/22 1100     CULTURE, BLOOD ([facility]) No Growth at 5 days    Blood Culture [[id number]]  (Normal) Collected: 08/23/22 0843    Order Status: Completed Specimen: Blood Updated: 08/28/22 1100     CULTURE, BLOOD ([facility]) No Growth at 5 days    Respiratory Culture [[id number]]  (Abnormal) Collected: 08/25/22 2001    Order Status: Completed Specimen: Sputum Updated: 08/28/22 0723     Respiratory Culture Normal respiratory damari     GRAM STAIN 1+      Moderate Gram positive cocci      Moderate Gram Negative Rods    Blood Culture [[id number]]  (Normal) Collected: 08/22/22 0642    Order Status: Completed Specimen: Blood, Venous Updated: 08/27/22 0800     CULTURE, BLOOD ([facility]) No Growth at 5 days

## 2022-09-03 NOTE — PROGRESS NOTES
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    Treatment History  Fludarabine/Rituxan x 5 cycles (1/15)  WinRho 5/18  Rituxan x4 (6/18)  Promacta 8/18-present    He was initially treated with 5 cycles of FR CLL with a CR.  Cycle 6 was held due to cumulative cytopenias despite dose reduction.  His counts recovered gradually.  He developed acute ITP 5/18 without evidence of recurrence of his CLL at that time.  He was refractory to treatment with prednisone, WinRho and Rituxan.  He responded to treatment with Promacta and was continued on treatment.  He developed recurrence of his CLL and was started on Acalabrutinib 4/25/22. Platelet count at that time was 140,000 and Promacta was held.  Treatment was well tolerated.      07/28/22: Plts 30,000. Resumed Promacta 50 mg/d.  08/08/22: Plts 1,000. Prednisone 40 mg/d.  08/10/22: Plts 1,000. IVIG 1 gm/kg x 2 days.  08/15/22: Plts 4,000. D/C Acalabrutinib and increased Promacta to 100 mg/d.  08/18/22: Plts 2,000. Transfuse 1 unit platelets on 8/19/22.    Patient fell at home on the evening of 8/18/22 landing on his side.  He did not lose consciousness, he denies hitting his head.  He did have a headache.  Has a hematoma involving the right forearm.  CBC showed progressive leukocytosis and anemia.  Hospital admission was recommended for additional workup and treatment.  He was alert and oriented.  He had no focal neurologic deficits.  He denied any fever or chills.  CT of the head without contrast showed a left parietal lobe heterogeneous hyperdensity - could not rule out partially calcified meningioma or small arachnoid hemorrhage.  Case was discussed with Neurosurgery. MRI brain 08/19/2022 revealed a 13 mm dural-based enhancing mass along the left parietal convexity most suggestive of meningioma with a trace subdural and subarachnoid hemorrhage along the right cerebral  convexity.      He was observed in the ICU with no progressive neurologic symptoms. He spiked a fever on 8/21/22 and blood cultures were obtained.  He was continued on IV Cefepime.  He received PRBC and platelet transfusions for symptomatic anemia and ongoing thrombocytopenia.  He continued to have fever with negative blood cultures.  Chest x-ray 08/22/22 showed a possible LLL infiltrate.  CT of the chest showed a dense LLL consolidation and additional patchy ground-glass opacities.  Azithromycin was added.  Respiratory PCR panel was negative.  MRSA PCR negative.  Repeat COVID PCR negative.     He continued to spike fevers.  CT C/A/P 8/25/22 showed an unchanged dense LLL consolidation with slight improvement of multifocal ground-glass opacities.  There was prominent paratracheal, retroperitoneal and mesenteric adenopathy and mild splenomegaly.  There was a large left renal cyst with no evidence of hydronephrosis.  He continued to require PRBC and platelet transfusions. A bone marrow aspirate and biopsy with cultures was done on 8/29/22.  Azithromycin was discontinued and itraconazole was added.  His fevers were not responsive to Tylenol or NSAIDs.  He required IV Decadron to decrease his fever.  He was restarted on prednisone 20 mg daily 8/30/22 and increased to 40 mg daily 8/31/22.    Bone marrow aspirate and biopsy 8/29/22 showed a hypocellular marrow at 20% with predominant involvement by CLL/SLL and absent background trilineage hematopoiesis.  No evidence of prolymphocytic transformation.  Bone marrow aerobic and anaerobic cultures negative.  Mycobacterial and fungal cultures pending.  Fungitell assay negative, Aspergillus antigen negative.  Meropenem and itraconazole discontinued 9/01/22.  Surgical oncology consulted for mesenteric lymph node biopsy.    Today (9/03/22):  Patient underwent laparoscopic biopsy of LN yesterday. He was given IV lasix post-surgery. His respiratory status has declined after surgery  and he is requiring oxygen and also having tachypnea along with coughing. His wife reports that his condition has significantly declined in the last 24 hours. Labs this morning show worsening anemia with Hgb 6.9g/dL, platelets 44K. WBC increased post-surgery to 51K but back down this morning to 31.2K, all lymphocytes. Previously his neutrophils were not being reported but this morning ANC is 3.776. Patient did receive multiple blood products yesterday including blood and platelets. CXR remarkable for large left pleural effusion with ncreased left retrocardiac density and silhouetting of the left hemidiaphragm this is likely related to pleural fluid, however, infiltrate/atelectasis cannot be completely excluded. Discussed with patient, his wife and his son in room. Patient is somnolent this morning. Will wake up to answer questions but is in some mild to moderate respiratory distress. Patient continues spiking fevers, Tmax 103. Restarted antibiotics Merrem and added vancomycin.       SUBJECTIVE:     Continuous Infusions:   sodium chloride 0.9% Stopped (08/29/22 1149)     Scheduled Meds:   acetaminophen  650 mg Oral Once    eltrombopag  100 mg Oral Daily    famotidine  20 mg Oral BID    guaiFENesin  600 mg Oral BID    meropenem (MERREM) IVPB  1 g Intravenous Q8H    multivitamin  1 tablet Oral Daily    predniSONE  40 mg Oral Daily    vancomycin (VANCOCIN) IVPB  1,250 mg Intravenous Q12H     PRN Meds:    Review of patient's allergies indicates:   Allergen Reactions    Sulfa (sulfonamide antibiotics)      Other reaction(s): VOMITING    Sulfamethoxazole-trimethoprim Nausea Only and Nausea And Vomiting     Other reaction(s): Diaphoresis.        Review of Systems:  Constitutional: + fever, + chills, + generalized weakness, + fatigue.    Eye:  No icterus, No blurring, No double vision, No visual disturbances.    Ear/Nose/Mouth/Throat:  No decreased hearing, No nasal congestion, No sore throat.  No gum bleeding, no  epistaxis.  Respiratory:  No shortness of breath, + cough, + white sputum production, streak hemoptysis, No wheezing.    Cardiovascular:  No chest pain, No palpitations, No tachycardia.    Gastrointestinal:  No nausea, No vomiting, No diarrhea, mild constipation, No abdominal pain.    Genitourinary:  No dysuria, Mild hematuria, No change in urine stream.    Hematology/Lymphatics:  + bruising tendency, + bleeding tendency, No peripheral adenopathy.  Musculoskeletal:  No joint pain or back pain. No LE edema. R arm hematoma.  Integumentary:  No rash, No pruritus, + petechiae.    Neurologic:  Alert and oriented X4, No abnormal balance, No headache.    PMHx:   CLL, ITP, HTN  PSHx: Appendectomy, Laparoscopic Cholecystectomy, R arm skin graft, Bone marrow biopsy, Umbilical hernia repair, Colonoscopy 7/17  SH: Lifetime nonsmoker, + Social alcohol use, Lives in [place] with his wife  FH: Father had prostate cancer, mother had renal cell carcinoma       OBJECTIVE:     Vital Signs (Most Recent)  Temp: 97.6 °F (36.4 °C) (09/03/22 0724)  Pulse: 106 (09/03/22 0724)  Resp: (!) 24 (09/03/22 0337)  BP: 132/68 (09/03/22 0724)  SpO2: (!) 94 % (09/03/22 0724)    Physical Exam:  General: well developed, ill-appearing white male in NAD.  HEENT: normocephalic, atraumatic, conjunctivae/corneas clear.  OP clear, no lesions or petechiae.  Lungs:  Bilateral crackles/rhonchi with wheezing, decreased breast sounds in bases. Tachypneic  Heart: tachycardic, regular rhythm, no murmur.  Abdomen: mild distention, scattered laparoscopic incisions, with bandaids in place, no bleeding.  Extremities: +trace LE edema.  Skin: Warm, intact. No rashes or lesions.  Scattered ecchymoses and petechiae on the extremities.  Evolving right forearm hematoma.  Neurologic: Alert and oriented, no focal deficits, normal strength.  Gait normal.      Laboratory:    Recent Labs   Lab 09/03/22  0314   WBC 31.2*   RBC 2.36*   HCT 20.2*   HGB 6.9*   MCV 85.6   MCH 29.2    RDW 14.6   PLT 44*   MPV 12.1*       CMP:  Recent Labs   Lab 09/03/22  0314   CALCIUM 7.6*   ALBUMIN 2.2*   *   K 4.3   CO2 23   BUN 26.5*   CREATININE 0.79   ALKPHOS 97   ALT 42   AST 28   BILITOT 2.4*       Diagnostic Results:  No new imaging for review.      ASSESSMENT/PLAN:   ASSESSMENT  Recurrent CLL   Transfusion-dependent anemia and thrombocytopenia  LLL pneumonia - improved  Persistent fever with negative cultures  Hyponatremia  Left pleural effusion      PLAN  Bone marrow showed involvement by CLL with marked decrease of normal hematopoietic elements. ?aplastic anemia side effect from Acalabrutinib. Explains why he has had no response to Promacta.  Decline in respiratory status, will consult pulmonary for new large left pleural effusion.   Will be risky for thoracentesis given his persistent thrombocytopenia.   Restarted Merrem and Vancomycin for persistent fevers and decline in respiratory status.  All cultures to date remain negative.  Laparoscopic mesenteric lymph node biopsy done 9/2/22. Results pending.  Hold off on further transfusion today given his respiratory status.  Sodium remains low, will check serum osmolality.     Prognosis is extremely guarded.  Discussed with his wife briefly about DNR and she voiced that she does not want him to suffer. Will have further discussions should his status decline further.      Christy Gregory MD

## 2022-09-03 NOTE — CONSULTS
[facility] - Oncology Acute  Pulmonary Critical Care Note    Patient Name: Ronny Cloud  MRN: [id number]  Admission Date: 8/19/2022  Hospital Length of Stay: 14 days  Code Status: Full Code  Attending Provider: José Velez MD  Primary Care Provider: KELLIE STODDARD MD     Subjective:     HPI:   This is a 68 y/o who sustained a fell at home on the evening of 8/18/22 landing on his side.  CT of the head without contrast showed a left parietal lobe heterogeneous hyperdensity - could not rule out partially calcified meningioma or small arachnoid hemorrhage.  Case was discussed with Neurosurgery. MRI brain 08/19/2022 revealed a 13 mm dural-based enhancing mass along the left parietal convexity most suggestive of meningioma with a trace subdural and subarachnoid hemorrhage along the right cerebral convexity.  He was initially observed in the ICU with no progressive neurologic symptoms. He spiked a fever on 8/21/22 and blood cultures were obtained.  He was continued on IV Cefepime.  He received PRBC and platelet transfusions for symptomatic anemia and ongoing thrombocytopenia.  He continued to have fever with negative blood cultures.  Chest x-ray 08/22/22 showed a possible LLL infiltrate.  CT of the chest showed a dense LLL consolidation and additional patchy ground-glass opacities.  Azithromycin was added.  Respiratory PCR panel was negative.  MRSA PCR negative.  Repeat COVID PCR negative.      He continued to spike fevers.  CT C/A/P 8/25/22 showed an unchanged dense LLL consolidation with slight improvement of multifocal ground-glass opacities.  There was prominent paratracheal, retroperitoneal and mesenteric adenopathy and mild splenomegaly.  There was a large left renal cyst with no evidence of hydronephrosis.  He continued to require PRBC and platelet transfusions. A bone marrow aspirate and biopsy with cultures was done on 8/29/22.  Azithromycin was discontinued and itraconazole was added.  His  fevers were not responsive to Tylenol or NSAIDs.  He required IV Decadron to decrease his fever.  He was restarted on prednisone currently on 40 mg daily.     Bone marrow aspirate and biopsy 8/29/22 showed a hypocellular marrow at 20% with predominant involvement by CLL/SLL and absent background trilineage hematopoiesis.  Bone marrow aerobic and anaerobic cultures negative.  Mycobacterial and fungal cultures pending.  Fungitell assay negative, Aspergillus antigen negative.  Meropenem and itraconazole discontinued 9/01/22.  Surgical oncology consulted for mesenteric lymph node biopsy.    Hospital Course/Significant events:  As above.     24 Hour Interval History:  Pulmonary is being consulted this Am for worsening respiratory status; Cxr was obtained which showed a large left pleural effusion.     Past Medical History:   Diagnosis Date    Chronic ITP (idiopathic thrombocytopenia)     Hypertension     Leukemia        Past Surgical History:   Procedure Laterality Date    APPENDECTOMY      BONE MARROW ASPIRATION N/A 8/29/2022    Procedure: ASPIRATION, BONE MARROW;  Surgeon: Claude Rand MD;  Location: [facility] OR;  Service: General;  Laterality: N/A;    BONE MARROW BIOPSY      COLONOSCOPY      7/2017    LAPAROSCOPIC CHOLECYSTECTOMY      SKIN GRAFT N/A     R arm    UMBILICAL HERNIA REPAIR         Social History     Socioeconomic History    Marital status:    Tobacco Use    Smoking status: Never    Smokeless tobacco: Never   Substance and Sexual Activity    Alcohol use: Yes     Comment: social       Current Outpatient Medications   Medication Instructions    CALQUENCE 100 mg Cap 1 capsule, Oral, 2 times daily    eltrombopag (PROMACTA) 100 mg, Oral, Daily    losartan (COZAAR) 100 mg, Oral, Daily    multivitamin with minerals tablet 1 tablet, Oral, Daily    predniSONE (DELTASONE) 40 mg, Oral, Daily       Current Inpatient Medications   acetaminophen  650 mg Oral Once    eltrombopag  100 mg Oral Daily    famotidine  20  mg Oral BID    guaiFENesin  600 mg Oral BID    meropenem (MERREM) IVPB  1 g Intravenous Q8H    multivitamin  1 tablet Oral Daily    predniSONE  40 mg Oral Daily    vancomycin (VANCOCIN) IVPB  1,250 mg Intravenous Q12H       Current Intravenous Infusions   sodium chloride 0.9% Stopped (08/29/22 1149)     Review of Systems   Constitutional:  Positive for malaise/fatigue.   HENT: Negative.     Respiratory:  Positive for cough and shortness of breath.    Gastrointestinal: Negative.    Skin: Negative.    Neurological: Negative.    Endo/Heme/Allergies: Negative.         Objective:       Intake/Output Summary (Last 24 hours) at 9/3/2022 0917  Last data filed at 9/3/2022 0900  Gross per 24 hour   Intake 2376.66 ml   Output 1200 ml   Net 1176.66 ml         Vital Signs (Most Recent):  Temp: 97.6 °F (36.4 °C) (09/03/22 0724)  Pulse: 106 (09/03/22 0724)  Resp: (!) 24 (09/03/22 0337)  BP: 132/68 (09/03/22 0724)  SpO2: (!) 94 % (09/03/22 0724)    Body mass index is 36.91 kg/m².  Weight: 123.6 kg (272 lb 7.8 oz) Vital Signs (24h Range):  Temp:  [96 °F (35.6 °C)-103 °F (39.4 °C)] 97.6 °F (36.4 °C)  Pulse:  [] 106  Resp:  [16-33] 24  SpO2:  [75 %-97 %] 94 %  BP: ()/(43-82) 132/68     Physical Exam  Constitutional:       General: He is in acute distress.      Appearance: He is ill-appearing.   Cardiovascular:      Rate and Rhythm: Normal rate and regular rhythm.   Pulmonary:      Effort: Tachypnea present.      Breath sounds: Decreased air movement present. Examination of the left-upper field reveals decreased breath sounds. Examination of the left-middle field reveals decreased breath sounds. Examination of the left-lower field reveals decreased breath sounds. Decreased breath sounds present.   Abdominal:      General: Bowel sounds are normal.      Palpations: Abdomen is soft.      Comments: Round    Neurological:      General: No focal deficit present.      Mental Status: He is alert.   Psychiatric:         Mood and  Affect: Mood normal.         Behavior: Behavior normal.         Lines/Drains/Airways       Peripheral Intravenous Line  Duration                  Peripheral IV - Single Lumen 08/23/22 0432 20 G Anterior;Left Forearm 11 days         Peripheral IV - Single Lumen 09/02/22 1536 18 G Anterior;Right Forearm <1 day                    Significant Labs:    Lab Results   Component Value Date    WBC 31.2 (H) 09/03/2022    HGB 6.9 (L) 09/03/2022    HCT 20.2 (L) 09/03/2022    MCV 85.6 09/03/2022    PLT 44 (L) 09/03/2022         BMP  Lab Results   Component Value Date     (L) 09/03/2022    K 4.3 09/03/2022    CO2 23 09/03/2022    BUN 26.5 (H) 09/03/2022    CREATININE 0.79 09/03/2022    CALCIUM 7.6 (L) 09/03/2022    EGFRNONAA >60 07/28/2022       ABG  No results for input(s): PH, PO2, PCO2, HCO3, BE in the last 168 hours.      Significant Imaging:X-Ray Chest 1 View  Narrative: EXAMINATION:  XR CHEST 1 VIEW    CPT 91439    CLINICAL HISTORY:  shortness of breath;    COMPARISON:  August 30, 2022    FINDINGS:  Cardiomediastinal silhouette to be unchanged as compared with the previous exam.    There is blunting of the left costophrenic angle indicating the presence of a left-sided pleural effusion with some layering of pleural fluid along side the lateral chest wall.    There is increased left retrocardiac density and silhouetting of the left hemidiaphragm this is likely related to pleural fluid, however, infiltrate/atelectasis cannot be completely excluded.    Right lung field is clear  Impression: Interval development of left-sided pleural effusion.    Increased left retrocardiac density and silhouetting of the left hemidiaphragm as above    Electronically signed by: William Gautam  Date:    09/03/2022  Time:    09:09     Assessment/Plan:     Assessment  Recurrent CLL   Anemia and thrombocytopenia - requiring transfusion   Persistent fever   Hyponatremia   Large pleural effusion       Plan  Discussed case with MD John martinez to  tap at this point.   H&H is low again today defer MGMT to Hematology/oncology   Trial of Vapotherm  Will attempt diuresis           Jenny Draper, ANP  Pulmonary Critical Care Medicine  [facility] - Oncology [facility]

## 2022-09-03 NOTE — ANESTHESIA POSTPROCEDURE EVALUATION
Anesthesia Post Evaluation    Patient: Ronny Cloud    Procedure(s) Performed: Procedure(s) (LRB):  LAPAROSCOPY, DIAGNOSTIC (N/A)    Final Anesthesia Type: general      Patient location during evaluation: PACU  Patient participation: Yes- Able to Participate  Level of consciousness: awake  Post-procedure vital signs: reviewed and stable  Pain management: adequate  Airway patency: patent    PONV status at discharge: vomiting (controlled) and nausea (controlled)  Anesthetic complications: no      Cardiovascular status: hemodynamically stable  Respiratory status: spontaneous ventilation and unassisted  Hydration status: euvolemic  Follow-up not needed.  Comments:              Vitals Value Taken Time   /78 09/03/22 1200   Temp 37.8 °C (100 °F) 09/03/22 1115   Pulse 110 09/03/22 1115   Resp 20 09/03/22 1518   SpO2 95 % 09/03/22 1115         Event Time   Out of Recovery 09/02/2022 18:27:00         Pain/[place] Score: Pain Rating Prior to Med Admin: 7 (9/3/2022  3:18 PM)  Pain Rating Post Med Admin: 0 (9/3/2022  3:48 PM)  [place] Score: 9 (9/2/2022  6:27 PM)    José Velez MD   Physician  Hematology and Oncology  Progress Notes  Signed  Creation Time:  9/3/2022  3:00 PM                            Progress note  Hematology-Oncology     Patient with acute decline today with progressive tachypnea and hypoxia.  Chest x-ray shows development of dense consolidation in the left lower lobe, suspicious for pleural effusion.  He was evaluated by Pulmonary who felt the risks outweighed the benefits for bedside thoracentesis.  His wife and family are requesting comfort care measures only.  He is tachypneic and using accessory muscles.  Oxygen saturation 95% on 3 L nasal cannula.  He is somnolent but arousable.  He denies any pain.  He continues to spike fevers.  His wife is in agreement with DNR code status.  IV morphine as needed for comfort.  Case discussed with Dr. Gregory.     JOSÉ VELEZ MD      Electronically signed  by José Velez MD at 9/3/2022  3:04 PM    Pt had adequate VS and Resp in immediate post Op period on 9/2

## 2022-09-03 NOTE — PROGRESS NOTES
Progress note  Hematology-Oncology    Patient with acute decline today with progressive tachypnea and hypoxia.  Chest x-ray shows development of dense consolidation in the left lower lobe, suspicious for pleural effusion.  He was evaluated by Pulmonary who felt the risks outweighed the benefits for bedside thoracentesis.  His wife and family are requesting comfort care measures only.  He is tachypneic and using accessory muscles.  Oxygen saturation 95% on 3 L nasal cannula.  He is somnolent but arousable.  He denies any pain.  He continues to spike fevers.  His wife is in agreement with DNR code status.  IV morphine as needed for comfort.  Case discussed with Dr. Gregory.    DUKE LYNCH MD

## 2022-09-03 NOTE — PLAN OF CARE
Problem: Adult Inpatient Plan of Care  Goal: Plan of Care Review  Outcome: Ongoing, Progressing  Goal: Patient-Specific Goal (Individualized)  Outcome: Ongoing, Progressing  Goal: Absence of Hospital-Acquired Illness or Injury  Outcome: Ongoing, Progressing  Goal: Optimal Comfort and Wellbeing  Outcome: Ongoing, Progressing  Goal: Readiness for Transition of Care  Outcome: Ongoing, Progressing     Problem: Adjustment to Illness (Stroke, Hemorrhagic)  Goal: Optimal Coping  Outcome: Ongoing, Progressing  Intervention: Support Psychosocial Response to Stroke  Flowsheets (Taken 9/3/2022 1230)  Supportive Measures:   active listening utilized   goal-setting facilitated  Family/Support System Care:   caregiver stress acknowledged   self-care encouraged   presence promoted     Problem: Bowel Elimination Impaired (Stroke, Hemorrhagic)  Goal: Effective Bowel Elimination  Outcome: Ongoing, Progressing     Problem: Cerebral Tissue Perfusion (Stroke, Hemorrhagic)  Goal: Optimal Cerebral Tissue Perfusion  Outcome: Ongoing, Progressing     Problem: Cognitive Impairment (Stroke, Hemorrhagic)  Goal: Optimal Cognitive Function  Outcome: Ongoing, Progressing  Intervention: Optimize Cognitive Function  Flowsheets (Taken 9/3/2022 1230)  Sensory Stimulation Regulation:   auditory stimulation minimized   care clustered     Problem: Communication Impairment (Stroke, Hemorrhagic)  Goal: Effective Communication Skills  Outcome: Ongoing, Progressing     Problem: Functional Ability Impaired (Stroke, Hemorrhagic)  Goal: Optimal Functional Ability  Outcome: Ongoing, Progressing     Problem: Pain (Stroke, Hemorrhagic)  Goal: Acceptable Pain Control  Outcome: Ongoing, Progressing     Problem: Respiratory Compromise (Stroke, Hemorrhagic)  Goal: Effective Oxygenation and Ventilation  Outcome: Ongoing, Progressing  Intervention: Optimize Oxygenation and Ventilation  Flowsheets (Taken 9/3/2022 1230)  Airway/Ventilation Management: airway patency  maintained  Head of Bed (HOB) Positioning: HOB at 30 degrees     Problem: Sensorimotor Impairment (Stroke, Hemorrhagic)  Goal: Improved Sensorimotor Function  Outcome: Ongoing, Progressing     Problem: Swallowing Impairment (Stroke, Hemorrhagic)  Goal: Optimal Eating and Swallowing Without Aspiration  Outcome: Ongoing, Progressing     Problem: Urinary Elimination Impaired (Stroke, Hemorrhagic)  Goal: Effective Urinary Elimination  Outcome: Ongoing, Progressing     Problem: Fall Injury Risk  Goal: Absence of Fall and Fall-Related Injury  Outcome: Ongoing, Progressing     Problem: Fluid Imbalance (Pneumonia)  Goal: Fluid Balance  Outcome: Ongoing, Progressing  Intervention: Monitor and Manage Fluid Balance  Flowsheets (Taken 9/3/2022 1230)  Fluid/Electrolyte Management: fluids provided     Problem: Infection (Pneumonia)  Goal: Resolution of Infection Signs and Symptoms  Outcome: Ongoing, Progressing  Intervention: Prevent Infection Progression  Flowsheets (Taken 9/3/2022 1230)  Isolation Precautions: precautions maintained     Problem: Respiratory Compromise (Pneumonia)  Goal: Effective Oxygenation and Ventilation  Outcome: Ongoing, Progressing     Problem: Fever  Goal: Body Temperature in Desired Range  Outcome: Ongoing, Progressing

## 2022-09-04 NOTE — PROGRESS NOTES
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    Treatment History  Fludarabine/Rituxan x 5 cycles (1/15)  WinRho 5/18  Rituxan x4 (6/18)  Promacta 8/18-present    He was initially treated with 5 cycles of FR CLL with a CR.  Cycle 6 was held due to cumulative cytopenias despite dose reduction.  His counts recovered gradually.  He developed acute ITP 5/18 without evidence of recurrence of his CLL at that time.  He was refractory to treatment with prednisone, WinRho and Rituxan.  He responded to treatment with Promacta and was continued on treatment.  He developed recurrence of his CLL and was started on Acalabrutinib 4/25/22. Platelet count at that time was 140,000 and Promacta was held.  Treatment was well tolerated.      07/28/22: Plts 30,000. Resumed Promacta 50 mg/d.  08/08/22: Plts 1,000. Prednisone 40 mg/d.  08/10/22: Plts 1,000. IVIG 1 gm/kg x 2 days.  08/15/22: Plts 4,000. D/C Acalabrutinib and increased Promacta to 100 mg/d.  08/18/22: Plts 2,000. Transfuse 1 unit platelets on 8/19/22.    Patient fell at home on the evening of 8/18/22 landing on his side.  He did not lose consciousness, he denies hitting his head.  He did have a headache.  Has a hematoma involving the right forearm.  CBC showed progressive leukocytosis and anemia.  Hospital admission was recommended for additional workup and treatment.  He was alert and oriented.  He had no focal neurologic deficits.  He denied any fever or chills.  CT of the head without contrast showed a left parietal lobe heterogeneous hyperdensity - could not rule out partially calcified meningioma or small arachnoid hemorrhage.  Case was discussed with Neurosurgery. MRI brain 08/19/2022 revealed a 13 mm dural-based enhancing mass along the left parietal convexity most suggestive of meningioma with a trace subdural and subarachnoid hemorrhage along the right cerebral  convexity.      He was observed in the ICU with no progressive neurologic symptoms. He spiked a fever on 8/21/22 and blood cultures were obtained.  He was continued on IV Cefepime.  He received PRBC and platelet transfusions for symptomatic anemia and ongoing thrombocytopenia.  He continued to have fever with negative blood cultures.  Chest x-ray 08/22/22 showed a possible LLL infiltrate.  CT of the chest showed a dense LLL consolidation and additional patchy ground-glass opacities.  Azithromycin was added.  Respiratory PCR panel was negative.  MRSA PCR negative.  Repeat COVID PCR negative.     He continued to spike fevers.  CT C/A/P 8/25/22 showed an unchanged dense LLL consolidation with slight improvement of multifocal ground-glass opacities.  There was prominent paratracheal, retroperitoneal and mesenteric adenopathy and mild splenomegaly.  There was a large left renal cyst with no evidence of hydronephrosis.  He continued to require PRBC and platelet transfusions. A bone marrow aspirate and biopsy with cultures was done on 8/29/22.  Azithromycin was discontinued and itraconazole was added.  His fevers were not responsive to Tylenol or NSAIDs.  He required IV Decadron to decrease his fever.  He was restarted on prednisone 20 mg daily 8/30/22 and increased to 40 mg daily 8/31/22.    Bone marrow aspirate and biopsy 8/29/22 showed a hypocellular marrow at 20% with predominant involvement by CLL/SLL and absent background trilineage hematopoiesis.  No evidence of prolymphocytic transformation.  Bone marrow aerobic and anaerobic cultures negative.  Mycobacterial and fungal cultures pending.  Fungitell assay negative, Aspergillus antigen negative.  Meropenem and itraconazole discontinued 9/01/22.  Surgical oncology consulted for mesenteric lymph node biopsy.  flor underwent laparoscopic biopsy of LN on 9/2/22. Pathology pending. He was given IV lasix post-surgery. His respiratory status has declined after surgery.  CXR showed new dense consolidation/pleural effusion on left. Patient seen by pulmonary and deemed high risk for thoracentesis.     Today (9/04/22):  Patient's family all at bedside. Dr. Velez came to see patient yesterday afternoon and discussed his grave condition. His wife made decision for DNR and comfort care. Patient is still running high fevers. He is receiving IV morphine every 2 hours which is keeping him more comfortable. His family reports that he does wake up and say a few words, such as asking for ice. But otherwise he remains somnolent from medication.    SUBJECTIVE:     Continuous Infusions:   sodium chloride 0.9% Stopped (08/29/22 1149)     Scheduled Meds:   acetaminophen  1,000 mg Intravenous Q8H    meropenem (MERREM) IVPB  1 g Intravenous Q8H    scopolamine  1 patch Transdermal Q3 Days    vancomycin (VANCOCIN) IVPB  1,250 mg Intravenous Q12H     PRN Meds:    Review of patient's allergies indicates:   Allergen Reactions    Sulfa (sulfonamide antibiotics)      Other reaction(s): VOMITING    Sulfamethoxazole-trimethoprim Nausea Only and Nausea And Vomiting     Other reaction(s): Diaphoresis.        Review of Systems: Negative except for above.    PMHx:   CLL, ITP, HTN  PSHx: Appendectomy, Laparoscopic Cholecystectomy, R arm skin graft, Bone marrow biopsy, Umbilical hernia repair, Colonoscopy 7/17  SH: Lifetime nonsmoker, + Social alcohol use, Lives in [place] with his wife  FH: Father had prostate cancer, mother had renal cell carcinoma       OBJECTIVE:     Vital Signs (Most Recent)  Temp: 98.6 °F (37 °C) (09/04/22 0740)  Pulse: (!) 137 (Reported to Nurse) (09/04/22 0740)  Resp: (!) 22 (09/04/22 0811)  BP: 125/76 (09/04/22 0740)  SpO2: (!) 94 % (09/04/22 0740)    Physical Exam:  General: ill-appearing male, somnolent.  HEENT: normocephalic, atraumatic, conjunctivae/corneas clear.  OP clear, no lesions or petechiae.  Lungs:  Bilateral crackles/rhonchi with wheezing, decreased breast sounds in bases.  Tachypneic  Heart: tachycardic, regular rhythm, no murmur.  Abdomen: mild distention, scattered laparoscopic incisions, with bandaids in place, no bleeding.  Extremities: +trace LE edema.  Skin: Warm, intact. No rashes or lesions.  Scattered ecchymoses and petechiae on the extremities.  Evolving right forearm hematoma.  Neurologic: somnolent from medications    Laboratory:    Recent Labs   Lab 09/04/22  0316   WBC 23.1*   RBC 2.14*   HCT 18.1*   HGB 6.5*   MCV 84.6   MCH 30.4   RDW 14.6   PLT 29*   MPV 12.6*       CMP:  Recent Labs   Lab 09/04/22  0316   CALCIUM 7.7*   ALBUMIN 1.9*   *   K 4.1   CO2 22*   BUN 28.3*   CREATININE 0.76   ALKPHOS 101   ALT 43   AST 33   BILITOT 1.8*       Diagnostic Results:  No new imaging for review.      ASSESSMENT/PLAN:   ASSESSMENT  Recurrent CLL   Bone marrow failure  Transfusion-dependent anemia and thrombocytopenia  LLL pneumonia   Persistent fever with negative cultures  Hyponatremia  Left pleural effusion  Declining Respiratory status      PLAN  Continue comfort care.  Continue morphine for dyspnea.  IV Tylenol PRN for high fever.  Prognosis is grim.  Patient is a DNR.    God bless this sweet man and his family.      Christy Gregory MD

## 2022-09-04 NOTE — PLAN OF CARE
Problem: Adult Inpatient Plan of Care  Goal: Plan of Care Review  Outcome: Ongoing, Progressing  Flowsheets (Taken 9/4/2022 0148)  Plan of Care Reviewed With:   family   spouse  Goal: Patient-Specific Goal (Individualized)  Outcome: Ongoing, Progressing  Goal: Absence of Hospital-Acquired Illness or Injury  Outcome: Ongoing, Progressing  Goal: Optimal Comfort and Wellbeing  Intervention: Monitor Pain and Promote Comfort  Flowsheets (Taken 9/4/2022 0148)  Pain Management Interventions:   quiet environment facilitated   medication offered   care clustered  Goal: Readiness for Transition of Care  Outcome: Ongoing, Progressing     Problem: Adjustment to Illness (Stroke, Hemorrhagic)  Goal: Optimal Coping  Outcome: Ongoing, Progressing  Intervention: Support Psychosocial Response to Stroke  Flowsheets (Taken 9/4/2022 0148)  Supportive Measures: active listening utilized     Problem: Bowel Elimination Impaired (Stroke, Hemorrhagic)  Goal: Effective Bowel Elimination  Outcome: Ongoing, Progressing     Problem: Cerebral Tissue Perfusion (Stroke, Hemorrhagic)  Goal: Optimal Cerebral Tissue Perfusion  Outcome: Ongoing, Progressing     Problem: Cognitive Impairment (Stroke, Hemorrhagic)  Goal: Optimal Cognitive Function  Outcome: Ongoing, Progressing     Problem: Communication Impairment (Stroke, Hemorrhagic)  Goal: Effective Communication Skills  Outcome: Ongoing, Progressing     Problem: Functional Ability Impaired (Stroke, Hemorrhagic)  Goal: Optimal Functional Ability  Outcome: Ongoing, Progressing     Problem: Pain (Stroke, Hemorrhagic)  Goal: Acceptable Pain Control  Outcome: Ongoing, Progressing  Intervention: Monitor and Manage Pain  Flowsheets (Taken 9/4/2022 0148)  Pain Management Interventions:   quiet environment facilitated   medication offered   care clustered     Problem: Sensorimotor Impairment (Stroke, Hemorrhagic)  Goal: Improved Sensorimotor Function  Outcome: Ongoing, Progressing     Problem: Swallowing  Impairment (Stroke, Hemorrhagic)  Goal: Optimal Eating and Swallowing Without Aspiration  Outcome: Ongoing, Progressing     Problem: Urinary Elimination Impaired (Stroke, Hemorrhagic)  Goal: Effective Urinary Elimination  Outcome: Ongoing, Progressing     Problem: Fall Injury Risk  Goal: Absence of Fall and Fall-Related Injury  Outcome: Ongoing, Progressing     Problem: Respiratory Compromise (Pneumonia)  Goal: Effective Oxygenation and Ventilation  Outcome: Ongoing, Progressing

## 2022-09-04 NOTE — OP NOTE
Date of Surgery:  9/2/22    Surgeon:  Ammon Mcclain MD    Assistant:  None                                        Pre-op Diagnosis:  Lympadenopathy    Post-op Diagnosis:  Same    Procedure:  Laparoscopic excisional biopsy portal lymph node    Anesthesia:  GETA    EBL:  50cc    Specimens:  Portal lymph node biopsy    Findings:  Representative sections of enlarged portal lymph node taken and sent for lymph node protocol    Procedure in detail:  After informed consent was obtained the patient was taken to the operating room and placed in the supine position. General endotracheal anesthesia was achieved. The abdomen was prepped and draped in sterile fashion. A right upper quadrant incision was made and and optical trochar was used to enter the peritoneal cavity under direct vision. Pneumopertineum was achieved without difficulty. Additional ports were placed without difficulty. An enlarged portal lymph node was exposed and representative sections were taken with biopsy forceps. Specimen was sent fresh for lymph node protocol. Meticulous hemostasis was achieved. Ports removed, pneumoperitoneum released and wounds closed with absorbable suture. Sterile dressings were applied.       Ammon Mcclain MD  Surgical Oncology  Complex General, Gastrointestinal and Hepatobiliary Surgery

## 2022-09-04 NOTE — PLAN OF CARE
Problem: Adult Inpatient Plan of Care  Goal: Plan of Care Review  Outcome: Ongoing, Progressing  Goal: Patient-Specific Goal (Individualized)  Outcome: Ongoing, Progressing  Goal: Absence of Hospital-Acquired Illness or Injury  Outcome: Ongoing, Progressing  Goal: Optimal Comfort and Wellbeing  Outcome: Ongoing, Progressing  Goal: Readiness for Transition of Care  Outcome: Ongoing, Progressing     Problem: Adjustment to Illness (Stroke, Hemorrhagic)  Goal: Optimal Coping  Outcome: Ongoing, Progressing     Problem: Bowel Elimination Impaired (Stroke, Hemorrhagic)  Goal: Effective Bowel Elimination  Outcome: Ongoing, Progressing     Problem: Cerebral Tissue Perfusion (Stroke, Hemorrhagic)  Goal: Optimal Cerebral Tissue Perfusion  Outcome: Ongoing, Progressing     Problem: Cognitive Impairment (Stroke, Hemorrhagic)  Goal: Optimal Cognitive Function  Outcome: Ongoing, Progressing     Problem: Communication Impairment (Stroke, Hemorrhagic)  Goal: Effective Communication Skills  Outcome: Ongoing, Progressing     Problem: Functional Ability Impaired (Stroke, Hemorrhagic)  Goal: Optimal Functional Ability  Outcome: Ongoing, Progressing     Problem: Pain (Stroke, Hemorrhagic)  Goal: Acceptable Pain Control  Outcome: Ongoing, Progressing     Problem: Respiratory Compromise (Stroke, Hemorrhagic)  Goal: Effective Oxygenation and Ventilation  Outcome: Ongoing, Progressing     Problem: Sensorimotor Impairment (Stroke, Hemorrhagic)  Goal: Improved Sensorimotor Function  Outcome: Ongoing, Progressing     Problem: Swallowing Impairment (Stroke, Hemorrhagic)  Goal: Optimal Eating and Swallowing Without Aspiration  Outcome: Ongoing, Progressing     Problem: Urinary Elimination Impaired (Stroke, Hemorrhagic)  Goal: Effective Urinary Elimination  Outcome: Ongoing, Progressing     Problem: Fall Injury Risk  Goal: Absence of Fall and Fall-Related Injury  Outcome: Ongoing, Progressing     Problem: Fluid Imbalance (Pneumonia)  Goal: Fluid  Balance  Outcome: Ongoing, Progressing     Problem: Infection (Pneumonia)  Goal: Resolution of Infection Signs and Symptoms  Outcome: Ongoing, Progressing     Problem: Respiratory Compromise (Pneumonia)  Goal: Effective Oxygenation and Ventilation  Outcome: Ongoing, Progressing     Problem: Fever  Goal: Body Temperature in Desired Range  Outcome: Ongoing, Progressing     Problem: Skin Injury Risk Increased  Goal: Skin Health and Integrity  Outcome: Ongoing, Progressing

## 2022-09-05 NOTE — PLAN OF CARE
Problem: Adult Inpatient Plan of Care  Goal: Plan of Care Review  Outcome: Ongoing, Progressing  Flowsheets (Taken 9/5/2022 0259)  Plan of Care Reviewed With: patient  Goal: Patient-Specific Goal (Individualized)  Outcome: Ongoing, Progressing  Goal: Absence of Hospital-Acquired Illness or Injury  Outcome: Ongoing, Progressing  Intervention: Identify and Manage Fall Risk  Flowsheets (Taken 9/5/2022 0259)  Safety Promotion/Fall Prevention:   assistive device/personal item within reach   side rails raised x 2   Fall Risk reviewed with patient/family   Fall Risk signage in place   nonskid shoes/socks when out of bed   bed alarm set  Intervention: Prevent Infection  Flowsheets (Taken 9/5/2022 0259)  Infection Prevention:   hand hygiene promoted   single patient room provided   rest/sleep promoted  Goal: Optimal Comfort and Wellbeing  Outcome: Ongoing, Progressing  Intervention: Monitor Pain and Promote Comfort  Flowsheets (Taken 9/5/2022 0259)  Pain Management Interventions:   care clustered   medication offered   quiet environment facilitated     Problem: Adjustment to Illness (Stroke, Hemorrhagic)  Goal: Optimal Coping  Outcome: Ongoing, Progressing     Problem: Swallowing Impairment (Stroke, Hemorrhagic)  Goal: Optimal Eating and Swallowing Without Aspiration  Outcome: Ongoing, Not Progressing

## 2022-09-05 NOTE — PROGRESS NOTES
Death note:    Patient with a diagnosis of CLL and refractory ITP who was transition to inpatient hospice.  I was informed by nursing that the patient passed away at 8:23 a.m..  Examined patient in room with family at bedside.   Noted pupils fixed and dilated, peripheral pulse not palpable, unable to auscultate heart sounds. Patient not breathing spontaneously.     Date and time of death: 09/05/22 at 8:23 am.       Brittny Rolon

## 2022-09-06 LAB
ABO + RH BLD: NORMAL
BLD PROD TYP BPU: NORMAL
BLOOD UNIT EXPIRATION DATE: NORMAL
BLOOD UNIT TYPE CODE: 7300
CROSSMATCH INTERPRETATION: NORMAL
DISPENSE STATUS: NORMAL
UNIT NUMBER: NORMAL

## 2022-09-06 NOTE — DISCHARGE SUMMARY
DISCHARGE SUMMARY  Hematology/Oncology       History of Present Illness:  Patient is a 67 y.o. 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    Treatment History  Fludarabine/Rituxan x 5 cycles (1/15)  WinRho 5/18  Rituxan x4 (6/18)  Promacta 8/18-present    He was initially treated with 5 cycles of FR CLL with a CR.  Cycle 6 was held due to cumulative cytopenias despite dose reduction.  His counts recovered gradually.  He developed acute ITP 5/18 without evidence of recurrence of his CLL at that time.  He was refractory to treatment with prednisone, WinRho and Rituxan.  He responded to treatment with Promacta and was continued on treatment.  He developed recurrence of his CLL and was started on Acalabrutinib 4/25/22. Platelet count at that time was 140,000 and Promacta was held.  Treatment was well tolerated.      07/28/22: Plts 30,000. Resumed Promacta 50 mg/d.  08/08/22: Plts 1,000. Prednisone 40 mg/d.  08/10/22: Plts 1,000. IVIG 1 gm/kg x 2 days.  08/15/22: Plts 4,000. D/C Acalabrutinib and increased Promacta to 100 mg/d.  08/18/22: Plts 2,000. Transfuse 1 unit platelets on 8/19/22.      HOSPITAL SUMMARY  Patient fell at home on the evening of 8/18/22 landing on his side.  He did not lose consciousness, he denies hitting his head.  He did have a headache.  Has a hematoma involving the right forearm.  CBC showed progressive leukocytosis and anemia.  Hospital admission was recommended for additional workup and treatment.  He was alert and oriented.  He had no focal neurologic deficits.  He denied any fever or chills.  CT of the head without contrast showed a left parietal lobe heterogeneous hyperdensity - could not rule out partially calcified meningioma or small arachnoid hemorrhage.  Case was discussed with Neurosurgery. MRI brain 08/19/2022 revealed a 13 mm dural-based enhancing mass along the left parietal convexity most suggestive of meningioma with a trace subdural and subarachnoid hemorrhage along the  right cerebral convexity.      He was observed in the ICU with no progressive neurologic symptoms. He spiked a fever on 8/21/22 and blood cultures were obtained.  He was continued on IV Cefepime.  He received PRBC and platelet transfusions for symptomatic anemia and ongoing thrombocytopenia.  He continued to have fever with negative blood cultures.  Chest x-ray 08/22/22 showed a possible LLL infiltrate.  CT of the chest showed a dense LLL consolidation and additional patchy ground-glass opacities.  Azithromycin was added.  Respiratory PCR panel was negative.  MRSA PCR negative.  Repeat COVID PCR negative.     He continued to spike fevers.  CT C/A/P 8/25/22 showed an unchanged dense LLL consolidation with slight improvement of multifocal ground-glass opacities.  There was prominent paratracheal, retroperitoneal and mesenteric adenopathy and mild splenomegaly.  There was a large left renal cyst with no evidence of hydronephrosis.  He continued to require PRBC and platelet transfusions. A bone marrow aspirate and biopsy with cultures was done on 8/29/22.  Azithromycin was discontinued and itraconazole was added.  His fevers were not responsive to Tylenol or NSAIDs.  He required IV Decadron to decrease his fever.  He was restarted on prednisone 20 mg daily 8/30/22 and increased to 40 mg daily 8/31/22.    Bone marrow aspirate and biopsy 8/29/22 showed a hypocellular marrow at 20% with predominant involvement by CLL/SLL and absent background trilineage hematopoiesis.  No evidence of prolymphocytic transformation.  Bone marrow aerobic and anaerobic cultures negative.  Mycobacterial and fungal cultures pending.  Fungitell assay negative, Aspergillus antigen negative.  Meropenem and itraconazole discontinued 9/01/22.  Surgical oncology consulted for mesenteric lymph node biopsy which was performed on 09/02/22.    He had an acute decline in his condition the day after surgery with progressive respiratory distress and hypoxia.   Chest x-ray showed development of a large left pleural effusion.  He was evaluated by Pulmonary and deemed high risk for thoracentesis.  Family requested DNR code status and palliative/supportive care.  He received IV morphine for dyspnea and pain.  He continued to have high fevers.  IV antibiotics were restarted.  His clinical condition  steadily declined and he  on 22 at 8:23 a.m. with his family at the bedside.        DUKE LYNCH MD

## 2022-09-08 LAB
DHEA SERPL-MCNC: NORMAL
DHEA SERPL-MCNC: NORMAL
ESTRIOL SERPL-MCNC: NORMAL NG/ML
ESTROGEN SERPL-MCNC: NORMAL PG/ML
ESTROGEN SERPL-MCNC: NORMAL PG/ML
INSULIN SERPL-ACNC: NORMAL U[IU]/ML
INSULIN SERPL-ACNC: NORMAL U[IU]/ML
LAB AP BM CBC: NORMAL
LAB AP BM FLOWCYTOMETRY RESULT: NORMAL
LAB AP BM PERIPHERAL SMEAR: NORMAL
LAB AP CLINICAL INFORMATION: NORMAL
LAB AP CLINICAL INFORMATION: NORMAL
LAB AP GROSS DESCRIPTION: NORMAL
LAB AP GROSS DESCRIPTION: NORMAL
LAB AP REPORT FOOTNOTES: NORMAL
T3RU NFR SERPL: NORMAL %
T3RU NFR SERPL: NORMAL %

## 2022-09-12 ENCOUNTER — TELEPHONE (OUTPATIENT)
Dept: HEMATOLOGY/ONCOLOGY | Facility: CLINIC | Age: 67
End: 2022-09-12
Payer: MEDICARE

## 2022-09-12 NOTE — TELEPHONE ENCOUNTER
Patient's wife, Katina, would like either Dr. Velez or Conchita to contact her with results of LN bx. She can be reached at [phone].

## 2022-09-15 LAB — PATH REV: NORMAL

## 2022-10-03 LAB — FUNGUS SPEC CULT: NORMAL

## 2022-10-12 LAB — MYCOBACTERIUM SPEC QL CULT: NORMAL

## 2022-10-17 LAB — LYMPHOCYTES NFR SPEC AUTO: 93 %

## (undated) DEVICE — SOL IRRI STRL WATER 1000ML

## (undated) DEVICE — CANNULA LAP SEAL Z THRD 5X100

## (undated) DEVICE — CHLORAPREP W TINT 26ML APPL

## (undated) DEVICE — WARMER DRAPE STERILE LF

## (undated) DEVICE — IRRIGATOR SUCTION W/TIP

## (undated) DEVICE — SYR SLIP TIP 20CC

## (undated) DEVICE — APPLICATOR STRL COT 2INNR 6IN

## (undated) DEVICE — SUT VICRYL+ 27 UR-6 VIOL

## (undated) DEVICE — TROCAR ENDO Z THREAD KII 5X100

## (undated) DEVICE — Device

## (undated) DEVICE — PAD OVERLAY MATTRESS 32X73X3IN

## (undated) DEVICE — CORD LAP 10 DISP

## (undated) DEVICE — PATCH SEALANT EVARREST 2X4

## (undated) DEVICE — BANDAID HOT COLORS

## (undated) DEVICE — GLOVE PROTEXIS BLUE LATEX 7

## (undated) DEVICE — ADHESIVE DERMABOND ADVANCED

## (undated) DEVICE — TRAY CATH FOL SIL URIMTR 16FR

## (undated) DEVICE — ELECTRODE PATIENT RETURN DISP

## (undated) DEVICE — SUT MCRYL PLUS 4-0 PS2 27IN

## (undated) DEVICE — SCISSOR CURVED ENDOPATH 5MM

## (undated) DEVICE — CONTAINER SPECIMEN SCREW 4OZ

## (undated) DEVICE — BANDAGE CURITY SHEER ADH 1X3IN

## (undated) DEVICE — GLOVE PROTEXIS HYDROGEL SZ6.5

## (undated) DEVICE — GLOVE PROTEXIS HYDROGEL SZ7

## (undated) DEVICE — COVER PROBE US 5.5X58L NON LTX

## (undated) DEVICE — DRESSING TELFA N ADH 3X8IN